# Patient Record
Sex: MALE | Race: BLACK OR AFRICAN AMERICAN | NOT HISPANIC OR LATINO | Employment: OTHER | URBAN - METROPOLITAN AREA
[De-identification: names, ages, dates, MRNs, and addresses within clinical notes are randomized per-mention and may not be internally consistent; named-entity substitution may affect disease eponyms.]

---

## 2017-04-17 PROBLEM — C90.01 MULTIPLE MYELOMA IN REMISSION: Status: ACTIVE | Noted: 2017-04-17

## 2017-07-06 ENCOUNTER — OFFICE VISIT (OUTPATIENT)
Dept: HEMATOLOGY/ONCOLOGY | Facility: CLINIC | Age: 67
End: 2017-07-06
Payer: MEDICARE

## 2017-07-06 ENCOUNTER — LAB VISIT (OUTPATIENT)
Dept: LAB | Facility: HOSPITAL | Age: 67
End: 2017-07-06
Attending: INTERNAL MEDICINE
Payer: MEDICARE

## 2017-07-06 VITALS
BODY MASS INDEX: 33.66 KG/M2 | TEMPERATURE: 99 F | DIASTOLIC BLOOD PRESSURE: 86 MMHG | HEIGHT: 73 IN | WEIGHT: 254 LBS | HEART RATE: 85 BPM | SYSTOLIC BLOOD PRESSURE: 152 MMHG

## 2017-07-06 DIAGNOSIS — R13.10 DYSPHAGIA, UNSPECIFIED TYPE: ICD-10-CM

## 2017-07-06 DIAGNOSIS — Z94.84 HISTORY OF PERIPHERAL STEM CELL TRANSPLANT: ICD-10-CM

## 2017-07-06 DIAGNOSIS — C90.01 MULTIPLE MYELOMA IN REMISSION: Primary | ICD-10-CM

## 2017-07-06 DIAGNOSIS — C90.01 MULTIPLE MYELOMA IN REMISSION: ICD-10-CM

## 2017-07-06 LAB
ALBUMIN SERPL BCP-MCNC: 3.5 G/DL
ALP SERPL-CCNC: 99 U/L
ALT SERPL W/O P-5'-P-CCNC: 30 U/L
ANION GAP SERPL CALC-SCNC: 9 MMOL/L
AST SERPL-CCNC: 21 U/L
BASOPHILS # BLD AUTO: 0.02 K/UL
BASOPHILS NFR BLD: 0.4 %
BILIRUB SERPL-MCNC: 0.3 MG/DL
BUN SERPL-MCNC: 12 MG/DL
CALCIUM SERPL-MCNC: 9 MG/DL
CHLORIDE SERPL-SCNC: 101 MMOL/L
CO2 SERPL-SCNC: 29 MMOL/L
CREAT SERPL-MCNC: 1.2 MG/DL
DIFFERENTIAL METHOD: ABNORMAL
EOSINOPHIL # BLD AUTO: 0.2 K/UL
EOSINOPHIL NFR BLD: 3.8 %
ERYTHROCYTE [DISTWIDTH] IN BLOOD BY AUTOMATED COUNT: 15.1 %
EST. GFR  (AFRICAN AMERICAN): >60 ML/MIN/1.73 M^2
EST. GFR  (NON AFRICAN AMERICAN): >60 ML/MIN/1.73 M^2
GLUCOSE SERPL-MCNC: 85 MG/DL
HCT VFR BLD AUTO: 37.5 %
HGB BLD-MCNC: 12.7 G/DL
LYMPHOCYTES # BLD AUTO: 2.4 K/UL
LYMPHOCYTES NFR BLD: 52.2 %
MCH RBC QN AUTO: 33.4 PG
MCHC RBC AUTO-ENTMCNC: 33.9 %
MCV RBC AUTO: 99 FL
MONOCYTES # BLD AUTO: 0.7 K/UL
MONOCYTES NFR BLD: 16.4 %
NEUTROPHILS # BLD AUTO: 1.2 K/UL
NEUTROPHILS NFR BLD: 27 %
PLATELET # BLD AUTO: 68 K/UL
PMV BLD AUTO: 10.9 FL
POTASSIUM SERPL-SCNC: 3.6 MMOL/L
PROT SERPL-MCNC: 7.4 G/DL
RBC # BLD AUTO: 3.8 M/UL
SODIUM SERPL-SCNC: 139 MMOL/L
WBC # BLD AUTO: 4.52 K/UL

## 2017-07-06 PROCEDURE — 1159F MED LIST DOCD IN RCRD: CPT | Mod: ,,, | Performed by: INTERNAL MEDICINE

## 2017-07-06 PROCEDURE — 84165 PROTEIN E-PHORESIS SERUM: CPT

## 2017-07-06 PROCEDURE — 84165 PROTEIN E-PHORESIS SERUM: CPT | Mod: 26,,, | Performed by: PATHOLOGY

## 2017-07-06 PROCEDURE — 1125F AMNT PAIN NOTED PAIN PRSNT: CPT | Mod: ,,, | Performed by: INTERNAL MEDICINE

## 2017-07-06 PROCEDURE — 85025 COMPLETE CBC W/AUTO DIFF WBC: CPT

## 2017-07-06 PROCEDURE — 99213 OFFICE O/P EST LOW 20 MIN: CPT | Mod: S$PBB,,, | Performed by: INTERNAL MEDICINE

## 2017-07-06 PROCEDURE — 99999 PR PBB SHADOW E&M-EST. PATIENT-LVL IV: CPT | Mod: PBBFAC,,, | Performed by: INTERNAL MEDICINE

## 2017-07-06 PROCEDURE — 80053 COMPREHEN METABOLIC PANEL: CPT

## 2017-07-06 PROCEDURE — 83520 IMMUNOASSAY QUANT NOS NONAB: CPT

## 2017-07-06 RX ORDER — BUDESONIDE AND FORMOTEROL FUMARATE DIHYDRATE 160; 4.5 UG/1; UG/1
2 AEROSOL RESPIRATORY (INHALATION) EVERY 12 HOURS
COMMUNITY

## 2017-07-06 NOTE — LETTER
July 7, 2017        Josse Araiza MD  [address]  [address]  [facility] [address]             [facility]  [address]  [facility] [address]  Phone: [phone]   Patient: Lane Nunez Jr.   MR Number: [id number]   YOB: 1950   Date of Visit: 7/6/2017       Dear Dr. Araiza:    Thank you for referring Lane Nunez to me for evaluation. Below are the relevant portions of my assessment and plan of care.        1. Multiple myeloma in remission    2. History of peripheral stem cell transplant    3. Dysphagia, unspecified type          Mr. 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 Pretransplant marrow again showed little disease as expected. His disease has never been evenly distributed in his marrow but only in discrete collections. PET-CT scan 10/9/14 to restage his disease showed no evidence of residual myeloma and he began on maintenance lenalidomide.  Repeat PET-CTs 8/28/15 and 6/2016 are still without evidence of new disease. His recent blood counts have been stable on lenalidomide followed by Dr. Araiza.  His platelet count is 68K with normal WBC 4.0 (ANC 1200) and hemoglobin 12.7  Hence, he seems to still be in CR pending SPEP and light chains.  Repeat PET-CT if these labs show signs of disease.     He has a known LVEF of 40% which is lower than normal. He has been evaluated in the cardioncology clinic and his medications modified. He has some edema with elevated BP today and he took PRN lasix this am.     His ongoing swallowing problems and possible aspiration. He has refused a G-tube on multiple occasions    He will continue monthly CBC while on lenalidomide with follow-up with Dr. Araiza.  Will call the patient tomorrow with his light chain results and plan for follow-up (if stable f/u in 6 months, if rising will obtain a PET scan)    Patient was seen with collaborating physician,   Luis Miguel Chan NP  Hematology/BMT      Distress Screening Results: Psychosocial Distress screening score of Distress Score: 0 noted and reviewed. No intervention indicated.     Attending: Patient seen and examined with NP and agree with assessment and plan above.  Light chain levels low with normal light chain ratio, excellent response.  He is doing well aside from chronic neurologic issues including swallowing issues and still needs thickener for drinks.  No other new issues.  RVBE      If you have questions, please do not hesitate to call me. I look forward to following Lane along with you.    Sincerely,      Luis Miguel Olmos MD           CC  Chuy Oconnell MD

## 2017-07-06 NOTE — PROGRESS NOTES
Subjective:       Patient ID: Lane Nunez Jr. is a 66 y.o. male.    Chief Complaint: Follow-up and Multiple Myeloma    HPI  KPS: 90%. Mr. 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  He denies any new neurologic issues and no new pain.  He has chronic back and neck pain, stable at 6/10 today. Neuropathy pain is stable. He continues to cough sometimes when swallowing food, no nausea or vomiting. He has previously refused a feeding tube. He is eating well with good appetite. Patient reports diarrhea and frequent stools several days a week. He reports he took lasix today because he feels swollen.       Myeloma History: The patient has been followed for many years with a long history of plasmacytomas. He was originally noted to have an isolated plasmacytoma of the thoracic spine in 2000. In August of 2010 he developed low back pain and was found to have diffuse marrow signal abnormalities with an L5 compression fracture on MRI. A previous metastatic bone survey in January 2010 did not show abnormalities. He has a bone marrow evaluation that did not show significant plasma cells. He underwent kyphoplasty at L5 and was followed. Another metastatic series in 9/2011 showed a lytic lesion in the left humerus, lucent changes in the calvarium and a questionable lytic lesion at the right inferiour pubic ramus. These were all stable from the examination of 8/2010. Another MRI scan 9/2011 again showed loss of normal marrow fatty signal and some degenerative changes but no other clear bony disease.     MRI done 8/2013 showed multiple lytic lesions throughout the spine, ribs and other bones with significant expansion of the C2 vertebral body with some soft tissue anterior and posterior with some cord effacement. Small nodes were appreciated in the left supraclavicular region, largest 2x1.8 cm.  Another MRI of the cervical/thoracic spine 9/2013 again showed multifocal osseous lesions throughout the cervical/thoracic spine with disc bulging at C3-4 andf C4-5. Metastatic series 9/2013 showed progressive lytic lesions of the cervical spine, new lytic lesions of the right humerus and bilateral ribs and lucent lesions of the scapulae bilaterally. Stable lucensies of the left humerus, right inferior pubic ramus and calvarium were seen again.     Free kappa light chain 9/3/13 elevated at 2577 with lambda of 11.83 with ratio of 217. IgG, A and M were in the normal range at 1136, 157 and 60 respectively. SPEP showed a normal pattern without M-spike. Bone marrow done 9/6/13 showed a 4% monoclonal plasma cell population in aspirate but no significant plasma cells on biopsy with normal cytogenetics. FISH for myeloma associated changes was negative. 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 He was to undergo PET scanning to follow his disease and begin therapy with Dr. Araiza. Unfortunately, he presented on 11/15 with cervical fracture associated with some spinal cord trauma and lower extremity weakness. He was admitted to [facility] and underwent cervical stabilization and continues in a cervical collar. He received cycles of bortezomib/dexamethasone prior to transplant.     PET-CT 4/2014 prior to transplant showed ongoing lytic lesions but no significant SUV except at the cervical regions which likely reflect remodeling/fracture this was the same 10/2014 (day 100). He has been on maintenance lenalidomide since that time. He is walking with a cane now and still exepriences some cervical pain when he moves his head in certain directions. He had not obtained local neurosurgical follow up and we will need to arrange this here. His swallowing is less of an issue and he uses thickener with liquids; still with choking about twice a  day. He continues to suffer with some neuropathy at his legs and feet as well as some at his upper upper extremities but this has improved over the past 3 months. Neuropathy pain still requires PRN narcotics.     His one year restaging marrow done 7/24/15 showed 40% cellularity with trilineage hematopoiesis and no morphologic evidence of plasma cells.  Cytogenetics could not be done as the metaphase cells did not grow. He had a restaging PET CT scan in 6/2016 which did not reveal any evidence of myeloma.    Review of Systems   Constitutional: Negative for activity change, appetite change, chills, diaphoresis, fatigue, fever and unexpected weight change.   HENT: Positive for trouble swallowing. Negative for congestion, dental problem, mouth sores, nosebleeds, postnasal drip, rhinorrhea, sinus pressure and sore throat.    Eyes: Negative for photophobia and visual disturbance.   Respiratory: Positive for cough. Negative for shortness of breath.         When eating   Cardiovascular: Positive for leg swelling. Negative for chest pain and palpitations.        +1-2 edema   Gastrointestinal: Negative for abdominal distention, abdominal pain, blood in stool, constipation, diarrhea, nausea and vomiting.   Genitourinary: Negative for dysuria, frequency, hematuria and urgency.   Musculoskeletal: Positive for back pain, neck pain and neck stiffness. Negative for arthralgias and myalgias.   Skin: Negative for pallor and rash.   Allergic/Immunologic: Negative for immunocompromised state.   Neurological: Positive for numbness. Negative for dizziness, syncope, weakness and headaches.   Hematological: Negative for adenopathy. Does not bruise/bleed easily.   Psychiatric/Behavioral: Negative for confusion. The patient is not nervous/anxious.        Objective:      Physical Exam   Constitutional: He is oriented to person, place, and time. He appears well-developed and well-nourished. No distress.   HENT:   Head: Normocephalic and  atraumatic.   Mouth/Throat: Oropharynx is clear and moist. No oropharyngeal exudate.   Eyes: Conjunctivae are normal. Pupils are equal, round, and reactive to light.   Neck: Neck supple.   Cardiovascular: Normal rate and regular rhythm.    No murmur heard.  Pulmonary/Chest: Effort normal and breath sounds normal. He has no rales.   Abdominal: Soft. Bowel sounds are normal. He exhibits no mass. There is no splenomegaly. There is no tenderness.   Musculoskeletal: Normal range of motion. He exhibits no edema.   No spinal tenderness  No rib tenderness  Not wearing cervical collar and with limited ROM   Lymphadenopathy:     He has no cervical adenopathy.     He has no axillary adenopathy.        Right: No inguinal adenopathy present.        Left: Supraclavicular adenopathy present. No inguinal adenopathy present.   Abnormal tissue at left supraclavicular region (?nodes vs scar)   Neurological: He is alert and oriented to person, place, and time.   Skin: Skin is warm and dry. No rash noted. He is not diaphoretic.   Psychiatric: He has a normal mood and affect. Thought content normal.       Assessment:       1. Multiple myeloma in remission    2. History of peripheral stem cell transplant    3. Dysphagia, unspecified type        Plan:       Mr. 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 Pretransplant marrow again showed little disease as expected. His disease has never been evenly distributed in his marrow but only in discrete collections. PET-CT scan 10/9/14 to restage his disease showed no evidence of residual myeloma and he began on maintenance lenalidomide.  Repeat PET-CTs 8/28/15 and 6/2016 are still without evidence of new disease. His recent blood counts have been stable on lenalidomide followed by Dr. Araiza.  His platelet count is 68K with normal WBC 4.0 (ANC 1200) and hemoglobin 12.7  Hence, he seems to still be in CR [facility]  and [facility].  Repeat PET-CT if these labs show signs of disease.     He has a known LVEF of 40% which is lower than normal. He has been evaluated in the cardioncology clinic and his medications modified. He has some edema with elevated BP today and he took PRN lasix this am.     His ongoing swallowing problems and possible aspiration. He has refused a G-tube on multiple occasions    He will continue monthly CBC while on lenalidomide with follow-up with Dr. Araiza.  Will call the patient tomorrow with his light chain results and plan for follow-up (if stable f/u in 6 months, if rising will obtain a PET scan)    Patient was seen with collaborating physician, Dr. Luis Miguel Chan NP  Hematology/BMT            Distress Screening Results: Psychosocial Distress screening score of Distress Score: 0 noted and reviewed. No intervention indicated.    Attending: Patient seen and examined with NP and agree with assessment and plan above.  Light chain levels low with normal light chain ratio, excellent response.  He is doing well aside from chronic neurologic issues including swallowing issues and still needs thickener for drinks.  No other new issues.  RVBE

## 2017-07-07 LAB
ALBUMIN SERPL ELPH-MCNC: 3.77 G/DL
ALPHA1 GLOB SERPL ELPH-MCNC: 0.42 G/DL
ALPHA2 GLOB SERPL ELPH-MCNC: 0.85 G/DL
B-GLOBULIN SERPL ELPH-MCNC: 1.04 G/DL
GAMMA GLOB SERPL ELPH-MCNC: 1.22 G/DL
KAPPA LC SER QL IA: 3.46 MG/DL
KAPPA LC/LAMBDA SER IA: 1.51
LAMBDA LC SER QL IA: 2.29 MG/DL
PATHOLOGIST INTERPRETATION SPE: NORMAL
PROT SERPL-MCNC: 7.3 G/DL

## 2017-08-23 PROBLEM — R93.1 LEFT VENTRICULAR EJECTION FRACTION LESS THAN 40%: Status: ACTIVE | Noted: 2017-08-23

## 2017-12-27 ENCOUNTER — TELEPHONE (OUTPATIENT)
Dept: HEMATOLOGY/ONCOLOGY | Facility: CLINIC | Age: 67
End: 2017-12-27

## 2017-12-27 NOTE — TELEPHONE ENCOUNTER
Daughter phoned today to make an appt for her father.  Patient followed with Dr. Olmos.  Had an auto transplant in 2014 for MM.  Daughter states they have been waiting for an appt to be called to them for 2 weeks.  Patient was told he will be seeing Dr. Idris Bernabe.  Appt made for the patient tomorrow at 9am.  Dr. Bernabe aware.  Will obtain labs after the physician appt.  Instructed daughter to check in on the 1st floor and then come up to the BMT clinic.    ALAN Escalera RN, BMTCN

## 2017-12-28 ENCOUNTER — HOSPITAL ENCOUNTER (INPATIENT)
Facility: HOSPITAL | Age: 67
LOS: 7 days | Discharge: HOME HEALTH | DRG: 841 | End: 2018-01-04
Attending: INTERNAL MEDICINE | Admitting: INTERNAL MEDICINE
Payer: MEDICARE

## 2017-12-28 ENCOUNTER — OFFICE VISIT (OUTPATIENT)
Dept: HEMATOLOGY/ONCOLOGY | Facility: CLINIC | Age: 67
DRG: 841 | End: 2017-12-28
Payer: MEDICARE

## 2017-12-28 VITALS
DIASTOLIC BLOOD PRESSURE: 68 MMHG | HEART RATE: 112 BPM | OXYGEN SATURATION: 98 % | SYSTOLIC BLOOD PRESSURE: 108 MMHG | RESPIRATION RATE: 18 BRPM | BODY MASS INDEX: 29.27 KG/M2 | WEIGHT: 220.88 LBS | TEMPERATURE: 99 F | HEIGHT: 73 IN

## 2017-12-28 DIAGNOSIS — C90.01 MULTIPLE MYELOMA IN REMISSION: ICD-10-CM

## 2017-12-28 DIAGNOSIS — M54.50 BILATERAL LOW BACK PAIN WITHOUT SCIATICA, UNSPECIFIED CHRONICITY: ICD-10-CM

## 2017-12-28 DIAGNOSIS — D47.9 LYMPHOPROLIFERATIVE DISORDER: Primary | ICD-10-CM

## 2017-12-28 DIAGNOSIS — R29.898 WEAKNESS OF BOTH LOWER EXTREMITIES: ICD-10-CM

## 2017-12-28 DIAGNOSIS — Z94.84 HISTORY OF PERIPHERAL STEM CELL TRANSPLANT: ICD-10-CM

## 2017-12-28 DIAGNOSIS — D63.8 ANEMIA OF CHRONIC DISEASE: ICD-10-CM

## 2017-12-28 DIAGNOSIS — R00.0 TACHYCARDIA: ICD-10-CM

## 2017-12-28 DIAGNOSIS — M62.838 MUSCLE SPASM: ICD-10-CM

## 2017-12-28 DIAGNOSIS — R53.81 DEBILITY: ICD-10-CM

## 2017-12-28 DIAGNOSIS — C85.90 T-CELL LYMPHOMA: ICD-10-CM

## 2017-12-28 DIAGNOSIS — D69.6 THROMBOCYTOPENIA: ICD-10-CM

## 2017-12-28 DIAGNOSIS — D48.5 NEOPLASM OF UNCERTAIN BEHAVIOR OF SKIN: ICD-10-CM

## 2017-12-28 PROBLEM — H40.9 GLAUCOMA: Status: ACTIVE | Noted: 2017-12-28

## 2017-12-28 PROBLEM — R13.10 DYSPHAGIA: Status: ACTIVE | Noted: 2017-12-28

## 2017-12-28 PROBLEM — G62.9 NEUROPATHY: Status: ACTIVE | Noted: 2017-12-28

## 2017-12-28 LAB — BONE MARROW WRIGHT STAIN COMMENT: NORMAL

## 2017-12-28 PROCEDURE — 88237 TISSUE CULTURE BONE MARROW: CPT

## 2017-12-28 PROCEDURE — 88364 INSITU HYBRIDIZATION (FISH): CPT | Mod: 26,,, | Performed by: PATHOLOGY

## 2017-12-28 PROCEDURE — 88365 INSITU HYBRIDIZATION (FISH): CPT | Mod: 26,,, | Performed by: PATHOLOGY

## 2017-12-28 PROCEDURE — 88305 TISSUE EXAM BY PATHOLOGIST: CPT | Mod: 26,,, | Performed by: PATHOLOGY

## 2017-12-28 PROCEDURE — 99215 OFFICE O/P EST HI 40 MIN: CPT | Mod: S$PBB,,, | Performed by: INTERNAL MEDICINE

## 2017-12-28 PROCEDURE — 88305 TISSUE EXAM BY PATHOLOGIST: CPT | Performed by: PATHOLOGY

## 2017-12-28 PROCEDURE — 88342 IMHCHEM/IMCYTCHM 1ST ANTB: CPT | Mod: 26,,, | Performed by: PATHOLOGY

## 2017-12-28 PROCEDURE — 85097 BONE MARROW INTERPRETATION: CPT | Mod: ,,, | Performed by: PATHOLOGY

## 2017-12-28 PROCEDURE — 38221 DX BONE MARROW BIOPSIES: CPT

## 2017-12-28 PROCEDURE — 63600175 PHARM REV CODE 636 W HCPCS: Performed by: INTERNAL MEDICINE

## 2017-12-28 PROCEDURE — 88341 IMHCHEM/IMCYTCHM EA ADD ANTB: CPT | Mod: 26,,, | Performed by: PATHOLOGY

## 2017-12-28 PROCEDURE — 88189 FLOWCYTOMETRY/READ 16 & >: CPT | Mod: ,,, | Performed by: PATHOLOGY

## 2017-12-28 PROCEDURE — 88313 SPECIAL STAINS GROUP 2: CPT

## 2017-12-28 PROCEDURE — 88341 IMHCHEM/IMCYTCHM EA ADD ANTB: CPT | Mod: 26,59,, | Performed by: PATHOLOGY

## 2017-12-28 PROCEDURE — 88185 FLOWCYTOMETRY/TC ADD-ON: CPT | Mod: 59 | Performed by: PATHOLOGY

## 2017-12-28 PROCEDURE — 88342 IMHCHEM/IMCYTCHM 1ST ANTB: CPT | Mod: 26,59,, | Performed by: PATHOLOGY

## 2017-12-28 PROCEDURE — G0364 BONE MARROW ASPIRATE &BIOPSY: HCPCS | Mod: S$PBB,LT,, | Performed by: NURSE PRACTITIONER

## 2017-12-28 PROCEDURE — 88264 CHROMOSOME ANALYSIS 20-25: CPT

## 2017-12-28 PROCEDURE — 88299 UNLISTED CYTOGENETIC STUDY: CPT

## 2017-12-28 PROCEDURE — 20600001 HC STEP DOWN PRIVATE ROOM

## 2017-12-28 PROCEDURE — 25000003 PHARM REV CODE 250: Performed by: NURSE PRACTITIONER

## 2017-12-28 PROCEDURE — 38221 DX BONE MARROW BIOPSIES: CPT | Mod: S$PBB,LT,, | Performed by: NURSE PRACTITIONER

## 2017-12-28 PROCEDURE — 88311 DECALCIFY TISSUE: CPT | Mod: 26,,, | Performed by: PATHOLOGY

## 2017-12-28 PROCEDURE — 99214 OFFICE O/P EST MOD 30 MIN: CPT | Mod: PBBFAC,25 | Performed by: INTERNAL MEDICINE

## 2017-12-28 PROCEDURE — 88184 FLOWCYTOMETRY/ TC 1 MARKER: CPT | Performed by: PATHOLOGY

## 2017-12-28 PROCEDURE — 85097 BONE MARROW INTERPRETATION: CPT | Performed by: PATHOLOGY

## 2017-12-28 PROCEDURE — 07DR3ZX EXTRACTION OF ILIAC BONE MARROW, PERCUTANEOUS APPROACH, DIAGNOSTIC: ICD-10-PCS | Performed by: INTERNAL MEDICINE

## 2017-12-28 PROCEDURE — 99999 PR PBB SHADOW E&M-EST. PATIENT-LVL IV: CPT | Mod: PBBFAC,,, | Performed by: INTERNAL MEDICINE

## 2017-12-28 PROCEDURE — 88313 SPECIAL STAINS GROUP 2: CPT | Mod: 26,,, | Performed by: PATHOLOGY

## 2017-12-28 PROCEDURE — 81342 TRG GENE REARRANGEMENT ANAL: CPT

## 2017-12-28 PROCEDURE — 99223 1ST HOSP IP/OBS HIGH 75: CPT | Mod: GC,,, | Performed by: INTERNAL MEDICINE

## 2017-12-28 RX ORDER — FUROSEMIDE 20 MG/1
20 TABLET ORAL DAILY
Status: DISCONTINUED | OUTPATIENT
Start: 2017-12-29 | End: 2018-01-04 | Stop reason: HOSPADM

## 2017-12-28 RX ORDER — MEMANTINE HYDROCHLORIDE 5 MG/1
5 TABLET ORAL 2 TIMES DAILY
Status: DISCONTINUED | OUTPATIENT
Start: 2017-12-28 | End: 2018-01-04 | Stop reason: HOSPADM

## 2017-12-28 RX ORDER — FINASTERIDE 5 MG/1
5 TABLET, FILM COATED ORAL
Status: DISCONTINUED | OUTPATIENT
Start: 2017-12-29 | End: 2018-01-04 | Stop reason: HOSPADM

## 2017-12-28 RX ORDER — SODIUM CHLORIDE 0.9 % (FLUSH) 0.9 %
5 SYRINGE (ML) INJECTION
Status: DISCONTINUED | OUTPATIENT
Start: 2017-12-28 | End: 2018-01-04 | Stop reason: HOSPADM

## 2017-12-28 RX ORDER — HYDROCODONE BITARTRATE AND ACETAMINOPHEN 7.5; 325 MG/1; MG/1
2 TABLET ORAL EVERY 6 HOURS PRN
Status: DISCONTINUED | OUTPATIENT
Start: 2017-12-28 | End: 2018-01-04 | Stop reason: HOSPADM

## 2017-12-28 RX ORDER — LISINOPRIL 10 MG/1
10 TABLET ORAL DAILY
Status: DISCONTINUED | OUTPATIENT
Start: 2017-12-29 | End: 2018-01-04 | Stop reason: HOSPADM

## 2017-12-28 RX ORDER — PREDNISONE 20 MG/1
100 TABLET ORAL DAILY
Status: DISCONTINUED | OUTPATIENT
Start: 2017-12-28 | End: 2018-01-02

## 2017-12-28 RX ORDER — FLUTICASONE FUROATE AND VILANTEROL 100; 25 UG/1; UG/1
1 POWDER RESPIRATORY (INHALATION) DAILY
Status: DISCONTINUED | OUTPATIENT
Start: 2017-12-29 | End: 2018-01-04 | Stop reason: HOSPADM

## 2017-12-28 RX ORDER — PREDNISONE 20 MG/1
100 TABLET ORAL DAILY
Status: DISCONTINUED | OUTPATIENT
Start: 2017-12-29 | End: 2017-12-28

## 2017-12-28 RX ORDER — CARVEDILOL 6.25 MG/1
6.25 TABLET ORAL 2 TIMES DAILY
Status: DISCONTINUED | OUTPATIENT
Start: 2017-12-28 | End: 2018-01-04 | Stop reason: HOSPADM

## 2017-12-28 RX ORDER — FAMOTIDINE 20 MG/1
20 TABLET, FILM COATED ORAL 2 TIMES DAILY
Status: DISCONTINUED | OUTPATIENT
Start: 2017-12-28 | End: 2018-01-04 | Stop reason: HOSPADM

## 2017-12-28 RX ORDER — LIDOCAINE HYDROCHLORIDE 20 MG/ML
10 INJECTION, SOLUTION INFILTRATION; PERINEURAL ONCE
Status: COMPLETED | OUTPATIENT
Start: 2017-12-28 | End: 2017-12-28

## 2017-12-28 RX ORDER — AMITRIPTYLINE HYDROCHLORIDE 50 MG/1
50 TABLET, FILM COATED ORAL NIGHTLY
Status: DISCONTINUED | OUTPATIENT
Start: 2017-12-28 | End: 2018-01-04 | Stop reason: HOSPADM

## 2017-12-28 RX ORDER — DIAZEPAM 5 MG/1
5 TABLET ORAL EVERY 6 HOURS PRN
Status: DISCONTINUED | OUTPATIENT
Start: 2017-12-28 | End: 2018-01-04 | Stop reason: HOSPADM

## 2017-12-28 RX ORDER — TRAVOPROST OPHTHALMIC SOLUTION 0.04 MG/ML
1 SOLUTION OPHTHALMIC NIGHTLY
Status: DISCONTINUED | OUTPATIENT
Start: 2017-12-28 | End: 2018-01-04 | Stop reason: HOSPADM

## 2017-12-28 RX ORDER — GALANTAMINE 4 MG/1
16 TABLET, FILM COATED ORAL 2 TIMES DAILY WITH MEALS
Status: DISCONTINUED | OUTPATIENT
Start: 2017-12-28 | End: 2018-01-04 | Stop reason: HOSPADM

## 2017-12-28 RX ORDER — GABAPENTIN 300 MG/1
300 CAPSULE ORAL 3 TIMES DAILY
Status: DISCONTINUED | OUTPATIENT
Start: 2017-12-28 | End: 2018-01-04 | Stop reason: HOSPADM

## 2017-12-28 RX ADMIN — GABAPENTIN 300 MG: 300 CAPSULE ORAL at 09:12

## 2017-12-28 RX ADMIN — PREDNISONE 100 MG: 20 TABLET ORAL at 09:12

## 2017-12-28 RX ADMIN — FAMOTIDINE 20 MG: 20 TABLET, FILM COATED ORAL at 09:12

## 2017-12-28 RX ADMIN — TRAVOPROST 1 DROP: 0.04 SOLUTION/ DROPS OPHTHALMIC at 09:12

## 2017-12-28 RX ADMIN — OLANZAPINE 7.5 MG: 5 TABLET, FILM COATED ORAL at 09:12

## 2017-12-28 RX ADMIN — CARVEDILOL 6.25 MG: 6.25 TABLET, FILM COATED ORAL at 09:12

## 2017-12-28 RX ADMIN — AMITRIPTYLINE HYDROCHLORIDE 50 MG: 50 TABLET, FILM COATED ORAL at 09:12

## 2017-12-28 RX ADMIN — MEMANTINE HYDROCHLORIDE 5 MG: 5 TABLET ORAL at 10:12

## 2017-12-28 NOTE — SUBJECTIVE & OBJECTIVE
Patient information was obtained from patient, spouse/SO and past medical records.     Oncology History: Per last clinic note by Dr. Patricia:  Myeloma History: The patient has been followed for many years with a long history of plasmacytomas. He was originally noted to have an isolated plasmacytoma of the thoracic spine in 2000. In August of 2010 he developed low back pain and was found to have diffuse marrow signal abnormalities with an L5 compression fracture on MRI. A previous metastatic bone survey in January 2010 did not show abnormalities. He has a bone marrow evaluation that did not show significant plasma cells. He underwent kyphoplasty at L5 and was followed. Another metastatic series in 9/2011 showed a lytic lesion in the left humerus, lucent changes in the calvarium and a questionable lytic lesion at the right inferiour pubic ramus. These were all stable from the examination of 8/2010. Another MRI scan 9/2011 again showed loss of normal marrow fatty signal and some degenerative changes but no other clear bony disease.      8/2013 MR showed multiple lytic lesions throughout the spine, ribs and other bones with significant expansion of the C2 vertebral body with some soft tissue anterior and posterior with some cord effacement. Small nodes were appreciated in the left supraclavicular region, largest 2x1.8 cm. Another MRI of the cervical/thoracic spine 9/2013 again showed multifocal osseous lesions throughout the cervical/thoracic spine with disc bulging at C3-4 andf C4-5. Metastatic series 9/2013 showed progressive lytic lesions of the cervical spine, new lytic lesions of the right humerus and bilateral ribs and lucent lesions of the scapulae bilaterally. Stable lucensies of the left humerus, right inferior pubic ramus and calvarium were seen again.      Free kappa light chain 9/3/13 elevated at 2577 with lambda of 11.83 with ratio of 217. IgG, A and M were in the normal range at 1136, 157 and 60  respectively. SPEP showed a normal pattern without M-spike. Bone marrow done 9/6/13 showed a 4% monoclonal plasma cell population in aspirate but no significant plasma cells on biopsy with normal cytogenetics. FISH for myeloma associated changes was negative. 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 He was to undergo PET scanning to follow his disease and begin therapy with Dr. Araiza. Unfortunately, he presented on 11/15 with cervical fracture associated with some spinal cord trauma and lower extremity weakness. He was admitted to [facility] and underwent cervical stabilization and continues in a cervical collar. He received cycles of bortezomib/dexamethasone prior to transplant.      04/14 PET/CT prior to transplant showed ongoing lytic lesions but no significant SUV except at the cervical regions which likely reflect remodeling/fracture this was the same 10/2014 (day 100). He has been on maintenance lenalidomide since that time. He is walking with a cane now and still exepriences some cervical pain when he moves his head in certain directions. He had not obtained local neurosurgical follow up and we will need to arrange this here. His swallowing is less of an issue and he uses thickener with liquids; still with choking about twice a day. He continues to suffer with some neuropathy at his legs and feet as well as some at his upper upper extremities but this has improved over the past 3 months. Neuropathy pain still requires PRN narcotics.   05/14 metastatic series: Marrow lucencies noted involving the cervical spine, lumbar spine, and pelvis, which may represent multiple myeloma deposits.        07/24/15 one year restaging marrow: 40% cellularity with trilineage hematopoiesis and no morphologic evidence of plasma cells.  Cytogenetics could not be done as the metaphase cells did not grow.   06/16 PET CT did not reveal any  evidence of myeloma.    Prescriptions Prior to Admission   Medication Sig Dispense Refill Last Dose    amitriptyline (ELAVIL) 50 MG tablet Take 50 mg by mouth every evening.   12/27/2017 at Unknown time    aspirin (ECOTRIN) 81 MG EC tablet Take 81 mg by mouth every morning.    12/28/2017 at Unknown time    carvedilol (COREG) 3.125 MG tablet Take 6.25 mg by mouth 2 (two) times daily.   12/28/2017 at Unknown time    cholecalciferol, vitamin D3, (VITAMIN D3) 2,000 unit Cap Take 1 capsule by mouth after lunch.    12/28/2017 at Unknown time    dexamethasone (DECADRON) 4 MG Tab Take 1 tablet (4 mg total) by mouth once a week. 30 tablet 6 Past Week at Unknown time    diazePAM (VALIUM) 5 MG tablet Take 1 tablet (5 mg total) by mouth every 6 (six) hours as needed for Anxiety. 60 tablet 0 Past Week at Unknown time    finasteride (PROSCAR) 5 mg tablet Take 5 mg by mouth after lunch.    12/28/2017 at Unknown time    furosemide (LASIX) 20 MG tablet Take 1 tablet (20 mg total) by mouth once daily. 30 tablet 3 Past Month at Unknown time    gabapentin (NEURONTIN) 300 MG capsule Take 300 mg by mouth 3 (three) times daily.   12/28/2017 at Unknown time    galantamine (RAZADYNE) 8 MG Tab Take 16 mg by mouth 2 (two) times daily with meals.    12/28/2017 at Unknown time    hydrocodone-acetaminophen 7.5-325mg (NORCO) 7.5-325 mg per tablet Take  2 tabs every 6 hours as needed for pain 60 tablet 0 12/28/2017 at Unknown time    memantine (NAMENDA) 5 MG Tab Take 5 mg by mouth 2 (two) times daily.   12/28/2017 at Unknown time    MULTIVIT &MINERALS/FERROUS FUM (MULTI VITAMIN ORAL) Take by mouth.   12/28/2017 at Unknown time    naproxen (EC NAPROSYN) 500 MG EC tablet   2 Past Week at Unknown time    olanzapine (ZYPREXA) 15 MG Tab Take 7.5 mg by mouth nightly.   12/27/2017 at Unknown time    ranitidine (ZANTAC) 150 MG capsule Take 150 mg by mouth 2 (two) times daily.   12/28/2017 at Unknown time    TRAVATAN Z 0.004 % Drop Place 1  drop into both eyes nightly.  4 12/27/2017 at Unknown time    venlafaxine 150 mg TR24 Take 150 mg by mouth once daily.   12/28/2017 at Unknown time    vitamin E 1000 UNIT capsule Take 1,000 Units by mouth after lunch.    12/28/2017 at Unknown time    budesonide-formoterol 160-4.5 mcg (SYMBICORT) 160-4.5 mcg/actuation HFAA Inhale 2 puffs into the lungs every 12 (twelve) hours. Controller   Taking    lisinopril (PRINIVIL,ZESTRIL) 5 MG tablet Take 10 mg by mouth once daily.    Taking    potassium chloride (KLOR-CON) 10 MEQ TbSR Take 1 tablet (10 mEq total) by mouth once daily. 30 tablet 3 Unknown at Unknown time    potassium chloride SA (K-DUR,KLOR-CON) 10 MEQ tablet Take 10 mEq by mouth once daily.  3 Unknown at Unknown time    REVLIMID 10 mg Cap Take 1 capsule (10mg) by mouth daily for 21 days, then rest for 7 days. 21 each 0 Not Taking       Keflex [cephalexin]     Past Medical History:   Diagnosis Date    Cancer     CHF (congestive heart failure)     Depression     GERD (gastroesophageal reflux disease)     High cholesterol     Hypertension     Left ventricular ejection fraction less than 40% 8/23/2017    Multiple myeloma     Renal disorder     Stroke     no sequelae     Past Surgical History:   Procedure Laterality Date    BACK SURGERY  1/2000    metal julieth at T-4    CHOLECYSTECTOMY      HEMORRHOID SURGERY      KYPHOSIS SURGERY  2010    PROSTATE SURGERY      SPINE SURGERY      TONSILLECTOMY       Family History     Problem Relation (Age of Onset)    Heart disease Mother    Hypertension Mother    Kidney disease Mother    Scoliosis Daughter    Stroke Father        Social History Main Topics    Smoking status: Former Smoker     Packs/day: 1.00     Years: 40.00     Quit date: 11/15/2008    Smokeless tobacco: Never Used    Alcohol use No      Comment: rare    Drug use: No    Sexual activity: Yes     Partners: Female       Review of Systems   Unable to perform ROS: Dementia   Information  given by spouse to best of her knowledge   Used chart review    Objective:     Vital Signs (Most Recent):  Temp: 99.2 °F (37.3 °C) (12/28/17 1538)  Pulse: (!) 123 (12/28/17 1538)  Resp: 18 (12/28/17 1538)  BP: 108/68 (12/28/17 1538)  SpO2: 98 % (12/28/17 1538) Vital Signs (24h Range):  Temp:  [99.2 °F (37.3 °C)-99.4 °F (37.4 °C)] 99.2 °F (37.3 °C)  Pulse:  [112-123] 123  Resp:  [18] 18  SpO2:  [98 %] 98 %  BP: (108-128)/(68-72) 108/68        There is no height or weight on file to calculate BMI.  There is no height or weight on file to calculate BSA.    ECOG SCORE         [unfilled]    Lines/Drains/Airways     Central Venous Catheter Line                 Port A Cath Single Lumen left subclavian -- days                Physical Exam   Constitutional: He is oriented to person, place, and time. He appears well-nourished. He appears lethargic. He is cooperative.   HENT:   Head: Normocephalic and atraumatic.   Eyes: Lids are normal.   Neck: Trachea normal and normal range of motion. Carotid bruit is not present.   Cardiovascular: Regular rhythm and normal heart sounds.  Tachycardia present.    -120s  No distress noted   Pulmonary/Chest: Effort normal and breath sounds normal.   Abdominal: Normal appearance and bowel sounds are normal.   Musculoskeletal: Normal range of motion.   BLE weakness, unable to ambulate without total assit   Lymphadenopathy:   None palpable    Neurological: He is oriented to person, place, and time. He appears lethargic. Abnormal gait: Unable to ambulate without total assist.   Skin: Skin is dry. He is not diaphoretic. No pallor.        Numerous vesicular type lesions, derm to perform skin bx. See graphical documentation for some areas where lesions were found. Derm following   Psychiatric: He has a normal mood and affect. His speech is normal and behavior is normal. Judgment and thought content normal. Delayed: slow to respond. Cognition and memory are impaired. Depressed: somewhat flat  affect.       Significant Labs:   Recent Lab Results       12/28/17  1531 12/28/17  1136      Immature Granulocytes  0.6(H)     Immature Grans (Abs)  0.03     Bone Marrow Griffith Stain Comment See Anatomic Pathology bone marrow report.      Albumin  2.4(L)     Alkaline Phosphatase  158(H)     ALT  16     Anion Gap  10     Aniso  Slight     AST  25     Baso #  0.01     Basophil%  0.2     Beta-2 Microglobulin  3.9(H)     Total Bilirubin  0.4  Comment:  For infants and newborns, interpretation of results should be based  on gestational age, weight and in agreement with clinical  observations.  Premature Infant recommended reference ranges:  Up to 24 hours.............<8.0 mg/dL  Up to 48 hours............<12.0 mg/dL  3-5 days..................<15.0 mg/dL  6-29 days.................<15.0 mg/dL       Body Site - Bone Marrow LPI      BUN, Bld  19     Calcium  9.3     Chloride  102     Clinical Diagnosis - Bone Marrow LYMPH      CO2  25     Creatinine  1.1     Differential Method  Automated     eGFR if African American  >60.0     eGFR if non   >60.0  Comment:  Calculation used to obtain the estimated glomerular filtration  rate (eGFR) is the CKD-EPI equation.        Eos #  0.0     Eosinophil%  0.0     Glucose  113(H)     Gran #  2.9     Gran%  54.8     Hematocrit  27.3(L)     Hemoglobin  8.8(L)     IgA  237  Comment:  IgA Cord Blood Reference Range: <5 mg/dL.     IgG - Serum  1227  Comment:  IgG Cord Blood Reference Range: 650-1600 mg/dL.     IgM  37  Comment:  IgM Cord Blood Reference Range: <25 mg/dL.(L)     LD  890  Comment:  Results are increased in hemolyzed samples.(H)     Lymph #  1.5     Lymph%  29.2     MCH  31.9(H)     MCHC  32.2     MCV  99(H)     Mono #  0.8     Mono%  15.2(H)     MPV  10.1     nRBC  0     Platelet Estimate  Decreased(A)     Platelets  29  Comment:  PLATELET COUNT critical result(s) called and verbal readback obtained   from LINDA JACOME RN, 12/28/2017 12:29  (LL)     Potassium   3.9     Total Protein  7.9     Protein, Serum  7.0     RBC  2.76(L)     RDW  16.2(H)     Reason for Referral lymphoma      Sodium  137     WBC  5.20        and All pertinent labs from the last 24 hours have been reviewed.    Diagnostic Results:  I have reviewed all pertinent imaging results/findings within the past 24 hours.   Skin bx 11/2017: CD30+ lymphoproliferative disorder (outside facility, unsure where, no records)  Skeletal survey ordered 12/28/17, to be performed tonight  Skin bx 12/28/17, performed and pending results  BM bx 12/28/17, completed and pending results

## 2017-12-28 NOTE — PROGRESS NOTES
12/28/17 1417   Vital Signs   Temp 99.3 °F (37.4 °C)   Pulse (!) 119   Resp 18   SpO2 98 %   O2 Device (Oxygen Therapy) room air   /72   MAP (mmHg) 94   Pt admitted to 1003.  BMT notified

## 2017-12-28 NOTE — ASSESSMENT & PLAN NOTE
Prior Skin biopsy with Cd30+ lymphoproliferative disorder in Nov 2018.  DDX still includes LYP Type C vs systemic or primary cutaneous Anaplastic Large Cell lymphoma. Patient currently undergoing restaging with bone marrow biopsy and imaging   -Skin biopsy today to aid in evaluation/restaging  -Procedure note below   -Keep areas covered for 24 hrs then may keep area  moist with Aquaphor and covered  -Heme-Onc and following, appreciate recs    Shave biopsy performed after verbal consent including risk of infection, scar, recurrence, need for additional treatment of site. Area prepped with alcohol, anesthetized with approximately 1.0 cc of 1% lidocaine with epinephrine. Lesional tissue shaved with razor blade. Hemostasis achieved with application of aluminum chloride. No complications. Dressing applied. Wound care explained.

## 2017-12-28 NOTE — NURSING
Bone marrow biopsy performed at the bedside.  Prior to procedure, a time out was called.  NP used sterile technique throughout.  4 tubes of aspirate, multiple slides, and 3 core samples were obtained and sent.  Patient tolerated procedure well.  Currently lying on his back, dressing is CDI.  Monitor

## 2017-12-28 NOTE — PROCEDURES
PROCEDURE NOTE:  Date of Procedure: 12/28/2017  Bone Marrow Biopsy and Aspiration  Indication: hx of multiple myeloma, s/p auto sct 3 years ago, concern for lymphoma (cutaneous?)  Consent: Informed consent was obtained from patient.  Timeout: Done and documented.  Position: Right lateral  Site: Left posterior illiac crest.  Prep: Betadine.  Needle used: 11 gauge Jamshidi needle.  Anesthetic: 1% lidocaine 5 cc used as 2% not available. Pt tolerated this well.  Biopsy: The biopsy needle was introduced into the marrow cavity and an aspirate was obtained without complications and sent for flow cytometry and cytogenetics. Three core biopsies (first two were small) obtained without difficulty and sent for routine histologic examination.  Complications: None.  Disposition: The patient was left in room with his RN, remains on inpatient service. Instructed to lay on back for 20 minutes and remove bandaid in 24 hours.  Blood loss: Minimal.     Ivonne Curtis DNP, NP  Hematology/Oncology

## 2017-12-28 NOTE — HOSPITAL COURSE
12/28/2017: Pt admitted from bmt clinic after seeing Dr. Idris Bernabe for expedited work up. Follows with Dr. Patricia in [place], [place]. Hx of multiple myeloma with auto SCT 3 years ago. Now with possible concerns for lymphoma (cutaneous?). Denies SOB, chest pain, swelling. With weight loss and fatigue, as well as fevers/chills. With numerous vesicular lesions on trunk, arms, head, above groin (started about 1.5 months ago). Previously had skin bx 11/2017. Reported to show CD30+ lymphoproliferative disorder. Will need bm bx, repeat skin bx, skeletal survey, and to start prednisone 1 mg/kg/day after biopsies are performed. States he has been off revlimid for 3-4 weeks due to thrombocytopenia.  12/29/2017: Skeletal survey without evidence of metastatic disease, skin bx and BM bx both pending. Started on steroids pred 100 mg/day. No issues overnight  1/2/18: NEON, VSS  1/3/18: NEON, VSS, awaiting bone marrow and skin biopsy results  01/04/2018: Still awaiting skin bx and BM bx results (flow negative), path lab states there is a delay due to staining instrumental issues. Awake and alert, but confused at times. Speech saw pt yesterday and recs are regular diet with nectar thick liquids with aspiration precautions. Will discharge with home health for PT/OT/Speech. Medications sent to preferred pharmacy. Pt to f/u with Dr. Idris Bernabe in BMT clinic who will adjust steroid taper. Currently down to prednisone 75 mg daily.

## 2017-12-28 NOTE — HPI
66 YO AAM with MM s/p PBSCT 2014 and longstanding hx of multifocal plasmocytoma on Lenalidomide with new skin lesions that have progressed over the last 2 months. Patient and wife states started on back as red smooth bumps which are asymptomatic. Denies any tenderness or itching. Was [facility] by outside dermatologist, [facility] Tinoco, [facility] 2017 with skin biopsy of medial suprascapular back. 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 Since last visit with dermatology wife states the lesions have increased in size and have spread to other regions to involve arms, chest, abdomen, and face.  Not using anything on lesions and denies that any are resolving but is in fact getting more    Patient also endorses weight loss of 20 pounds, subjective chills and fevers. Also with dysphagia secondary to cervical fracture chronic joint pain . Denies any changes in medications except that he has been of the lenalidomide for ~ 3 to 4 weeks for thrombocytopenia.

## 2017-12-28 NOTE — HPI
Pt admitted from bmt clinic after seeing Dr. Idris Bernabe for expedited work up. Follows with Dr. Patricia in [place], [place]. Hx of multiple myeloma with auto SCT 3 years ago. Now with possible concerns for lymphoma (cutaneous?). Denies SOB, chest pain, swelling. With weight loss and fatigue, as well as fevers/chills. With numerous vesicular lesions on trunk, arms, head, above groin (started about 1.5 months ago). Previously had skin bx 11/2017. Reported to show CD30+ lymphoproliferative disorder. Will need bm bx, repeat skin bx, skeletal survey, and to start prednisone 1 mg/kg/day after biopsies are performed. States he has been off revlimid for 3-4 weeks due to thrombocytopenia. See oncologic hx

## 2017-12-28 NOTE — SUBJECTIVE & OBJECTIVE
Past Medical History:   Diagnosis Date    Cancer     CHF (congestive heart failure)     Depression     GERD (gastroesophageal reflux disease)     High cholesterol     Hypertension     Left ventricular ejection fraction less than 40% 8/23/2017    Multiple myeloma     Renal disorder     Stroke     no sequelae       Past Surgical History:   Procedure Laterality Date    BACK SURGERY  1/2000    metal julieth at T-4    CHOLECYSTECTOMY      HEMORRHOID SURGERY      KYPHOSIS SURGERY  2010    PROSTATE SURGERY      SPINE SURGERY      TONSILLECTOMY       Family History     Problem Relation (Age of Onset)    Heart disease Mother    Hypertension Mother    Kidney disease Mother    Scoliosis Daughter    Stroke Father        Social History Main Topics    Smoking status: Former Smoker     Packs/day: 1.00     Years: 40.00     Quit date: 11/15/2008    Smokeless tobacco: Never Used    Alcohol use No      Comment: rare    Drug use: No    Sexual activity: Yes     Partners: Female       Review of patient's allergies indicates:   Allergen Reactions    Keflex [cephalexin] Rash     Medications:  Continuous Infusions:  Scheduled Meds:   amitriptyline  50 mg Oral QHS    carvedilol  6.25 mg Oral BID    famotidine  20 mg Oral BID    [START ON 12/29/2017] finasteride  5 mg Oral After lunch    [START ON 12/29/2017] fluticasone-vilanterol  1 puff Inhalation Daily    [START ON 12/29/2017] furosemide  20 mg Oral Daily    gabapentin  300 mg Oral TID    galantamine  16 mg Oral BID WM    lidocaine HCL 20 mg/ml (2%)  10 mL Other Once    [START ON 12/29/2017] lisinopril  10 mg Oral Daily    memantine  5 mg Oral BID    OLANZapine  7.5 mg Oral Nightly    travoprost  1 drop Both Eyes Nightly     PRN Meds:diazePAM, sodium chloride 0.9%    Review of Systems   Constitutional: Positive for fever, chills and weight loss.   Gastrointestinal: Positive for nausea.   Musculoskeletal: Positive for arthralgias.   Skin: Positive for rash.  Negative for itching.   Hematologic/Lymphatic: Negative for adenopathy.     Objective:     Vital Signs (Most Recent):  Temp: 99.2 °F (37.3 °C) (12/28/17 1538)  Pulse: (!) 123 (12/28/17 1538)  Resp: 18 (12/28/17 1538)  BP: 108/68 (12/28/17 1538)  SpO2: 98 % (12/28/17 1538) Vital Signs (24h Range):  Temp:  [99.2 °F (37.3 °C)-99.4 °F (37.4 °C)] 99.2 °F (37.3 °C)  Pulse:  [112-123] 123  Resp:  [18] 18  SpO2:  [98 %] 98 %  BP: (108-128)/(68-72) 108/68        There is no height or weight on file to calculate BMI.    Physical Exam   Constitutional: He appears well-developed.   Neurological: He is alert.   Psychiatric: He has a normal mood and affect.   Skin:   Areas Examined (abnormalities noted in diagram):   Scalp / Hair Palpated and Inspected  Head / Face Inspection Performed  Neck Inspection Performed  Chest / Axilla Inspection Performed  Abdomen Inspection Performed  Genitals / Buttocks / Groin Inspection Performed  Back Inspection Performed  RUE Inspected  LUE Inspection Performed  RLE Inspected  LLE Inspection Performed                                      Significant Labs: All pertinent labs within the past 24 hours have been reviewed.

## 2017-12-29 LAB
ABO + RH BLD: NORMAL
ALBUMIN SERPL BCP-MCNC: 2.4 G/DL
ALP SERPL-CCNC: 148 U/L
ALT SERPL W/O P-5'-P-CCNC: 14 U/L
ANION GAP SERPL CALC-SCNC: 11 MMOL/L
ANISOCYTOSIS BLD QL SMEAR: SLIGHT
AST SERPL-CCNC: 19 U/L
BASOPHILS # BLD AUTO: 0 K/UL
BASOPHILS NFR BLD: 0 %
BILIRUB SERPL-MCNC: 0.4 MG/DL
BLD GP AB SCN CELLS X3 SERPL QL: NORMAL
BONE MARROW IRON STAIN COMMENT: NORMAL
BUN SERPL-MCNC: 18 MG/DL
CALCIUM SERPL-MCNC: 9.5 MG/DL
CHLORIDE SERPL-SCNC: 101 MMOL/L
CO2 SERPL-SCNC: 24 MMOL/L
CREAT SERPL-MCNC: 1 MG/DL
DIFFERENTIAL METHOD: ABNORMAL
DNA/RNA EXTRACT AND HOLD RESULT: NORMAL
DNA/RNA EXTRACTION: NORMAL
EOSINOPHIL # BLD AUTO: 0 K/UL
EOSINOPHIL NFR BLD: 0 %
ERYTHROCYTE [DISTWIDTH] IN BLOOD BY AUTOMATED COUNT: 16 %
EST. GFR  (AFRICAN AMERICAN): >60 ML/MIN/1.73 M^2
EST. GFR  (NON AFRICAN AMERICAN): >60 ML/MIN/1.73 M^2
EXHR SPECIMEN TYPE: NORMAL
GLUCOSE SERPL-MCNC: 157 MG/DL
HCT VFR BLD AUTO: 25.3 %
HGB BLD-MCNC: 8.2 G/DL
IMM GRANULOCYTES # BLD AUTO: 0.01 K/UL
IMM GRANULOCYTES NFR BLD AUTO: 0.2 %
LYMPHOCYTES # BLD AUTO: 0.7 K/UL
LYMPHOCYTES NFR BLD: 17.4 %
MAGNESIUM SERPL-MCNC: 1.8 MG/DL
MCH RBC QN AUTO: 31.4 PG
MCHC RBC AUTO-ENTMCNC: 32.4 G/DL
MCV RBC AUTO: 97 FL
MONOCYTES # BLD AUTO: 0.1 K/UL
MONOCYTES NFR BLD: 3.3 %
NEUTROPHILS # BLD AUTO: 3.3 K/UL
NEUTROPHILS NFR BLD: 79.1 %
NRBC BLD-RTO: 0 /100 WBC
OVALOCYTES BLD QL SMEAR: ABNORMAL
PHOSPHATE SERPL-MCNC: 2.7 MG/DL
PLATELET # BLD AUTO: 29 K/UL
PLATELET BLD QL SMEAR: ABNORMAL
PMV BLD AUTO: ABNORMAL FL
POIKILOCYTOSIS BLD QL SMEAR: SLIGHT
POTASSIUM SERPL-SCNC: 4.4 MMOL/L
PROT SERPL-MCNC: 7.7 G/DL
RBC # BLD AUTO: 2.61 M/UL
SCHISTOCYTES BLD QL SMEAR: ABNORMAL
SCHISTOCYTES BLD QL SMEAR: PRESENT
SODIUM SERPL-SCNC: 136 MMOL/L
WBC # BLD AUTO: 4.19 K/UL

## 2017-12-29 PROCEDURE — 0HB6XZX EXCISION OF BACK SKIN, EXTERNAL APPROACH, DIAGNOSTIC: ICD-10-PCS | Performed by: DERMATOLOGY

## 2017-12-29 PROCEDURE — G8996 SWALLOW CURRENT STATUS: HCPCS | Mod: CJ

## 2017-12-29 PROCEDURE — 99233 SBSQ HOSP IP/OBS HIGH 50: CPT | Mod: ,,, | Performed by: INTERNAL MEDICINE

## 2017-12-29 PROCEDURE — 25500020 PHARM REV CODE 255: Performed by: STUDENT IN AN ORGANIZED HEALTH CARE EDUCATION/TRAINING PROGRAM

## 2017-12-29 PROCEDURE — 20600001 HC STEP DOWN PRIVATE ROOM

## 2017-12-29 PROCEDURE — G8997 SWALLOW GOAL STATUS: HCPCS | Mod: CJ

## 2017-12-29 PROCEDURE — 85025 COMPLETE CBC W/AUTO DIFF WBC: CPT

## 2017-12-29 PROCEDURE — 92610 EVALUATE SWALLOWING FUNCTION: CPT

## 2017-12-29 PROCEDURE — 63600175 PHARM REV CODE 636 W HCPCS: Performed by: INTERNAL MEDICINE

## 2017-12-29 PROCEDURE — 84100 ASSAY OF PHOSPHORUS: CPT

## 2017-12-29 PROCEDURE — 25500020 PHARM REV CODE 255: Performed by: INTERNAL MEDICINE

## 2017-12-29 PROCEDURE — 25000003 PHARM REV CODE 250: Performed by: NURSE PRACTITIONER

## 2017-12-29 PROCEDURE — 25000242 PHARM REV CODE 250 ALT 637 W/ HCPCS: Performed by: NURSE PRACTITIONER

## 2017-12-29 PROCEDURE — 86901 BLOOD TYPING SEROLOGIC RH(D): CPT

## 2017-12-29 PROCEDURE — 83735 ASSAY OF MAGNESIUM: CPT

## 2017-12-29 PROCEDURE — 97535 SELF CARE MNGMENT TRAINING: CPT

## 2017-12-29 PROCEDURE — 80053 COMPREHEN METABOLIC PANEL: CPT

## 2017-12-29 PROCEDURE — 36415 COLL VENOUS BLD VENIPUNCTURE: CPT

## 2017-12-29 RX ADMIN — FINASTERIDE 5 MG: 5 TABLET, FILM COATED ORAL at 12:12

## 2017-12-29 RX ADMIN — CARVEDILOL 6.25 MG: 6.25 TABLET, FILM COATED ORAL at 08:12

## 2017-12-29 RX ADMIN — GABAPENTIN 300 MG: 300 CAPSULE ORAL at 09:12

## 2017-12-29 RX ADMIN — TRAVOPROST 1 DROP: 0.04 SOLUTION/ DROPS OPHTHALMIC at 09:12

## 2017-12-29 RX ADMIN — CARVEDILOL 6.25 MG: 6.25 TABLET, FILM COATED ORAL at 09:12

## 2017-12-29 RX ADMIN — PREDNISONE 100 MG: 20 TABLET ORAL at 08:12

## 2017-12-29 RX ADMIN — AMITRIPTYLINE HYDROCHLORIDE 50 MG: 50 TABLET, FILM COATED ORAL at 09:12

## 2017-12-29 RX ADMIN — MEMANTINE HYDROCHLORIDE 5 MG: 5 TABLET ORAL at 09:12

## 2017-12-29 RX ADMIN — GALANTAMINE HYDROBROMIDE 16 MG: 4 TABLET, FILM COATED ORAL at 12:12

## 2017-12-29 RX ADMIN — IOHEXOL 15 ML: 350 INJECTION, SOLUTION INTRAVENOUS at 03:12

## 2017-12-29 RX ADMIN — IOHEXOL 100 ML: 350 INJECTION, SOLUTION INTRAVENOUS at 08:12

## 2017-12-29 RX ADMIN — GALANTAMINE HYDROBROMIDE 16 MG: 4 TABLET, FILM COATED ORAL at 09:12

## 2017-12-29 RX ADMIN — FLUTICASONE FUROATE AND VILANTEROL TRIFENATATE 1 PUFF: 100; 25 POWDER RESPIRATORY (INHALATION) at 10:12

## 2017-12-29 RX ADMIN — OLANZAPINE 7.5 MG: 5 TABLET, FILM COATED ORAL at 09:12

## 2017-12-29 RX ADMIN — FUROSEMIDE 20 MG: 20 TABLET ORAL at 08:12

## 2017-12-29 RX ADMIN — GABAPENTIN 300 MG: 300 CAPSULE ORAL at 06:12

## 2017-12-29 RX ADMIN — GALANTAMINE HYDROBROMIDE 16 MG: 4 TABLET, FILM COATED ORAL at 07:12

## 2017-12-29 RX ADMIN — GABAPENTIN 300 MG: 300 CAPSULE ORAL at 03:12

## 2017-12-29 RX ADMIN — IOHEXOL 15 ML: 350 INJECTION, SOLUTION INTRAVENOUS at 04:12

## 2017-12-29 RX ADMIN — FAMOTIDINE 20 MG: 20 TABLET, FILM COATED ORAL at 09:12

## 2017-12-29 RX ADMIN — HYDROCODONE BITARTRATE AND ACETAMINOPHEN 2 TABLET: 7.5; 325 TABLET ORAL at 09:12

## 2017-12-29 RX ADMIN — MEMANTINE HYDROCHLORIDE 5 MG: 5 TABLET ORAL at 08:12

## 2017-12-29 RX ADMIN — LISINOPRIL 10 MG: 10 TABLET ORAL at 08:12

## 2017-12-29 RX ADMIN — FAMOTIDINE 20 MG: 20 TABLET, FILM COATED ORAL at 08:12

## 2017-12-29 NOTE — ASSESSMENT & PLAN NOTE
Chronic  - He has ongoing swallowing problems  - He has refused a G-tube on multiple occasions in the past per chart  - SLP to evaluate and treat

## 2017-12-29 NOTE — ASSESSMENT & PLAN NOTE
See oncologic hx on H&P for more information  - had silva auto 6/25/14   - was in CR at 1 year  - hx of plasmacytomas prior to  - maintenance Revlimid on hold for 3-4 weeks due to thrombocytopenia. Will continue to hold at this time  - now with BLE weakness, inability to walk--PT/OT ordered  - BM bx performed 12/28/17, pending results

## 2017-12-29 NOTE — ASSESSMENT & PLAN NOTE
- possible dx due to previous skin bx from 11/2017 reported to Dr. Patricia as CD30+ lymphoproliferative disorder  - repeat skin bx done by derm 12/28/17, pending results  - BM bx performed 12/28/17 with flow, cyto, dna/rna hold, and t cell gene rearrangement, pending results  - skeletal survey ordered 12/28/17, pending exam  - to start on prednisone 1 mg/kg/day 12/29/17 (100 mg daily)

## 2017-12-29 NOTE — ASSESSMENT & PLAN NOTE
- had silva auto SCT 6/25/14 for myeloma  - 1 year restaging marrow on 7/24/15 showed 40% cellularity with trilineage hematopoiesis and no morphologic evidence of plasma cells.  Cytogenetics could not be done as the metaphase cells did not grow.   - PET CT did not reveal any evidence of myeloma at 1 year

## 2017-12-29 NOTE — ASSESSMENT & PLAN NOTE
Anemia and thrombocytopenia possibly 2/2 disease, relapse, or revlimid  - Revlimid has been on hold for 3-4 weeks due to thrombocytopenia  - Transfuse for hgb <7, plt <10K   - with schistocytes on cbc, will ask for slide so staff can evaluate

## 2017-12-29 NOTE — PLAN OF CARE
Problem: Patient Care Overview  Goal: Plan of Care Review  Outcome: Ongoing (interventions implemented as appropriate)  Patient is AAOx4, AFVSS. Patient has quarter sized lesions/pustules scattered over torso and arms. Patient is able to reposition self with some assistance, feels weak in the knees when standing. Wife is at bedside at all times, bed alarm set and pt instructed to call for assistance. Pt has good appetite and UOP. No complaints of pain. Safety precautions in place. No acute events during shift. Will continue to monitor.

## 2017-12-29 NOTE — PT/OT/SLP EVAL
Speech Language Pathology Evaluation  Bedside Swallow    Patient Name:  Lane Nunez Jr.   MRN:  [id number]  Admitting Diagnosis: Neoplasm of uncertain behavior of skin    Recommendations:                 General Recommendations:  Dysphagia therapy; possible MBSS early next week  Diet recommendations:  Regular, Nectar Thick   Aspiration Precautions: 1 bite/sip at a time, Alternating bites/sips, Assistance with thickening liquids, Avoid talking while eating, Double swallow with each bite/sip, Eliminate distractions, Feed only when awake/alert, HOB to 90 degrees, Meds whole buried in puree, Monitor for s/s of aspiration, No straws, Remain upright 30 minutes post meal, Small bites/sips and Strict aspiration precautions   General Precautions: Standard, aspiration, nectar thick  Communication strategies:  none and provide increased time to answer    History:     Past Medical History:   Diagnosis Date    Cancer     CHF (congestive heart failure)     Depression     GERD (gastroesophageal reflux disease)     High cholesterol     Hypertension     Left ventricular ejection fraction less than 40% 8/23/2017    Multiple myeloma     Renal disorder     Stroke     no sequelae       Past Surgical History:   Procedure Laterality Date    BACK SURGERY  1/2000    metal julieth at T-4    CHOLECYSTECTOMY      HEMORRHOID SURGERY      KYPHOSIS SURGERY  2010    PROSTATE SURGERY      SPINE SURGERY      TONSILLECTOMY         Social History: Patient lives with wife.    Prior Intubation HX:  None during this hospitalization    Modified Barium Swallow: 11/29/13 with the following recommendations:   Recommendations:  Pt is high risk for aspiration and possible reduction in po intake 2/2 severe vallecula stasis requiring 7-8 swallows to minimally clear the stasis. May consider short term alternative means of nutrition with pleasure feeds of full liquid thickend to nectar thick consistency. If alternative means are not an option, safest  "po diet is full liquid with all liquids thickened to nectar thick consistency. Strict aspiration precautions. PO meds crushed in puree and presented 1/4 tsp at a time. Ensure pt swallows 7-8 times after each presentation. Consider dietitician consult.     Chest X-Rays: 11/28/17: Impression    Minimal patchy infiltrate or edema or discoid atelectasis at the right base     12/5/17:Impression   No active disease.       Prior diet: regular/nectar thick liquids. Per pt and wife, pt has been mostly compliant with recommendations for nectar thick liquids since 2013.  At times, pt will not add thickener to coffee or soft drinks. Pt's wife states pt tolerated soft drinks better without the thickener.      Subjective     "I used to feel it get stuck on the left side."     Objective:     Oral Musculature Evaluation  · Oral Musculature: WFL  · Dentition: present and adequate  · Mucosal Quality: good  · Mandibular Strength and Mobility: impaired (mildly decreased ROM)  · Oral Labial Strength and Mobility: WFL  · Lingual Strength and Mobility: WFL  · Buccal Strength and Mobility: WFL  · Volitional Cough: decreased force  · Volitional Swallow: elicited   · Voice Prior to PO Intake: dry, muffled quality    Bedside Swallow Eval:   Consistencies Assessed:  · Thin liquids tsp x 1, cup sips x 2  · Nectar thick liquids multiple cup sips (approx 3oz)  · Puree multiple 1/2 and full tsp boluses  · Mixed consistencies 3-4 pills buried whole in tsp applesauce x 3  · Solids 1/4 mireya cracker     Oral Phase:   · Prolonged mastication  · Slow oral transit time    Pharyngeal Phase:   · double swallows per bolus for every consistency  · delayed swallow initation    Compensatory Strategies  · Multiple swallows    Treatment: Education was provided to pt and wife regarding swallowing assessment, results and recommendations from previous MBSS (11/29/2013), diet recommendations, safe swallowing strategies, recommendations to possible repeat MBSS next " week to further assess pt's current swallowing function, and SLP POC.  Pt and wife expressed good understanding and were in agreement with plan. White board updated.    Assessment:     Lane Nunez Jr. is a 67 y.o. male with an SLP diagnosis of Dysphagia (chronic).    Goals:    SLP Goals        Problem: SLP Goal    Goal Priority Disciplines Outcome   SLP Goal     SLP    Description:  Speech Language Pathology Goals  Goals expected to be met by 1/5:  1. Pt will tolerate regular consistency diet and nectar thick liquids without s/s of aspiration.   2. Pt will participate in ongoing swallowing assessment to determine if Modified Barium Swallow Study is warranted to further assess pt's current swallowing function.                        Plan:     · Patient to be seen:  5 x/week   · Plan of Care expires:  01/27/18  · Plan of Care reviewed with:  patient, spouse   · SLP Follow-Up:  Yes       Discharge recommendations:   (tbd )       Time Tracking:     SLP Treatment Date:   12/29/17  Speech Start Time:  0853  Speech Stop Time:  0919     Speech Total Time (min):  26 min    Billable Minutes: Eval Swallow and Oral Function 11 and Seld Care/Home Management Training 15    HARIS Thorne, CCC-SLP  12/29/2017     HARIS Thorne, CCC-SLP  Speech Language Pathologist  [phone]  12/29/2017

## 2017-12-29 NOTE — ASSESSMENT & PLAN NOTE
Anemia and thrombocytopenia possibly 2/2 disease, relapse, or revlimid  - Revlimid has been on hold for 3-4 weeks due to thrombocytopenia  - Transfuse for hgb <7, plt <10K

## 2017-12-29 NOTE — PROGRESS NOTES
[facility]  Hematology  Bone Marrow Transplant  Progress Note    Patient Name: Lane Nunez Jr.  Admission Date: 12/28/2017  Hospital Length of Stay: 1 days  Code Status: Full Code    Subjective:     Interval History:   -Skeletal survey without evidence of metastatic disease  -Skin bx and BM bx both pending.   -Started on steroids pred 100 mg/day. No issues overnight    Objective:     Vital Signs (Most Recent):  Temp: 98.1 °F (36.7 °C) (12/29/17 0754)  Pulse: 102 (12/29/17 0754)  Resp: 18 (12/29/17 0754)  BP: 130/70 (12/29/17 0754)  SpO2: 97 % (12/29/17 0754) Vital Signs (24h Range):  Temp:  [98.1 °F (36.7 °C)-99.3 °F (37.4 °C)] 98.1 °F (36.7 °C)  Pulse:  [100-123] 102  Resp:  [16-18] 18  SpO2:  [96 %-98 %] 97 %  BP: (108-162)/(58-72) 130/70     Weight: 98 kg (216 lb 0.8 oz)  Body mass index is 28.5 kg/m².  Body surface area is 2.25 meters squared.    ECOG SCORE         [unfilled]    Intake/Output - Last 3 Shifts       12/27 0700 - 12/28 0659 12/28 0700 - 12/29 0659 12/29 0700 - 12/30 0659    Urine (mL/kg/hr)  400     Total Output   400      Net   -400             Urine Occurrence  2 x           Physical Exam   Constitutional: He is oriented to person, place, and time. He appears well-nourished. He appears lethargic. He is cooperative.   HENT:   Head: Normocephalic and atraumatic.   Eyes: Lids are normal.   Neck: Trachea normal and normal range of motion. Carotid bruit is not present.   Cardiovascular: Regular rhythm and normal heart sounds.  Tachycardia present.    HR 120s (now improved to 100s)  No distress noted   Pulmonary/Chest: Effort normal and breath sounds normal.   Abdominal: Normal appearance and bowel sounds are normal.   Musculoskeletal: Normal range of motion.   BLE weakness, unable to ambulate without total assit   Lymphadenopathy:   None palpable    Neurological: He is oriented to person, place, and time. He appears lethargic. Abnormal gait: Unable to ambulate without total assist.    Skin: Skin is dry. He is not diaphoretic. No pallor.        Numerous vesicular type lesions, derm to perform skin bx. See graphical documentation for some areas where lesions were found. Derm following   Psychiatric: He has a normal mood and affect. His speech is normal and behavior is normal. Judgment and thought content normal. Delayed: slow to respond. Cognition and memory are impaired. Depressed: somewhat flat affect.       Significant Labs:   Recent Lab Results       12/29/17  0344 12/28/17  1531 12/28/17  1136      Immature Granulocytes 0.2  0.6(H)     Immature Grans (Abs) 0.01  0.03     Bone Marrow Iron Stain Comment  See Anatomic Pathology bone marrow report.      Bone Marrow Griffith Stain Comment  See Anatomic Pathology bone marrow report.      Albumin 2.4(L)  2.4(L)     Alkaline Phosphatase 148(H)  158(H)     ALT 14  16     Anion Gap 11  10     Aniso Slight  Slight     AST 19  25     Baso # 0.00  0.01     Basophil% 0.0  0.2     Beta-2 Microglobulin   3.9(H)     Total Bilirubin 0.4  Comment:  For infants and newborns, interpretation of results should be based  on gestational age, weight and in agreement with clinical  observations.  Premature Infant recommended reference ranges:  Up to 24 hours.............<8.0 mg/dL  Up to 48 hours............<12.0 mg/dL  3-5 days..................<15.0 mg/dL  6-29 days.................<15.0 mg/dL    0.4  Comment:  For infants and newborns, interpretation of results should be based  on gestational age, weight and in agreement with clinical  observations.  Premature Infant recommended reference ranges:  Up to 24 hours.............<8.0 mg/dL  Up to 48 hours............<12.0 mg/dL  3-5 days..................<15.0 mg/dL  6-29 days.................<15.0 mg/dL       Body Site - Bone Marrow  LPI      BUN, Bld 18  19     Calcium 9.5  9.3     Chloride 101  102     Clinical Diagnosis - Bone Marrow  LYMPH      CO2 24  25     Creatinine 1.0  1.1     Differential Method Automated   Automated     eGFR if  >60.0  >60.0     eGFR if non  >60.0  Comment:  Calculation used to obtain the estimated glomerular filtration  rate (eGFR) is the CKD-EPI equation.     >60.0  Comment:  Calculation used to obtain the estimated glomerular filtration  rate (eGFR) is the CKD-EPI equation.        Eos # 0.0  0.0     Eosinophil% 0.0  0.0     Fragmented Cells Occasional       Glucose 157(H)  113(H)     Gran # 3.3  2.9     Gran% 79.1(H)  54.8     Group & Rh O NEG       Hematocrit 25.3(L)  27.3(L)     Hemoglobin 8.2(L)  8.8(L)     IgA   237  Comment:  IgA Cord Blood Reference Range: <5 mg/dL.     IgG - Serum   1227  Comment:  IgG Cord Blood Reference Range: 650-1600 mg/dL.     IgM   37  Comment:  IgM Cord Blood Reference Range: <25 mg/dL.(L)     INDIRECT CHRIS NEG       LD   890  Comment:  Results are increased in hemolyzed samples.(H)     Lymph # 0.7(L)  1.5     Lymph% 17.4(L)  29.2     Magnesium 1.8       MCH 31.4(H)  31.9(H)     MCHC 32.4  32.2     MCV 97  99(H)     Mono # 0.1(L)  0.8     Mono% 3.3(L)  15.2(H)     MPV SEE COMMENT  Comment:  Result not available.  10.1     nRBC 0  0     Ovalocytes Occasional       Phosphorus 2.7       Platelet Estimate Decreased(A)  Decreased(A)     Platelets 29  Comment:  PLT   critical result(s) called and verbal readback obtained from   Beto Kulkarni RN, 12/29/2017 06:28  (LL)  29  Comment:  PLATELET COUNT critical result(s) called and verbal readback obtained   from LINDA JACOME RN, 12/28/2017 12:29  (LL)     Poik Slight       Potassium 4.4  3.9     Total Protein 7.7  7.9     Protein, Serum   7.0     RBC 2.61(L)  2.76(L)     RDW 16.0(H)  16.2(H)     Reason for Referral  lymphoma      Schistocytes Present       Sodium 136  137     WBC 4.19  5.20           Diagnostic Results:  I have reviewed all pertinent imaging results/findings within the past 24 hours.     Skeletal survey 12/28/18  No metastatic disease    Skin bx 12/28/17  Pending    BM Bx  12/28/17 with dna/rna hold plus t cell gene rearrangement  Pending    Assessment/Plan:     * Neoplasm of uncertain behavior of skin    - with multiple lesions as demonstrated in graphical documentation on physical exam, first seen about 1.5 months ago  - previously bx'd at outside hospital 11/2017, unsure which facility per family, no records. Was reported by Dr. Patricia to be CD30+ lymphoproliferative disorder   - derm consulted for additional skin bx, appreciate bx and recs  - pending results from skin bx on 12/28/17         T-cell lymphoma    - possible dx due to previous skin bx from 11/2017 reported to Dr. Patricia as CD30+ lymphoproliferative disorder  - repeat skin bx done by derm 12/28/17, pending results  - BM bx performed 12/28/17 with flow, cyto, dna/rna hold, and t cell gene rearrangement, pending results  - skeletal survey ordered 12/28/17, did not show metastatic disease  - started on prednisone 1 mg/kg/day 12/29/17 (100 mg daily)         History of peripheral stem cell transplant    - had silva auto SCT 6/25/14 for myeloma  - 1 year restaging marrow on 7/24/15 showed 40% cellularity with trilineage hematopoiesis and no morphologic evidence of plasma cells.  Cytogenetics could not be done as the metaphase cells did not grow.   - PET CT did not reveal any evidence of myeloma at 1 year         Multiple myeloma in remission    See oncologic hx on H&P for more information  - had silva auto 6/25/14   - was in CR at 1 year  - hx of plasmacytomas prior to  - maintenance Revlimid on hold for 3-4 weeks due to thrombocytopenia. Will continue to hold at this time  - now with BLE weakness, inability to walk--PT/OT ordered  - BM bx performed 12/28/17, pending results         Cytopenia    Anemia and thrombocytopenia possibly 2/2 disease, relapse, or revlimid  - Revlimid has been on hold for 3-4 weeks due to thrombocytopenia  - Transfuse for hgb <7, plt <10K   - with schistocytes on cbc, will ask for slide so staff can  evaluate        Glaucoma    Per spouse  - continue home eye drops          Neuropathy    - continue home gabapentin and norco  - unable to walk without total assist  - PT/OT          Dysphagia    Chronic  - He has ongoing swallowing problems  - He has refused a G-tube on multiple occasions in the past per chart  - SLP to evaluate and treat         Cardiomyopathy    - daily weights  - lasix 20 mg daily, will replace K if needed on a daily basis  - hx of decreased EF 30-40%         Hypertension    - continue home coreg and lisinopril          BPH (benign prostatic hyperplasia)    - continue home finasteride          Memory impairment    Per spouse, pt with demenia  - continue home memantine and galantamine         Depression    - continue home amitriptyline, olanzapine            VTE Risk Mitigation         Ordered     High Risk of VTE  Once      12/28/17 1534     Place sequential compression device  Until discontinued      12/28/17 1534     Place DARIUS hose  Until discontinued      12/28/17 1534          Disposition: pending results of skin and BM bx    Ivonne Curtis NP  Bone Marrow Transplant  [facility]

## 2017-12-29 NOTE — PROGRESS NOTES
HEMATOLOGIC MALIGNANCIES PROGRESS NOTE    IDENTIFYING STATEMENT   Lane Nunez Jr. (Lane) is a 67 y.o. male with a  of 1950 from [place] with the diagnosis of multiple myeloma.      ONCOLOGY HISTORY:    1. Malone light chain multiple myeloma s/p autologous stem cell transplant   A.  - Isolated plasmacytoma of the T-spine   B. 2010: Developed low back pain. MRI showed L5 compression fracture and marrow signal. BMBx at that time showed no significant plasma cell population.   C. 2011: Metastatic skeletal survey showed a lytic lesion in the L humerus, lucent changes in the calvarium, and possible lytic lesion at the R inferior pubic ramus.   D. 2013: MRI showed multiple lytic lesions throughout the spine, ribs, bones, with significant expansion of th C2 vertebral body wtih some soft tissue anterior and posterior with some cord effacement. Small nodes were seen in the L supraclavicular region, largest 2 x 1.8 cm.    E. 2013: MRI C,T-spine showed multifocal osseous lesions throughout with dsic bulging at C3-4 and C4-5.    F. 9/3/2013: Kappa light chains 2577 (units?), lambda 11.83, ratio 217. SPEP shows no M-protein.   G. 2013: BMBx - 4% monoclonal plasma cell population. FISH negative.    H. 2013: Initial consult with Dr. Nickolas PARKER.   10/24/2013: Biopsy of lytic bone lesion (spine) - sheets of plasma cells consistent with plasma cell neoplasm.    J. 11/15/2013: Presented to [facility] with cervical compression fracture. Cervical fusion surgery performed.    K. 13: Began bortezomib + dexamethasone therapy.    L. 2014: Restaging at completion of therapy (pre-transplant); BMBx - 50-60% cellular marrow with no evidence of plasma cell dyscrasia. SPEP and ELENO normal. Kappa light chains 1.39 mg/dl, lambda 0.79, ratio 1.76.    M. 2014: autologous stem cell transplant with melphalan conditioning. Subsequently started on maintenance lenalidomide.   N. 2015: 1-year restaging; BMBx -  5-6% plasma cells; SPEP measures no M-protein. ELENO shows faint irregularity involving IgG lambda (not kappa), kappa light chains 2.37 mg/dl, lambda 1.07, ratio 2.21. Findings are consistent with very good partial response by IMWG criteria.    O. 6/27/2016: Kappa light chains 3.7 mg/dl, lambda 1.51, ratio 2.45. PET/CT shows multiple lytic bone lesions involving axial and appendicular skeleton with no corresponding FDG hypometabolism, compatible with treated multiple myeloma.    P. 12/27/2016: Kappa light chains 5.1 mg/dl, lambda 1.73, ratio 2.95   Q. 7/6/2017: Kappa 3.46 mg/dl, lambda 2.29, ratio 1.51   R. 11/21/2017: Kappa 44.87, lambda 29.36, ratio 1.53     2. T-cell lymphoproliferative disorder   A. 10/2017: Began feeling weak. Noticed developing skin lesions   B. 11/8/2017: Skin biopsy - CD30+ lymphoproliferative disorder. 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 History of cervical spine fracture and fusion surgery related to multiple myeloma above    INTERVAL HISTORY:      Mr. Nunez returns to clinic for follow-up after developing new symptoms of fatigue, weakness, and skin nodules. Approximately two months ago, he began feeling weak and worse overall. He has lost his appetite and has lost over 20 pounds during this time. As his weakness has been developing, he developed the appearance of new skin nodules on his upper body. He eventually sought attention for these and had a biopsy performed on 11/8. He continued to decline, and he can no longer stand up on his own. He is in a wheelchair at home now, whereas he was walking normally several months ago. He states he feels awful. No lymphadenopathy.    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 He followed up with his local oncologist, Dr. Patricia, who recommended he come to [facility] for further evaluation. He is a  former patient of Dr. Olmos, and due to his leaving [facility] very recently, he is now meeting me to establish care.     Mr. Nunez is accompanied by his daughter and wife, who state that they have had to provide all care for him. They have a hard time managing to do this because he is significantly larger than they are, and his weakness is so profound that he can barely participate in his own care. They are very worried about his health.     Past Medical History, Past Social History and Past Family History have been reviewed and are unchanged except as noted in the interval history.    MEDICATIONS:     No current facility-administered medications on file prior to visit.      Current Outpatient Prescriptions on File Prior to Visit   Medication Sig Dispense Refill    amitriptyline (ELAVIL) 50 MG tablet Take 50 mg by mouth every evening.      aspirin (ECOTRIN) 81 MG EC tablet Take 81 mg by mouth every morning.       budesonide-formoterol 160-4.5 mcg (SYMBICORT) 160-4.5 mcg/actuation HFAA Inhale 2 puffs into the lungs every 12 (twelve) hours. Controller      carvedilol (COREG) 3.125 MG tablet Take 6.25 mg by mouth 2 (two) times daily.      cholecalciferol, vitamin D3, (VITAMIN D3) 2,000 unit Cap Take 1 capsule by mouth after lunch.       dexamethasone (DECADRON) 4 MG Tab Take 1 tablet (4 mg total) by mouth once a week. 30 tablet 6    diazePAM (VALIUM) 5 MG tablet Take 1 tablet (5 mg total) by mouth every 6 (six) hours as needed for Anxiety. 60 tablet 0    finasteride (PROSCAR) 5 mg tablet Take 5 mg by mouth after lunch.       furosemide (LASIX) 20 MG tablet Take 1 tablet (20 mg total) by mouth once daily. 30 tablet 3    gabapentin (NEURONTIN) 300 MG capsule Take 300 mg by mouth 3 (three) times daily.      galantamine (RAZADYNE) 8 MG Tab Take 16 mg by mouth 2 (two) times daily with meals.       hydrocodone-acetaminophen 7.5-325mg (NORCO) 7.5-325 mg per tablet Take  2 tabs every 6 hours as needed for pain 60  tablet 0    lisinopril (PRINIVIL,ZESTRIL) 5 MG tablet Take 10 mg by mouth once daily.       memantine (NAMENDA) 5 MG Tab Take 5 mg by mouth 2 (two) times daily.      MULTIVIT &MINERALS/FERROUS FUM (MULTI VITAMIN ORAL) Take by mouth.      olanzapine (ZYPREXA) 15 MG Tab Take 7.5 mg by mouth nightly.      potassium chloride SA (K-DUR,KLOR-CON) 10 MEQ tablet Take 10 mEq by mouth once daily.  3    ranitidine (ZANTAC) 150 MG capsule Take 150 mg by mouth 2 (two) times daily.      TRAVATAN Z 0.004 % Drop Place 1 drop into both eyes nightly.  4    venlafaxine 150 mg TR24 Take 150 mg by mouth once daily.      vitamin E 1000 UNIT capsule Take 1,000 Units by mouth after lunch.       naproxen (EC NAPROSYN) 500 MG EC tablet   2    potassium chloride (KLOR-CON) 10 MEQ TbSR Take 1 tablet (10 mEq total) by mouth once daily. 30 tablet 3    REVLIMID 10 mg Cap Take 1 capsule (10mg) by mouth daily for 21 days, then rest for 7 days. 21 each 0       ALLERGIES:   Review of patient's allergies indicates:   Allergen Reactions    Keflex [cephalexin] Rash        ROS:       Review of Systems   Constitutional: Positive for fatigue and unexpected weight change. Negative for diaphoresis and fever.   HENT:   Negative for lump/mass and sore throat.    Eyes: Negative for icterus.   Respiratory: Negative for cough and shortness of breath.    Cardiovascular: Negative for chest pain and palpitations.   Gastrointestinal: Positive for abdominal distention. Negative for constipation, diarrhea, nausea and vomiting.        Loss of appetite   Genitourinary: Negative for dysuria and frequency.    Musculoskeletal: Positive for gait problem. Negative for arthralgias and myalgias.   Skin: Negative for rash.        Nodules have formed throughout his upper body   Neurological: Positive for extremity weakness and gait problem. Negative for dizziness and headaches.   Hematological: Negative for adenopathy. Does not bruise/bleed easily.  "  Psychiatric/Behavioral: The patient is not nervous/anxious.        PHYSICAL EXAM:  Vitals:    12/28/17 0921   BP: 108/68   Pulse: (!) 112   Resp: 18   Temp: 99.4 °F (37.4 °C)   TempSrc: Oral   SpO2: 98%   Weight: 100.2 kg (220 lb 14.4 oz)   Height: 6' 1" (1.854 m)   PainSc:   7   PainLoc: Back       Physical Exam   Constitutional: He is oriented to person, place, and time.   Appears fatigued.    HENT:   Head: Normocephalic and atraumatic.   Mouth/Throat: Oropharynx is clear and moist and mucous membranes are normal. No oral lesions.   Temporal wasting is present   Eyes: Conjunctivae are normal.   Neck: No thyromegaly present.   Cardiovascular: Regular rhythm and normal heart sounds.    No murmur heard.  Tachycardic   Pulmonary/Chest: Breath sounds normal. He has no wheezes. He has no rales.   Abdominal: Soft. He exhibits no distension and no mass. There is no splenomegaly or hepatomegaly. There is no tenderness.   Lymphadenopathy:     He has no cervical adenopathy.        Right cervical: No deep cervical adenopathy present.       Left cervical: No deep cervical adenopathy present.     He has no axillary adenopathy.        Right: No inguinal adenopathy present.        Left: No inguinal adenopathy present.   Neurological: He is alert and oriented to person, place, and time. He has normal reflexes. No cranial nerve deficit. Coordination normal.   Markedly weak in bilateral lower extremities. Can barely lift legs against gravity. On standing, he nearly falls over.    Skin: No rash noted.   There are nodules across the skin of the upper body. These are present on the bilateral upper extremities, on the head, and on the chest, abdomen, and back. None are present on the legs.          LAB:   Results for orders placed or performed in visit on 12/12/17   CBC auto differential   Result Value Ref Range    WBC 4.50 3.90 - 12.70 K/uL    RBC 3.09 (L) 4.60 - 6.20 M/uL    Hemoglobin 10.2 (L) 14.0 - 18.0 g/dL    Hematocrit 31.1 " (L) 40.0 - 54.0 %     (H) 82 - 98 fL    MCH 32.8 (H) 27.0 - 31.0 pg    MCHC 32.7 32.0 - 36.0 g/dL    RDW 16.4 (H) 11.5 - 14.5 %    Platelets 43 (LL) 150 - 350 K/uL    MPV 8.5 (L) 9.2 - 12.9 fL    Gran # 2.1 1.8 - 7.7 K/uL    Lymph # 1.8 1.0 - 4.8 K/uL    Mono # 0.6 0.3 - 1.0 K/uL    Eos # 0.0 0.0 - 0.5 K/uL    Baso # 0.00 0.00 - 0.20 K/uL    nRBC 1 (A) 0 /100 WBC    Gran% 47.1 38.0 - 73.0 %    Lymph% 39.0 18.0 - 48.0 %    Mono% 13.5 4.0 - 15.0 %    Eosinophil% 0.0 0.0 - 8.0 %    Basophil% 0.4 0.0 - 1.9 %    Differential Method Automated        PROBLEMS ASSESSED THIS VISIT:    1. Lymphoproliferative disorder    2. Multiple myeloma in remission    3. History of peripheral stem cell transplant    4. Tachycardia    5. Weakness of both lower extremities        PLAN:       1. Lymphoproliferative disorder - Mr. Nunez has a T-cell lymphoproliferative disorder as diagnosed on recent skin biopsy. Unfortunately, we were not able to learn more information from the pathology. He has constitutional symptoms and declining performance status, so lymphomatoid papulosis seems unlikely as this has a benign course. We need a definitive diagnosis, and he will need staging with cross-sectional imaging, rebiopsy of the skin, and possibly other tissue biopsy. He should also have a bone marrow biopsy as well.    Given his profound weakness and inability to care for himself, he is unsafe to return home. I also fear that an outpatient workup may delay needed therapy to help him improve. For this reason, we will admit him for:  - CT scan of chest, abdomen, pelvis  - Skin biopsy by dermatology  - Bone marrow biopsy  - Other biopsies as deemed appropriate  - PT/OT/ST eval  - Initiate steroids (prednisone 1 mg/kg/day) after all biopsies obtained    2. Kappa light chains multiple myeloma, s/p autologous stem cell transplant - He appears to have maintained his response to transplant and had no evidence of progression at last exam. His  weakness could signify relapse, so we are repeating serologic staging of multiple myeloma today. Cross-sectional imaging will serve to help identify any potential new plasmacytomas or lytic lesions that may be impacting him.    3. Anemia  4. Thrombocytopenia    Secondary to his chronic disease process.    Follow-up to be determined upon hospital discharge.     Idris Bernabe MD  Hematology and Stem Cell Transplant

## 2017-12-29 NOTE — SUBJECTIVE & OBJECTIVE
Subjective:     Interval History:   -Skeletal survey without evidence of metastatic disease  -Skin bx and BM bx both pending.   -Started on steroids pred 100 mg/day. No issues overnight    Objective:     Vital Signs (Most Recent):  Temp: 98.1 °F (36.7 °C) (12/29/17 0754)  Pulse: 102 (12/29/17 0754)  Resp: 18 (12/29/17 0754)  BP: 130/70 (12/29/17 0754)  SpO2: 97 % (12/29/17 0754) Vital Signs (24h Range):  Temp:  [98.1 °F (36.7 °C)-99.3 °F (37.4 °C)] 98.1 °F (36.7 °C)  Pulse:  [100-123] 102  Resp:  [16-18] 18  SpO2:  [96 %-98 %] 97 %  BP: (108-162)/(58-72) 130/70     Weight: 98 kg (216 lb 0.8 oz)  Body mass index is 28.5 kg/m².  Body surface area is 2.25 meters squared.    ECOG SCORE         [unfilled]    Intake/Output - Last 3 Shifts       12/27 0700 - 12/28 0659 12/28 0700 - 12/29 0659 12/29 0700 - 12/30 0659    Urine (mL/kg/hr)  400     Total Output   400      Net   -400             Urine Occurrence  2 x           Physical Exam   Constitutional: He is oriented to person, place, and time. He appears well-nourished. He appears lethargic. He is cooperative.   HENT:   Head: Normocephalic and atraumatic.   Eyes: Lids are normal.   Neck: Trachea normal and normal range of motion. Carotid bruit is not present.   Cardiovascular: Regular rhythm and normal heart sounds.  Tachycardia present.    HR 120s (now improved to 100s)  No distress noted   Pulmonary/Chest: Effort normal and breath sounds normal.   Abdominal: Normal appearance and bowel sounds are normal.   Musculoskeletal: Normal range of motion.   BLE weakness, unable to ambulate without total assit   Lymphadenopathy:   None palpable    Neurological: He is oriented to person, place, and time. He appears lethargic. Abnormal gait: Unable to ambulate without total assist.   Skin: Skin is dry. He is not diaphoretic. No pallor.        Numerous vesicular type lesions, derm to perform skin bx. See graphical documentation for some areas where lesions were found. Derm  following   Psychiatric: He has a normal mood and affect. His speech is normal and behavior is normal. Judgment and thought content normal. Delayed: slow to respond. Cognition and memory are impaired. Depressed: somewhat flat affect.       Significant Labs:   Recent Lab Results       12/29/17  0344 12/28/17  1531 12/28/17  1136      Immature Granulocytes 0.2  0.6(H)     Immature Grans (Abs) 0.01  0.03     Bone Marrow Iron Stain Comment  See Anatomic Pathology bone marrow report.      Bone Marrow Griffith Stain Comment  See Anatomic Pathology bone marrow report.      Albumin 2.4(L)  2.4(L)     Alkaline Phosphatase 148(H)  158(H)     ALT 14  16     Anion Gap 11  10     Aniso Slight  Slight     AST 19  25     Baso # 0.00  0.01     Basophil% 0.0  0.2     Beta-2 Microglobulin   3.9(H)     Total Bilirubin 0.4  Comment:  For infants and newborns, interpretation of results should be based  on gestational age, weight and in agreement with clinical  observations.  Premature Infant recommended reference ranges:  Up to 24 hours.............<8.0 mg/dL  Up to 48 hours............<12.0 mg/dL  3-5 days..................<15.0 mg/dL  6-29 days.................<15.0 mg/dL    0.4  Comment:  For infants and newborns, interpretation of results should be based  on gestational age, weight and in agreement with clinical  observations.  Premature Infant recommended reference ranges:  Up to 24 hours.............<8.0 mg/dL  Up to 48 hours............<12.0 mg/dL  3-5 days..................<15.0 mg/dL  6-29 days.................<15.0 mg/dL       Body Site - Bone Marrow  LPI      BUN, Bld 18  19     Calcium 9.5  9.3     Chloride 101  102     Clinical Diagnosis - Bone Marrow  LYMPH      CO2 24  25     Creatinine 1.0  1.1     Differential Method Automated  Automated     eGFR if  >60.0  >60.0     eGFR if non  >60.0  Comment:  Calculation used to obtain the estimated glomerular filtration  rate (eGFR) is the CKD-EPI  equation.     >60.0  Comment:  Calculation used to obtain the estimated glomerular filtration  rate (eGFR) is the CKD-EPI equation.        Eos # 0.0  0.0     Eosinophil% 0.0  0.0     Fragmented Cells Occasional       Glucose 157(H)  113(H)     Gran # 3.3  2.9     Gran% 79.1(H)  54.8     Group & Rh O NEG       Hematocrit 25.3(L)  27.3(L)     Hemoglobin 8.2(L)  8.8(L)     IgA   237  Comment:  IgA Cord Blood Reference Range: <5 mg/dL.     IgG - Serum   1227  Comment:  IgG Cord Blood Reference Range: 650-1600 mg/dL.     IgM   37  Comment:  IgM Cord Blood Reference Range: <25 mg/dL.(L)     INDIRECT CHRIS NEG       LD   890  Comment:  Results are increased in hemolyzed samples.(H)     Lymph # 0.7(L)  1.5     Lymph% 17.4(L)  29.2     Magnesium 1.8       MCH 31.4(H)  31.9(H)     MCHC 32.4  32.2     MCV 97  99(H)     Mono # 0.1(L)  0.8     Mono% 3.3(L)  15.2(H)     MPV SEE COMMENT  Comment:  Result not available.  10.1     nRBC 0  0     Ovalocytes Occasional       Phosphorus 2.7       Platelet Estimate Decreased(A)  Decreased(A)     Platelets 29  Comment:  PLT   critical result(s) called and verbal readback obtained from   Beto Kulkarni RN, 12/29/2017 06:28  (LL)  29  Comment:  PLATELET COUNT critical result(s) called and verbal readback obtained   from LINDA JACOME RN, 12/28/2017 12:29  (LL)     Poik Slight       Potassium 4.4  3.9     Total Protein 7.7  7.9     Protein, Serum   7.0     RBC 2.61(L)  2.76(L)     RDW 16.0(H)  16.2(H)     Reason for Referral  lymphoma      Schistocytes Present       Sodium 136  137     WBC 4.19  5.20           Diagnostic Results:  I have reviewed all pertinent imaging results/findings within the past 24 hours.     Skeletal survey 12/28/18  No metastatic disease    Skin bx 12/28/17  Pending    BM Bx 12/28/17 with dna/rna hold plus t cell gene rearrangement  Pending

## 2017-12-29 NOTE — ASSESSMENT & PLAN NOTE
- with multiple lesions as demonstrated in graphical documentation on physical exam, first seen about 1.5 months ago  - previously bx'd at outside hospital 11/2017, unsure which facility per family, no records. Was reported by Dr. 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

## 2017-12-29 NOTE — ASSESSMENT & PLAN NOTE
- daily weights  - lasix 20 mg daily, will replace K if needed on a daily basis  - hx of decreased EF 30-40%

## 2017-12-29 NOTE — ASSESSMENT & PLAN NOTE
- possible dx due to previous skin bx from 11/2017 reported to Dr. 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

## 2017-12-29 NOTE — PROGRESS NOTES
Admit Assessment    Patient Identification  Lane Nunez Jr.   :  1950  Admit Date:  2017  Attending Provider:  Yulissa Pisano MD              Referral:   Pt was admitted to  with a diagnosis of Neoplasm of uncertain behavior of skin, and was admitted this hospital stay due to T-cell lymphoma [C85.90].   is involved was referred to the Social Work Department via (Referal).  Patient presents as a 67 y.o. year old  male.    Persons interviewed: pt and spouse Maryjane    Living Situation:  Pt resides with Maryjane. They have 3 steps to enter the home. At baseline pt needs standby assist from the bed to the bathroom. Maryjane stated that it has been increasingly difficult for her to provide enough support. maryjane also stated that pt was unable to walk at all for a few days prior to admission.      Resides at [address] [address] [place] [address], phone: [phone] (home).      Functional Status Prior  Ambulation: 2-->assistive person  Transferrin-->assistive person  Toiletin-->assistive person  Bathin-->assistive person  Dressin-->assistive person  Eatin-->assistive person  Communication: 2-->difficulty speaking (not related to language barrier)  Swallowin-->difficulty swallowing liquids/foods    Current or Past Agencies and Description of Services/Supplies    DME  Equipment Currently Used at Home: cane, quad, walker, rolling    Home Health  Agency Name: [facility]  Agency Phone Number: [phone]  Services: nursing, PT and speech therapy    Pt has also been placed at [facility] in the past    IV Infusion  N/A    Nutrition: oral    Outpatient Pharmacy:     [id number] - [place] - [address] AT [facility]  [address]  [place] [address]  Phone: [phone] Fax: [phone]    [facility] - [place], [place] - [address]  [address]  [facility]  [address] [place] [address]  Phone: [phone]  Fax: [phone]      Patient Preference of agencies include none    Patient/Caregiver informed of right to choose providers or agencies.  Patient provides permission to release any necessary information to [facility] and to Non-[facility] agencies as needed to facilitate patient care, treatment planning, and patient discharge planning.  Written and verbal resources provided.      Coping  Pt was eating his lunch during assessment, spouse answered most of the questions, pt stated he was thankful for SW visit.    Adjustment to Diagnosis and Treatment  appropriate    Emotional/Behavioral/Cognitive Issues  None noted    History/Current Symptoms of Anxiety/Depression: Yes, anxiety depression  History/Current Substance Use:   Social History     Social History Main Topics    Smoking status: Former Smoker     Packs/day: 1.00     Years: 40.00     Quit date: 11/15/2008    Smokeless tobacco: Never Used    Alcohol use No      Comment: rare    Drug use: No    Sexual activity: Yes     Partners: Female       Indications of Abuse/Neglect: No  Abuse Screen  Do You Feel Unsafe at Home, Work or School?: no  Has Anyone Ever Threatened to Hurt You, Your Children or Your Pets?: no  Does Anyone Try to Keep You From Having Contact With Others or Doing Things Outside Your Home?: no    Financial:  Payor/Plan Subscr  Sex Relation Sub. Ins. ID Effective Group Num   1. [place] - [place]* HUYNHNAT JR. 1950 Male  [id number]                                    [address]   2. formerly [facility]* NAT HUYNH JR. 1950 Male  [id number]                                    [address]        Other identified concerns/needs: adequate caregiver assist at home, likely will need increased resources at home or possible rehab placement    Plan: TBD    Interventions/Referrals: HHC vs rehab vs SNF    Patient/caregiver engaged in treatment planning process.     providing psychosocial and supportive counseling, resources,  education, assistance and discharge planning as appropriate.  Patient/caregiver state understanding of  available resources,  following, remains available.

## 2017-12-29 NOTE — CONSULTS
[facility]  Dermatology  Consult Note    Patient Name: Lane Nunez Jr.  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 Patient and wife states started on back as red smooth bumps which are asymptomatic. Denies any tenderness or itching. Was seen by outside dermatologist, Dr. Tinoco, in [facility] Nov 8, 2017 with skin biopsy of medial suprascapular back. 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 Since last visit with dermatology wife states the lesions have increased in size and have spread to other regions to involve arms, chest, abdomen, and face.  Not using anything on lesions and denies that any are resolving but is in fact getting more    Patient also endorses weight loss of 20 pounds, subjective chills and fevers. Also with dysphagia secondary to cervical fracture chronic joint pain . Denies any changes in medications except that he has been of the lenalidomide for ~ 3 to 4 weeks for thrombocytopenia.     Past Medical History:   Diagnosis Date    Cancer     CHF (congestive heart failure)     Depression     GERD (gastroesophageal reflux disease)     High cholesterol     Hypertension     Left ventricular ejection fraction less than 40% 8/23/2017    Multiple myeloma     Renal disorder     Stroke     no sequelae       Past Surgical History:   Procedure Laterality Date    BACK SURGERY  1/2000    metal julieth at  T-4    CHOLECYSTECTOMY      HEMORRHOID SURGERY      KYPHOSIS SURGERY  2010    PROSTATE SURGERY      SPINE SURGERY      TONSILLECTOMY       Family History     Problem Relation (Age of Onset)    Heart disease Mother    Hypertension Mother    Kidney disease Mother    Scoliosis Daughter    Stroke Father        Social History Main Topics    Smoking status: Former Smoker     Packs/day: 1.00     Years: 40.00     Quit date: 11/15/2008    Smokeless tobacco: Never Used    Alcohol use No      Comment: rare    Drug use: No    Sexual activity: Yes     Partners: Female       Review of patient's allergies indicates:   Allergen Reactions    Keflex [cephalexin] Rash     Medications:  Continuous Infusions:  Scheduled Meds:   amitriptyline  50 mg Oral QHS    carvedilol  6.25 mg Oral BID    famotidine  20 mg Oral BID    [START ON 12/29/2017] finasteride  5 mg Oral After lunch    [START ON 12/29/2017] fluticasone-vilanterol  1 puff Inhalation Daily    [START ON 12/29/2017] furosemide  20 mg Oral Daily    gabapentin  300 mg Oral TID    galantamine  16 mg Oral BID WM    lidocaine HCL 20 mg/ml (2%)  10 mL Other Once    [START ON 12/29/2017] lisinopril  10 mg Oral Daily    memantine  5 mg Oral BID    OLANZapine  7.5 mg Oral Nightly    travoprost  1 drop Both Eyes Nightly     PRN Meds:diazePAM, sodium chloride 0.9%    Review of Systems   Constitutional: Positive for fever, chills and weight loss.   Gastrointestinal: Positive for nausea.   Musculoskeletal: Positive for arthralgias.   Skin: Positive for rash. Negative for itching.   Hematologic/Lymphatic: Negative for adenopathy.     Objective:     Vital Signs (Most Recent):  Temp: 99.2 °F (37.3 °C) (12/28/17 1538)  Pulse: (!) 123 (12/28/17 1538)  Resp: 18 (12/28/17 1538)  BP: 108/68 (12/28/17 1538)  SpO2: 98 % (12/28/17 1538) Vital Signs (24h Range):  Temp:  [99.2 °F (37.3 °C)-99.4 °F (37.4 °C)] 99.2 °F (37.3 °C)  Pulse:  [112-123] 123  Resp:  [18] 18  SpO2:  [98 %]  98 %  BP: (108-128)/(68-72) 108/68        There is no height or weight on file to calculate BMI.    Physical Exam   Constitutional: He appears well-developed.   Neurological: He is alert.   Psychiatric: He has a normal mood and affect.   Skin:   Areas Examined (abnormalities noted in diagram):   Scalp / Hair Palpated and Inspected  Head / Face Inspection Performed  Neck Inspection Performed  Chest / Axilla Inspection Performed  Abdomen Inspection Performed  Genitals / Buttocks / Groin Inspection Performed  Back Inspection Performed  RUE Inspected  LUE Inspection Performed  RLE Inspected  LLE Inspection Performed                                      Significant Labs: All pertinent labs within the past 24 hours have been reviewed.       Assessment/Plan:     Neoplasm of uncertain behavior of skin    Prior Skin biopsy with Cd30+ lymphoproliferative disorder in Nov 2017.  DDX still includes LYP Type C vs systemic or primary cutaneous Anaplastic Large Cell lymphoma. Patient currently undergoing restaging with bone marrow biopsy and imaging   -Skin biopsy today to aid in evaluation/restaging  -Procedure note below   -Keep areas covered for 24 hrs then may keep area  moist with Aquaphor and covered  -Heme-Onc and following, appreciate recs    Shave biopsy performed after verbal consent including risk of infection, scar, recurrence, need for additional treatment of site. Area prepped with alcohol, anesthetized with approximately 1.0 cc of 1% lidocaine with epinephrine. Lesional tissue shaved with razor blade. Hemostasis achieved with application of aluminum chloride. No complications. Dressing applied. Wound care explained.            Thank you for your consult. I will follow-up with patient. Please contact us if you have any additional questions.    Grecia Morrow MD  Dermatology  [facility]

## 2017-12-29 NOTE — H&P
[facility]  Hematology  Bone Marrow Transplant  H&P    Subjective:     Principal Problem: Neoplasm of uncertain behavior of skin    HPI: Pt admitted from bmt clinic after seeing Dr. Idris Bernabe for expedited work up. Follows with Dr. Patricia in [place], [place]. Hx of multiple myeloma with auto SCT 3 years ago. Now with possible concerns for lymphoma (cutaneous?). Denies SOB, chest pain, swelling. With weight loss and fatigue, as well as fevers/chills. With numerous vesicular lesions on trunk, arms, head, above groin (started about 1.5 months ago). Previously had skin bx 11/2017. Reported to show CD30+ lymphoproliferative disorder. Will need bm bx, repeat skin bx, skeletal survey, and to start prednisone 1 mg/kg/day after biopsies are performed. States he has been off revlimid for 3-4 weeks due to thrombocytopenia. See oncologic hx    Patient information was obtained from patient, spouse/SO and past medical records.     Oncology History: Per last clinic note by Dr. Patricia:  Myeloma History: The patient has been followed for many years with a long history of plasmacytomas. He was originally noted to have an isolated plasmacytoma of the thoracic spine in 2000. In August of 2010 he developed low back pain and was found to have diffuse marrow signal abnormalities with an L5 compression fracture on MRI. A previous metastatic bone survey in January 2010 did not show abnormalities. He has a bone marrow evaluation that did not show significant plasma cells. He underwent kyphoplasty at L5 and was followed. Another metastatic series in 9/2011 showed a lytic lesion in the left humerus, lucent changes in the calvarium and a questionable lytic lesion at the right inferiour pubic ramus. These were all stable from the examination of 8/2010. Another MRI scan 9/2011 again showed loss of normal marrow fatty signal and some degenerative changes but no other clear bony disease.      8/2013 MR showed multiple lytic lesions  throughout the spine, ribs and other bones with significant expansion of the C2 vertebral body with some soft tissue anterior and posterior with some cord effacement. Small nodes were appreciated in the left supraclavicular region, largest 2x1.8 cm. Another MRI of the cervical/thoracic spine 9/2013 again showed multifocal osseous lesions throughout the cervical/thoracic spine with disc bulging at C3-4 andf C4-5. Metastatic series 9/2013 showed progressive lytic lesions of the cervical spine, new lytic lesions of the right humerus and bilateral ribs and lucent lesions of the scapulae bilaterally. Stable lucensies of the left humerus, right inferior pubic ramus and calvarium were seen again.      Free kappa light chain 9/3/13 elevated at 2577 with lambda of 11.83 with ratio of 217. IgG, A and M were in the normal range at 1136, 157 and 60 respectively. SPEP showed a normal pattern without M-spike. Bone marrow done 9/6/13 showed a 4% monoclonal plasma cell population in aspirate but no significant plasma cells on biopsy with normal cytogenetics. FISH for myeloma associated changes was negative. 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 He was to undergo PET scanning to follow his disease and begin therapy with Dr. Araiza. Unfortunately, he presented on 11/15 with cervical fracture associated with some spinal cord trauma and lower extremity weakness. He was admitted to [facility] and underwent cervical stabilization and continues in a cervical collar. He received cycles of bortezomib/dexamethasone prior to transplant.      04/14 PET/CT prior to transplant showed ongoing lytic lesions but no significant SUV except at the cervical regions which likely reflect remodeling/fracture this was the same 10/2014 (day 100). He has been on maintenance lenalidomide since that time. He is walking with a cane now and still exepriences some  cervical pain when he moves his head in certain directions. He had not obtained local neurosurgical follow up and we will need to arrange this here. His swallowing is less of an issue and he uses thickener with liquids; still with choking about twice a day. He continues to suffer with some neuropathy at his legs and feet as well as some at his upper upper extremities but this has improved over the past 3 months. Neuropathy pain still requires PRN narcotics.   05/14 metastatic series: Marrow lucencies noted involving the cervical spine, lumbar spine, and pelvis, which may represent multiple myeloma deposits.        07/24/15 one year restaging marrow: 40% cellularity with trilineage hematopoiesis and no morphologic evidence of plasma cells.  Cytogenetics could not be done as the metaphase cells did not grow.   06/16 PET CT did not reveal any evidence of myeloma.    Prescriptions Prior to Admission   Medication Sig Dispense Refill Last Dose    amitriptyline (ELAVIL) 50 MG tablet Take 50 mg by mouth every evening.   12/27/2017 at Unknown time    aspirin (ECOTRIN) 81 MG EC tablet Take 81 mg by mouth every morning.    12/28/2017 at Unknown time    carvedilol (COREG) 3.125 MG tablet Take 6.25 mg by mouth 2 (two) times daily.   12/28/2017 at Unknown time    cholecalciferol, vitamin D3, (VITAMIN D3) 2,000 unit Cap Take 1 capsule by mouth after lunch.    12/28/2017 at Unknown time    dexamethasone (DECADRON) 4 MG Tab Take 1 tablet (4 mg total) by mouth once a week. 30 tablet 6 Past Week at Unknown time    diazePAM (VALIUM) 5 MG tablet Take 1 tablet (5 mg total) by mouth every 6 (six) hours as needed for Anxiety. 60 tablet 0 Past Week at Unknown time    finasteride (PROSCAR) 5 mg tablet Take 5 mg by mouth after lunch.    12/28/2017 at Unknown time    furosemide (LASIX) 20 MG tablet Take 1 tablet (20 mg total) by mouth once daily. 30 tablet 3 Past Month at Unknown time    gabapentin (NEURONTIN) 300 MG capsule Take 300  mg by mouth 3 (three) times daily.   12/28/2017 at Unknown time    galantamine (RAZADYNE) 8 MG Tab Take 16 mg by mouth 2 (two) times daily with meals.    12/28/2017 at Unknown time    hydrocodone-acetaminophen 7.5-325mg (NORCO) 7.5-325 mg per tablet Take  2 tabs every 6 hours as needed for pain 60 tablet 0 12/28/2017 at Unknown time    memantine (NAMENDA) 5 MG Tab Take 5 mg by mouth 2 (two) times daily.   12/28/2017 at Unknown time    MULTIVIT &MINERALS/FERROUS FUM (MULTI VITAMIN ORAL) Take by mouth.   12/28/2017 at Unknown time    naproxen (EC NAPROSYN) 500 MG EC tablet   2 Past Week at Unknown time    olanzapine (ZYPREXA) 15 MG Tab Take 7.5 mg by mouth nightly.   12/27/2017 at Unknown time    ranitidine (ZANTAC) 150 MG capsule Take 150 mg by mouth 2 (two) times daily.   12/28/2017 at Unknown time    TRAVATAN Z 0.004 % Drop Place 1 drop into both eyes nightly.  4 12/27/2017 at Unknown time    venlafaxine 150 mg TR24 Take 150 mg by mouth once daily.   12/28/2017 at Unknown time    vitamin E 1000 UNIT capsule Take 1,000 Units by mouth after lunch.    12/28/2017 at Unknown time    budesonide-formoterol 160-4.5 mcg (SYMBICORT) 160-4.5 mcg/actuation HFAA Inhale 2 puffs into the lungs every 12 (twelve) hours. Controller   Taking    lisinopril (PRINIVIL,ZESTRIL) 5 MG tablet Take 10 mg by mouth once daily.    Taking    potassium chloride (KLOR-CON) 10 MEQ TbSR Take 1 tablet (10 mEq total) by mouth once daily. 30 tablet 3 Unknown at Unknown time    potassium chloride SA (K-DUR,KLOR-CON) 10 MEQ tablet Take 10 mEq by mouth once daily.  3 Unknown at Unknown time    REVLIMID 10 mg Cap Take 1 capsule (10mg) by mouth daily for 21 days, then rest for 7 days. 21 each 0 Not Taking       Keflex [cephalexin]     Past Medical History:   Diagnosis Date    Cancer     CHF (congestive heart failure)     Depression     GERD (gastroesophageal reflux disease)     High cholesterol     Hypertension     Left ventricular  ejection fraction less than 40% 8/23/2017    Multiple myeloma     Renal disorder     Stroke     no sequelae     Past Surgical History:   Procedure Laterality Date    BACK SURGERY  1/2000    metal julieth at T-4    CHOLECYSTECTOMY      HEMORRHOID SURGERY      KYPHOSIS SURGERY  2010    PROSTATE SURGERY      SPINE SURGERY      TONSILLECTOMY       Family History     Problem Relation (Age of Onset)    Heart disease Mother    Hypertension Mother    Kidney disease Mother    Scoliosis Daughter    Stroke Father        Social History Main Topics    Smoking status: Former Smoker     Packs/day: 1.00     Years: 40.00     Quit date: 11/15/2008    Smokeless tobacco: Never Used    Alcohol use No      Comment: rare    Drug use: No    Sexual activity: Yes     Partners: Female       Review of Systems   Unable to perform ROS: Dementia   Information given by spouse to best of her knowledge   Used chart review    Objective:     Vital Signs (Most Recent):  Temp: 99.2 °F (37.3 °C) (12/28/17 1538)  Pulse: (!) 123 (12/28/17 1538)  Resp: 18 (12/28/17 1538)  BP: 108/68 (12/28/17 1538)  SpO2: 98 % (12/28/17 1538) Vital Signs (24h Range):  Temp:  [99.2 °F (37.3 °C)-99.4 °F (37.4 °C)] 99.2 °F (37.3 °C)  Pulse:  [112-123] 123  Resp:  [18] 18  SpO2:  [98 %] 98 %  BP: (108-128)/(68-72) 108/68        There is no height or weight on file to calculate BMI.  There is no height or weight on file to calculate BSA.    ECOG SCORE         [unfilled]    Lines/Drains/Airways     Central Venous Catheter Line                 Port A Cath Single Lumen left subclavian -- days                Physical Exam   Constitutional: He is oriented to person, place, and time. He appears well-nourished. He appears lethargic. He is cooperative.   HENT:   Head: Normocephalic and atraumatic.   Eyes: Lids are normal.   Neck: Trachea normal and normal range of motion. Carotid bruit is not present.   Cardiovascular: Regular rhythm and normal heart sounds.  Tachycardia  present.    -120s  No distress noted   Pulmonary/Chest: Effort normal and breath sounds normal.   Abdominal: Normal appearance and bowel sounds are normal.   Musculoskeletal: Normal range of motion.   BLE weakness, unable to ambulate without total assit   Lymphadenopathy:   None palpable    Neurological: He is oriented to person, place, and time. He appears lethargic. Abnormal gait: Unable to ambulate without total assist.   Skin: Skin is dry. He is not diaphoretic. No pallor.        Numerous vesicular type lesions, derm to perform skin bx. See graphical documentation for some areas where lesions were found. Derm following   Psychiatric: He has a normal mood and affect. His speech is normal and behavior is normal. Judgment and thought content normal. Delayed: slow to respond. Cognition and memory are impaired. Depressed: somewhat flat affect.       Significant Labs:   Recent Lab Results       12/28/17  1531 12/28/17  1136      Immature Granulocytes  0.6(H)     Immature Grans (Abs)  0.03     Bone Marrow Griffith Stain Comment See Anatomic Pathology bone marrow report.      Albumin  2.4(L)     Alkaline Phosphatase  158(H)     ALT  16     Anion Gap  10     Aniso  Slight     AST  25     Baso #  0.01     Basophil%  0.2     Beta-2 Microglobulin  3.9(H)     Total Bilirubin  0.4  Comment:  For infants and newborns, interpretation of results should be based  on gestational age, weight and in agreement with clinical  observations.  Premature Infant recommended reference ranges:  Up to 24 hours.............<8.0 mg/dL  Up to 48 hours............<12.0 mg/dL  3-5 days..................<15.0 mg/dL  6-29 days.................<15.0 mg/dL       Body Site - Bone Marrow LPI      BUN, Bld  19     Calcium  9.3     Chloride  102     Clinical Diagnosis - Bone Marrow LYMPH      CO2  25     Creatinine  1.1     Differential Method  Automated     eGFR if African American  >60.0     eGFR if non   >60.0  Comment:  Calculation  used to obtain the estimated glomerular filtration  rate (eGFR) is the CKD-EPI equation.        Eos #  0.0     Eosinophil%  0.0     Glucose  113(H)     Gran #  2.9     Gran%  54.8     Hematocrit  27.3(L)     Hemoglobin  8.8(L)     IgA  237  Comment:  IgA Cord Blood Reference Range: <5 mg/dL.     IgG - Serum  1227  Comment:  IgG Cord Blood Reference Range: 650-1600 mg/dL.     IgM  37  Comment:  IgM Cord Blood Reference Range: <25 mg/dL.(L)     LD  890  Comment:  Results are increased in hemolyzed samples.(H)     Lymph #  1.5     Lymph%  29.2     MCH  31.9(H)     MCHC  32.2     MCV  99(H)     Mono #  0.8     Mono%  15.2(H)     MPV  10.1     nRBC  0     Platelet Estimate  Decreased(A)     Platelets  29  Comment:  PLATELET COUNT critical result(s) called and verbal readback obtained   from LINDA JACOME RN, 12/28/2017 12:29  (LL)     Potassium  3.9     Total Protein  7.9     Protein, Serum  7.0     RBC  2.76(L)     RDW  16.2(H)     Reason for Referral lymphoma      Sodium  137     WBC  5.20        and All pertinent labs from the last 24 hours have been reviewed.    Diagnostic Results:  I have reviewed all pertinent imaging results/findings within the past 24 hours.   Skin bx 11/2017: CD30+ lymphoproliferative disorder (outside facility, unsure where, no records)  Skeletal survey ordered 12/28/17, to be performed tonight  Skin bx 12/28/17, performed and pending results  BM bx 12/28/17, completed and pending results    Assessment/Plan:     * Neoplasm of uncertain behavior of skin    - with multiple lesions as demonstrated in graphical documentation on physical exam, first seen about 1.5 months ago  - previously bx'd at outside hospital 11/2017, unsure which facility per family, no records. Was reported by Dr. Patricia to be CD30+ lymphoproliferative disorder   - derm consulted for additional skin bx, appreciate bx and recs  - pending results from skin bx on 12/28/17        T-cell lymphoma    - possible dx due to previous  skin bx from 11/2017 reported to Dr. Patricia as CD30+ lymphoproliferative disorder  - repeat skin bx done by derm 12/28/17, pending results  - BM bx performed 12/28/17 with flow, cyto, dna/rna hold, and t cell gene rearrangement, pending results  - skeletal survey ordered 12/28/17, pending exam  - to start on prednisone 1 mg/kg/day 12/29/17 (100 mg daily)        History of peripheral stem cell transplant    - had silva auto SCT 6/25/14 for myeloma  - 1 year restaging marrow on 7/24/15 showed 40% cellularity with trilineage hematopoiesis and no morphologic evidence of plasma cells.  Cytogenetics could not be done as the metaphase cells did not grow.   - PET CT did not reveal any evidence of myeloma at 1 year        Multiple myeloma in remission    See oncologic hx on H&P for more information  - had silva auto 6/25/14   - was in CR at 1 year  - hx of plasmacytomas prior to  - maintenance Revlimid on hold for 3-4 weeks due to thrombocytopenia. Will continue to hold at this time  - now with BLE weakness, inability to walk--PT/OT ordered  - BM bx performed 12/28/17, pending results        Cytopenia    Anemia and thrombocytopenia possibly 2/2 disease, relapse, or revlimid  - Revlimid has been on hold for 3-4 weeks due to thrombocytopenia  - Transfuse for hgb <7, plt <10K        Glaucoma    Per spouse  - continue home eye drops        Neuropathy    - continue home gabapentin and norco  - unable to walk without total assist  - PT/OT         Dysphagia    Chronic  - He has ongoing swallowing problems  - He has refused a G-tube on multiple occasions in the past per chart  - SLP to evaluate and treat        Cardiomyopathy    - daily weights  - lasix 20 mg daily, will replace K if needed on a daily basis  - hx of decreased EF 30-40%        Hypertension    - continue home coreg and lisinopril         BPH (benign prostatic hyperplasia)    - continue home finasteride         Memory impairment    Per spouse, pt with demenia  - continue  home memantine and galantamine        Depression    - continue home amitriptyline, olanzapine            VTE Risk Mitigation         Ordered     High Risk of VTE  Once      12/28/17 1534     Place sequential compression device  Until discontinued      12/28/17 1534     Place DARIUS hose  Until discontinued      12/28/17 1534          Disposition: Admitted to inpatient BMT service. Pending bx results and confirmation of dx    Ivonne Curtis NP  Bone Marrow Transplant  Hematology  [facility]

## 2017-12-30 LAB
ALBUMIN SERPL BCP-MCNC: 2.3 G/DL
ALP SERPL-CCNC: 128 U/L
ALT SERPL W/O P-5'-P-CCNC: 12 U/L
ANION GAP SERPL CALC-SCNC: 10 MMOL/L
AST SERPL-CCNC: 15 U/L
BASOPHILS # BLD AUTO: 0 K/UL
BASOPHILS NFR BLD: 0 %
BILIRUB SERPL-MCNC: 0.3 MG/DL
BUN SERPL-MCNC: 20 MG/DL
CALCIUM SERPL-MCNC: 9.5 MG/DL
CHLORIDE SERPL-SCNC: 102 MMOL/L
CO2 SERPL-SCNC: 27 MMOL/L
CREAT SERPL-MCNC: 1 MG/DL
DIFFERENTIAL METHOD: ABNORMAL
EOSINOPHIL # BLD AUTO: 0 K/UL
EOSINOPHIL NFR BLD: 0 %
ERYTHROCYTE [DISTWIDTH] IN BLOOD BY AUTOMATED COUNT: 15.9 %
EST. GFR  (AFRICAN AMERICAN): >60 ML/MIN/1.73 M^2
EST. GFR  (NON AFRICAN AMERICAN): >60 ML/MIN/1.73 M^2
GLUCOSE SERPL-MCNC: 119 MG/DL
HCT VFR BLD AUTO: 24.2 %
HGB BLD-MCNC: 8 G/DL
IMM GRANULOCYTES # BLD AUTO: 0.02 K/UL
IMM GRANULOCYTES NFR BLD AUTO: 0.5 %
LYMPHOCYTES # BLD AUTO: 1 K/UL
LYMPHOCYTES NFR BLD: 22.8 %
MAGNESIUM SERPL-MCNC: 2 MG/DL
MCH RBC QN AUTO: 31.7 PG
MCHC RBC AUTO-ENTMCNC: 33.1 G/DL
MCV RBC AUTO: 96 FL
MONOCYTES # BLD AUTO: 0.7 K/UL
MONOCYTES NFR BLD: 15.3 %
NEUTROPHILS # BLD AUTO: 2.7 K/UL
NEUTROPHILS NFR BLD: 61.4 %
NRBC BLD-RTO: 1 /100 WBC
PHOSPHATE SERPL-MCNC: 2.8 MG/DL
PLATELET # BLD AUTO: 28 K/UL
PLATELET BLD QL SMEAR: ABNORMAL
PMV BLD AUTO: 12.2 FL
POTASSIUM SERPL-SCNC: 4.3 MMOL/L
PROT SERPL-MCNC: 7.3 G/DL
RBC # BLD AUTO: 2.52 M/UL
SODIUM SERPL-SCNC: 139 MMOL/L
URATE SERPL-MCNC: 5.4 MG/DL
WBC # BLD AUTO: 4.43 K/UL

## 2017-12-30 PROCEDURE — 97162 PT EVAL MOD COMPLEX 30 MIN: CPT

## 2017-12-30 PROCEDURE — 84100 ASSAY OF PHOSPHORUS: CPT

## 2017-12-30 PROCEDURE — 84550 ASSAY OF BLOOD/URIC ACID: CPT

## 2017-12-30 PROCEDURE — 99233 SBSQ HOSP IP/OBS HIGH 50: CPT | Mod: ,,, | Performed by: INTERNAL MEDICINE

## 2017-12-30 PROCEDURE — 97530 THERAPEUTIC ACTIVITIES: CPT

## 2017-12-30 PROCEDURE — 97166 OT EVAL MOD COMPLEX 45 MIN: CPT

## 2017-12-30 PROCEDURE — 80053 COMPREHEN METABOLIC PANEL: CPT

## 2017-12-30 PROCEDURE — 36415 COLL VENOUS BLD VENIPUNCTURE: CPT

## 2017-12-30 PROCEDURE — 63600175 PHARM REV CODE 636 W HCPCS: Performed by: INTERNAL MEDICINE

## 2017-12-30 PROCEDURE — 85025 COMPLETE CBC W/AUTO DIFF WBC: CPT

## 2017-12-30 PROCEDURE — 25000003 PHARM REV CODE 250: Performed by: NURSE PRACTITIONER

## 2017-12-30 PROCEDURE — 83735 ASSAY OF MAGNESIUM: CPT

## 2017-12-30 PROCEDURE — 20600001 HC STEP DOWN PRIVATE ROOM

## 2017-12-30 RX ADMIN — FUROSEMIDE 20 MG: 20 TABLET ORAL at 09:12

## 2017-12-30 RX ADMIN — CARVEDILOL 6.25 MG: 6.25 TABLET, FILM COATED ORAL at 09:12

## 2017-12-30 RX ADMIN — GABAPENTIN 300 MG: 300 CAPSULE ORAL at 08:12

## 2017-12-30 RX ADMIN — TRAVOPROST 1 DROP: 0.04 SOLUTION/ DROPS OPHTHALMIC at 08:12

## 2017-12-30 RX ADMIN — HYDROCODONE BITARTRATE AND ACETAMINOPHEN 2 TABLET: 7.5; 325 TABLET ORAL at 08:12

## 2017-12-30 RX ADMIN — OLANZAPINE 7.5 MG: 5 TABLET, FILM COATED ORAL at 08:12

## 2017-12-30 RX ADMIN — FLUTICASONE FUROATE AND VILANTEROL TRIFENATATE 1 PUFF: 100; 25 POWDER RESPIRATORY (INHALATION) at 09:12

## 2017-12-30 RX ADMIN — GALANTAMINE HYDROBROMIDE 16 MG: 4 TABLET, FILM COATED ORAL at 05:12

## 2017-12-30 RX ADMIN — LISINOPRIL 10 MG: 10 TABLET ORAL at 09:12

## 2017-12-30 RX ADMIN — MEMANTINE HYDROCHLORIDE 5 MG: 5 TABLET ORAL at 09:12

## 2017-12-30 RX ADMIN — GABAPENTIN 300 MG: 300 CAPSULE ORAL at 01:12

## 2017-12-30 RX ADMIN — FAMOTIDINE 20 MG: 20 TABLET, FILM COATED ORAL at 09:12

## 2017-12-30 RX ADMIN — CARVEDILOL 6.25 MG: 6.25 TABLET, FILM COATED ORAL at 08:12

## 2017-12-30 RX ADMIN — FINASTERIDE 5 MG: 5 TABLET, FILM COATED ORAL at 01:12

## 2017-12-30 RX ADMIN — GABAPENTIN 300 MG: 300 CAPSULE ORAL at 06:12

## 2017-12-30 RX ADMIN — GALANTAMINE HYDROBROMIDE 16 MG: 4 TABLET, FILM COATED ORAL at 06:12

## 2017-12-30 RX ADMIN — MEMANTINE HYDROCHLORIDE 5 MG: 5 TABLET ORAL at 08:12

## 2017-12-30 RX ADMIN — PREDNISONE 100 MG: 20 TABLET ORAL at 09:12

## 2017-12-30 RX ADMIN — FAMOTIDINE 20 MG: 20 TABLET, FILM COATED ORAL at 08:12

## 2017-12-30 RX ADMIN — AMITRIPTYLINE HYDROCHLORIDE 50 MG: 50 TABLET, FILM COATED ORAL at 08:12

## 2017-12-30 NOTE — SUBJECTIVE & OBJECTIVE
Subjective:     Interval History:   - no acute issues overnight. Awaiting results of skin lesion biopsy and bone marrow biopsy.  - he complains of mild cough. Otherwise, he denies shortness of breath, chest pain, nausea, vomiting, diarrhea, constipation.    Objective:     Vital Signs (Most Recent):  Temp: 98.1 °F (36.7 °C) (12/30/17 1555)  Pulse: 108 (12/30/17 1555)  Resp: 18 (12/30/17 1555)  BP: 122/73 (12/30/17 1555)  SpO2: 98 % (12/30/17 1555) Vital Signs (24h Range):  Temp:  [97.6 °F (36.4 °C)-98.1 °F (36.7 °C)] 98.1 °F (36.7 °C)  Pulse:  [] 108  Resp:  [16-18] 18  SpO2:  [96 %-100 %] 98 %  BP: (109-128)/(64-80) 122/73     Weight: 98 kg (216 lb 0.8 oz)  Body mass index is 28.5 kg/m².  Body surface area is 2.25 meters squared.    Intake/Output - Last 3 Shifts       12/28 0700 - 12/29 0659 12/29 0700 - 12/30 0659 12/30 0700 - 12/31 0659    P.O.  1540 1200    Total Intake(mL/kg)  1540 (15.7) 1200 (12.2)    Urine (mL/kg/hr) 400 1250 (0.5)     Total Output 400 1250      Net -400 +290 +1200           Urine Occurrence 2 x 3 x 4 x    Stool Occurrence  0 x 1 x    Emesis Occurrence  0 x 0 x          Physical Exam   Constitutional: He is oriented to person, place, and time. He appears well-nourished. He appears lethargic. He is cooperative.   HENT:   Head: Normocephalic and atraumatic.   Eyes: Lids are normal.   Neck: Trachea normal and normal range of motion. Carotid bruit is not present.   Cardiovascular: Regular rhythm and normal heart sounds.  Tachycardia present.    Pulmonary/Chest: Effort normal and breath sounds normal.   Abdominal: Normal appearance and bowel sounds are normal.   Musculoskeletal: Normal range of motion.   BLE weakness, unable to ambulate without total assit   Lymphadenopathy:   None palpable    Neurological: He is oriented to person, place, and time. He appears lethargic. Abnormal gait: Unable to ambulate without total assist.   Skin: Skin is dry. He is not diaphoretic. No pallor.         Numerous vesicular type lesions, derm to perform skin bx. See graphical documentation for some areas where lesions were found. Derm following   Psychiatric: He has a normal mood and affect. His speech is normal and behavior is normal. Judgment and thought content normal. Delayed: slow to respond. Cognition and memory are impaired. Depressed: somewhat flat affect.     Significant Labs:   Labs have been reviewed.

## 2017-12-30 NOTE — PLAN OF CARE
Problem: Physical Therapy Goal  Goal: Physical Therapy Goal  Goals to be met by: 18    Patient will increase functional independence with mobility by performin. Supine to sit with Stand-by Assistance  2. Sit to supine with Stand-by Assistance  3. Sit to stand transfer with Supervision  4. Bed to chair transfer with Supervision using Rolling Walker  5. Gait  x 150 feet with Supervision using Rolling Walker.   6. Ascend/descend 5 stair with left Handrails Contact Guard Assistance using Rolling Walker.     Outcome: Ongoing (interventions implemented as appropriate)  Evaluation complete.  Goals established to improve functional mobility.    DC rec's for PT    Phillip Reynoso III, ROSALBAT, PT  2017

## 2017-12-30 NOTE — PT/OT/SLP EVAL
Occupational Therapy   Evaluation    Name: Lane Nunez Jr.  MRN: [id number]  Admitting Diagnosis:  Neoplasm of uncertain behavior of skin      Recommendations:     Discharge Recommendations:    Discharge Equipment Recommendations:     Barriers to discharge:       History:     Occupational Profile:  Living Environment: Pt lives with wife in one story house with 5 steps to enter with L deidre  Previous level of function: Pt with decreasing tolerance of self care performance in recent month due to illness per wife  Equipment Owned:    shower chair, walker, quad cane  Assistance upon Discharge: Pt to continue with assist from spouse for safe completion of self care    Past Medical History:   Diagnosis Date    Cancer     CHF (congestive heart failure)     Depression     GERD (gastroesophageal reflux disease)     High cholesterol     Hypertension     Left ventricular ejection fraction less than 40% 8/23/2017    Multiple myeloma     Renal disorder     Stroke     no sequelae       Past Surgical History:   Procedure Laterality Date    BACK SURGERY  1/2000    metal julieth at T-4    CHOLECYSTECTOMY      HEMORRHOID SURGERY      KYPHOSIS SURGERY  2010    PROSTATE SURGERY      SPINE SURGERY      TONSILLECTOMY         Subjective     Chief Complaint: weakness  Patient/Family stated goals:Return to home  Communicated with: RN prior to session.  Pain/Comfort:  ·      Objective:     Patient found with:      General Precautions: Standard,     Orthopedic Precautions:    Braces:       Occupational Performance:    Bed Mobility:    · Patient completed Rolling/Turning to Right with minimum assistance  · Patient completed Scooting/Bridging with minimum assistance  · Patient completed Supine to Sit with moderate assistance    Functional Mobility/Transfers:  · Patient completed Sit <> Stand Transfer with minimum assistance  with  4 wheeled walker   · Patient completed Bed <> Chair Transfer using Stand Pivot technique with contact  "guard assistance with 4 wheeled walker    Activities of Daily Living:  · Feeding:  contact guard assistance    · Grooming: minimum assistance    · UB Dressing: minimum assistance    · LB Dressing: moderate assistance      Cognitive/Visual Perceptual:  Cognitive/Psychosocial Skills:     -       Oriented to: Person, Place, Time and Situation   -       Follows Commands/attention:Follows two-step commands  -       Communication: clear/fluent  -       Memory: No Deficits noted  -       Safety awareness/insight to disability: intact   -       Mood/Affect/Coping skills/emotional control: Appropriate to situation    Physical Exam:  Postural examination/scapula alignment:    -       Rounded shoulders  -       Forward head  Skin integrity: Visible skin intact  Edema:  Moderate B feet  Sensation:    -       Intact  Upper Extremity Range of Motion:     -       Right Upper Extremity: decreased ROM in B shoulder flexion  -       Left Upper Extremity: see above  Upper Extremity Strength:    -       Right Upper Extremity: WFL  -       Left Upper Extremity: WFL    Patient left up in chair with all lines intact and call button in reach    [facility] 6 Click:  [facility] Total Score: 13    Treatment & Education:  Pt and spouse educated on safety, importance of daily participation in self care for gains, caregiver strain.  Bed mobility and lower extremity dressing   Education:    Assessment:     Lane Nunez JrArthur is a 67 y.o. male with a medical diagnosis of Neoplasm of uncertain behavior of skin.  He presents with poor tolerance for activity and decreased endurance. .  Performance deficits affecting function are weakness, decreased slef care, decreased endurance  .      Rehab Prognosis:  good; patient would benefit from acute skilled OT services to address these deficits and reach maximum level of function.         Clinical Decision Makin.  OT Mod:  "Pt evaluation falls under moderate complexity for evaluation coding due to " "identification of 3-5 performance deficits noted as stated above. Eval required Min/Mod assistance to complete on this date and detailed assessment(s) were utilized. Moreover, an expanded review of history and occupational profile obtained with additional review of cognitive, physical and psychosocial hx."     Plan:     Patient to be seen   to address the above listed problems via self-care/home management, therapeutic activities, therapeutic exercises  · Plan of Care Expires:    · Plan of Care Reviewed with:      This Plan of care has been discussed with the patient who was involved in its development and understands and is in agreement with the identified goals and treatment plan    GOALS:    Occupational Therapy Goals        Problem: Occupational Therapy Goal    Goal Priority Disciplines Outcome Interventions   Occupational Therapy Goal     OT, PT/OT Ongoing (interventions implemented as appropriate)    Description:  Goals to be met by: 1/17/18     Patient will increase functional independence with ADLs by performing:    UE Dressing with Stand-by Assistance.  LE Dressing with Contact Guard Assistance.  Grooming while seated with Stand-by Assistance.  Toileting from toilet with Contact Guard Assistance for hygiene and clothing management.                       Time Tracking:     OT Date of Treatment: 12/30/17  OT Start Time: 0855  OT Stop Time: 0915  OT Total Time (min): 20 min    Billable Minutes:Evaluation 20    Tigist Newby, OT  12/30/2017    "

## 2017-12-30 NOTE — PLAN OF CARE
Problem: Patient Care Overview  Goal: Plan of Care Review  Patient is AAOx4. Bed low, locked, call bell within reach, non skid socks on. Patient educated to use call bell for assistance, verbalized understanding. Patient ambulates with standby assistance, wife at bedside attentive to patient's needs. Patient remains free from falls or injury so far this shift. Patient working with PT/OT.   Afebrile, WBC 4.43.   Continuing regular diet with nectar thick liquids, patient tolerating well - speech following.   No complaints of pain or nausea.   Bone marrow and skin biopsy results pending.   Patient able to reposition self in bed independently, no skin breakdown noted.   See flow sheet for complete assessment details.

## 2017-12-30 NOTE — ASSESSMENT & PLAN NOTE
- Chronic, secondary to dementia  - He has ongoing swallowing problems  - He has refused a G-tube on multiple occasions in the past per chart  - SLP to evaluate and treat

## 2017-12-30 NOTE — PT/OT/SLP EVAL
Physical Therapy Evaluation    Patient Name:  Lane Nunez Jr.   MRN:  [id number]    Recommendations:     Discharge Recommendations:  home with home health   Discharge Equipment Recommendations: none   Barriers to discharge: Decreased caregiver support     Assessment:     Lane Nunez Jr. is a 67 y.o. male admitted with a medical diagnosis of Neoplasm of uncertain behavior of skin.  He presents with the following impairments/functional limitations:  weakness, impaired endurance, impaired functional mobilty, impaired balance, gait instability. Good tolerance to PT session. Improved gait and stair mechanics with education provided. Needs reinforcement. Significantly limited by fatigue; however, patient able to complete functional tasks/assessment with encouragement. To benefit from continued skilled intervention to address deficits prior to transtion home with HHPT services.    Rehab Prognosis:  Good; patient would benefit from acute skilled PT services to address these deficits and reach maximum level of function.      Recent Surgery: * No surgery found *      Plan:     During this hospitalization, patient to be seen 3 x/week to address the above listed problems via gait training, therapeutic activities, therapeutic exercises, neuromuscular re-education  · Plan of Care Expires:  01/30/18   Plan of Care Reviewed with: patient, spouse    Subjective     Communicated with RN prior to session.  Patient found seated EOB with wife present upon PT entry to room, agreeable to evaluation.      Chief Complaint: fatigue  Patient comments/goals: none stated  Pain/Comfort:  · Pain Rating 1: 0/10  · Pain Rating Post-Intervention 2: 0/10    Patients cultural, spiritual, Confucianism conflicts given the current situation: none stated    Living Environment:  Wife provided history. Patient and wife live in double-wide trailer with 5 TOMMY. First three steps have L handrail. No HR on last two steps to enter home. Regular tub/shower with  seat. Bedside commode placed near bed.  Prior to admission, patients level of function was needing varying levels of assist with ADLs and mobility.  Patient has the following equipment: walker, rolling, rollator, wheelchair, bedside commode, shower chair.  DME owned (not currently used): none.  Upon discharge, patient will have assistance from wife; however, unclear how much physical assist wife will be able to provide upon discharge.    Objective:     Patient found with:  (no lines; wife present)     General Precautions: Standard, aspiration, nectar thick, fall   Orthopedic Precautions:N/A   Braces: N/A     Exams:  · Cognitive Exam:  Patient is oriented to Person, Place, Time and Situation and follows 100% of multi-step commands   · Gross Motor Coordination:  WFL  · Sensation: -       Intact  · Skin Integrity/Edema:  -       Skin integrity: Visible skin intact  · RLE ROM: WFL  · RLE Strength: WFL  · LLE ROM: WFL  · LLE Strength: WFL    Functional Mobility:  · Bed Mobility:     · Transfers:  Sit to Stand:  contact guard assistance with rolling walker  · Gait: 60ftx2 SBA using rolling walker. Improved gait mechanics with second ambulation attempt with education on sequencing and HSTO progression  · Balance: no LOB with ambulation.  · Stairs:  Pt ascended/descended 5 stair(s) with No Assistive Device with left handrail with Minimal Assistance.     AM-PAC 6 CLICK MOBILITY  Total Score:18       Therapeutic Activities and Exercises:  Co-evaluation with OT.     Patient educated on:   - role of PT/POC    - safety with all functional mobility   - transfer training   - gait training with rolling walker   - stair gait training with L handrail and therapist assist   - importance of participation with therapy services   - safest to ambulate with rolling walker and 1 person assist at this time.    Verbalized understanding of all education provided.    Significant fatigue noted post session.  With education patient able to  improve mobility and safety      Patient left up in chair with all lines intact, call button in reach, RN notified and OT present.    GOALS:    Physical Therapy Goals        Problem: Physical Therapy Goal    Goal Priority Disciplines Outcome Goal Variances Interventions   Physical Therapy Goal     PT/OT, PT Ongoing (interventions implemented as appropriate)     Description:  Goals to be met by: 18    Patient will increase functional independence with mobility by performin. Supine to sit with Stand-by Assistance  2. Sit to supine with Stand-by Assistance  3. Sit to stand transfer with Supervision  4. Bed to chair transfer with Supervision using Rolling Walker  5. Gait  x 150 feet with Supervision using Rolling Walker.   6. Ascend/descend 5 stair with left Handrails Contact Guard Assistance using Rolling Walker.                       History:     Past Medical History:   Diagnosis Date    Cancer     CHF (congestive heart failure)     Depression     GERD (gastroesophageal reflux disease)     High cholesterol     Hypertension     Left ventricular ejection fraction less than 40% 2017    Multiple myeloma     Renal disorder     Stroke     no sequelae       Past Surgical History:   Procedure Laterality Date    BACK SURGERY  2000    metal julieth at T-4    CHOLECYSTECTOMY      HEMORRHOID SURGERY      KYPHOSIS SURGERY      PROSTATE SURGERY      SPINE SURGERY      TONSILLECTOMY         Clinical Decision Making:     History  Co-morbidities and personal factors that may impact the plan of care Examination  Body Structures and Functions, activity limitations and participation restrictions that may impact the plan of care Clinical Presentation   Decision Making/ Complexity Score   Co-morbidities:   [] Time since onset of injury / illness / exacerbation  [] Status of current condition  []Patient's cognitive status and safety concerns    [x] Multiple Medical Problems (see med hx)  Personal Factors:    [] Patient's age  [x] Prior Level of function   [] Patient's home situation (environment and family support)  [] Patient's level of motivation  [] Expected progression of patient      HISTORY:(criteria)    [] 28779 - no personal factors/history    [x] 68460 - has 1-2 personal factor/comorbidity     [] 20938 - has >3 personal factor/comorbidity     Body Regions:  [] Objective examination findings  [] Head     []  Neck  [] Trunk   [] Upper Extremity  [] Lower Extremity    Body Systems:  [x] For communication ability, affect, cognition, language, and learning style: the assessment of the ability to make needs known, consciousness, orientation (person, place, and time), expected emotional /behavioral responses, and learning preferences (eg, learning barriers, education  needs)  [x] For the neuromuscular system: a general assessment of gross coordinated movement (eg, balance, gait, locomotion, transfers, and transitions) and motor function  (motor control and motor learning)  [x] For the musculoskeletal system: the assessment of gross symmetry, gross range of motion, gross strength, height, and weight  [] For the integumentary system: the assessment of pliability(texture), presence of scar formation, skin color, and skin integrity  [] For cardiovascular/pulmonary system: the assessment of heart rate, respiratory rate, blood pressure, and edema     Activity limitations:    [] Patient's cognitive status and saf ety concerns          [] Status of current condition      [] Weight bearing restriction  [] Cardiopulmunary Restriction    Participation Restrictions:   [] Goals and goal agreement with the patient     [] Rehab potential (prognosis) and probable outcome      Examination of Body System: (criteria)    [] 76509 - addressing 1-2 elements    [x] 24118 - addressing a total of 3 or more elements     [] 41332 -  Addressing a total of 4 or more elements         Clinical Presentation: (criteria)  Evolving - 46181     On  examination of body system using standardized tests and measures patient presents with 3 or more elements from any of the following: body structures and functions, activity limitations, and/or participation restrictions.  Leading to a clinical presentation that is considered evolving with changing characteristics                              Clinical Decision Making  (Eval Complexity):  Moderate - 45050     Time Tracking:     PT Received On: 12/30/17  PT Start Time: 0855     PT Stop Time: 0915  PT Total Time (min): 20 min     Billable Minutes: Evaluation 12 and Therapeutic Activity 8      Phillip Reynoso III, PT  12/30/2017

## 2017-12-30 NOTE — ASSESSMENT & PLAN NOTE
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 Continue to monitor.

## 2017-12-30 NOTE — NURSING TRANSFER
Nursing Transfer Note      12/30/2017     Transfer From: CT    Transfer via stretcher    Transfer with cardiac monitoring    Transported by transport    Medicines sent: n/a     Chart send with patient: Yes    Notified: spouse    Patient reassessed at: 2130, 12/29/17     Upon arrival to floor: cardiac monitor applied, patient oriented to room, call bell in reach and bed in lowest position

## 2017-12-30 NOTE — PLAN OF CARE
Problem: Occupational Therapy Goal  Goal: Occupational Therapy Goal  Goals to be met by: 1/17/18     Patient will increase functional independence with ADLs by performing:    UE Dressing with Stand-by Assistance.  LE Dressing with Contact Guard Assistance.  Grooming while seated with Stand-by Assistance.  Toileting from toilet with Contact Guard Assistance for hygiene and clothing management.     Outcome: Ongoing (interventions implemented as appropriate)  Evaluation complete and goals set.  Cont with POC  /Tigist Newby OT  12/30/2017

## 2017-12-30 NOTE — PLAN OF CARE
Problem: Patient Care Overview  Goal: Plan of Care Review  Outcome: Ongoing (interventions implemented as appropriate)  Pt. Transferred from CT at 2130 last night. Pt. Rested throughout shift. Assisted to bsc X3; pt passes gas but no BM noted. Pt. Assisted back to bed. Bed alarm in place. NADN, VSS. Safety maintained. Call light within reach. Will continue to monitor.

## 2017-12-30 NOTE — PROGRESS NOTES
[facility]  Hematology  Bone Marrow Transplant  Progress Note    Patient Name: Lane Nunez Jr.  Admission Date: 12/28/2017  Hospital Length of Stay: 2 days  Code Status: Full Code    Subjective:     Interval History:   - no acute issues overnight. Awaiting results of skin lesion biopsy and bone marrow biopsy.  - he complains of mild cough. Otherwise, he denies shortness of breath, chest pain, nausea, vomiting, diarrhea, constipation.    Objective:     Vital Signs (Most Recent):  Temp: 98.1 °F (36.7 °C) (12/30/17 1555)  Pulse: 108 (12/30/17 1555)  Resp: 18 (12/30/17 1555)  BP: 122/73 (12/30/17 1555)  SpO2: 98 % (12/30/17 1555) Vital Signs (24h Range):  Temp:  [97.6 °F (36.4 °C)-98.1 °F (36.7 °C)] 98.1 °F (36.7 °C)  Pulse:  [] 108  Resp:  [16-18] 18  SpO2:  [96 %-100 %] 98 %  BP: (109-128)/(64-80) 122/73     Weight: 98 kg (216 lb 0.8 oz)  Body mass index is 28.5 kg/m².  Body surface area is 2.25 meters squared.    Intake/Output - Last 3 Shifts       12/28 0700 - 12/29 0659 12/29 0700 - 12/30 0659 12/30 0700 - 12/31 0659    P.O.  1540 1200    Total Intake(mL/kg)  1540 (15.7) 1200 (12.2)    Urine (mL/kg/hr) 400 1250 (0.5)     Total Output 400 1250      Net -400 +290 +1200           Urine Occurrence 2 x 3 x 4 x    Stool Occurrence  0 x 1 x    Emesis Occurrence  0 x 0 x          Physical Exam   Constitutional: He is oriented to person, place, and time. He appears well-nourished. He appears lethargic. He is cooperative.   HENT:   Head: Normocephalic and atraumatic.   Eyes: Lids are normal.   Neck: Trachea normal and normal range of motion. Carotid bruit is not present.   Cardiovascular: Regular rhythm and normal heart sounds.  Tachycardia present.    Pulmonary/Chest: Effort normal and breath sounds normal.   Abdominal: Normal appearance and bowel sounds are normal.   Musculoskeletal: Normal range of motion.   BLE weakness, unable to ambulate without total assit   Lymphadenopathy:   None palpable     Neurological: He is oriented to person, place, and time. He appears lethargic. Abnormal gait: Unable to ambulate without total assist.   Skin: Skin is dry. He is not diaphoretic. No pallor.        Numerous vesicular type lesions, derm to perform skin bx. See graphical documentation for some areas where lesions were found. Derm following   Psychiatric: He has a normal mood and affect. His speech is normal and behavior is normal. Judgment and thought content normal. Delayed: slow to respond. Cognition and memory are impaired. Depressed: somewhat flat affect.     Significant Labs:   Labs have been reviewed.    Assessment/Plan:     * Neoplasm of uncertain behavior of skin    - with multiple lesions as demonstrated in graphical documentation on physical exam, first seen about 1.5 months ago  - previously bx'd at outside hospital 11/2017, unsure which facility per family, no records. Was reported by Dr. Patricia to be CD30+ lymphoproliferative disorder   - derm consulted for additional skin bx, appreciate bx and recs  - pending results from skin bx on 12/28/17         T-cell lymphoma    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 Continue to monitor.        History of peripheral stem cell transplant    - had silva auto SCT 6/25/14 for myeloma  - 1 year restaging marrow on 7/24/15 showed 40% cellularity with trilineage hematopoiesis and no morphologic evidence of plasma cells.  Cytogenetics could not be done as the metaphase cells did not grow.   - PET CT did not reveal any evidence of myeloma at 1 year   - skeletal survey  was negative        Multiple myeloma in remission    See oncologic hx on H&P for more information  - had silva auto 6/25/14   - was in CR at 1 year  - hx of plasmacytomas prior to  - maintenance Revlimid on hold for 3-4 weeks due to thrombocytopenia. Will continue to hold at this time  - now with BLE weakness, inability to walk--PT/OT ordered  - BM bx performed 12/28/17, pending results         Cytopenia    Anemia and thrombocytopenia possibly 2/2 disease, relapse, or revlimid  - Revlimid has been on hold for 3-4 weeks due to thrombocytopenia  - hemoglovin is 8 g/dL. Platelet count is 28 k/uL  - Transfuse for hgb <7, plt <10K         Dysphagia    - Chronic, secondary to dementia  - He has ongoing swallowing problems  - He has refused a G-tube on multiple occasions in the past per chart  - SLP to evaluate and treat         Cardiomyopathy    - daily weights  - lasix 20 mg daily, will replace K if needed on a daily basis  - hx of decreased EF 30-40%         Memory impairment    - Per spouse, pt with demenia  - continue home memantine and galantamine         Depression    - no acute issus. Continue home amitriptyline, olanzapine        Hypertension    - blood pressure is within acceptable limits  - continue home coreg and lisinopril          BPH (benign prostatic hyperplasia)    - continue home finasteride          Neuropathy    - continue home gabapentin and norco  - unable to walk without total assist  - PT/OT          Glaucoma    Per spouse  - continue home eye drops              VTE Risk Mitigation         Ordered     High Risk of VTE  Once      12/28/17 1534     Place sequential compression device  Until discontinued      12/28/17 1534     Place DARIUS hose  Until discontinued      12/28/17 1534          Disposition: await results of skin biopsy and bone marrow biopsy. Continue supportive care.    Costa Ram MD  Bone Marrow Transplant  [facility]

## 2017-12-30 NOTE — ASSESSMENT & PLAN NOTE
- had silva auto SCT 6/25/14 for myeloma  - 1 year restaging marrow on 7/24/15 showed 40% cellularity with trilineage hematopoiesis and no morphologic evidence of plasma cells.  Cytogenetics could not be done as the metaphase cells did not grow.   - PET CT did not reveal any evidence of myeloma at 1 year   - skeletal survey was negative

## 2017-12-30 NOTE — ASSESSMENT & PLAN NOTE
Anemia and thrombocytopenia possibly 2/2 disease, relapse, or revlimid  - Revlimid has been on hold for 3-4 weeks due to thrombocytopenia  - hemoglovin is 8 g/dL. Platelet count is 28 k/uL  - Transfuse for hgb <7, plt <10K

## 2017-12-31 LAB
ALBUMIN SERPL BCP-MCNC: 2.3 G/DL
ALP SERPL-CCNC: 124 U/L
ALT SERPL W/O P-5'-P-CCNC: 12 U/L
ANION GAP SERPL CALC-SCNC: 10 MMOL/L
ANISOCYTOSIS BLD QL SMEAR: SLIGHT
AST SERPL-CCNC: 15 U/L
BASOPHILS # BLD AUTO: 0 K/UL
BASOPHILS NFR BLD: 0 %
BILIRUB SERPL-MCNC: 0.2 MG/DL
BODY SITE - BONE MARROW: NORMAL
BUN SERPL-MCNC: 26 MG/DL
CALCIUM SERPL-MCNC: 9 MG/DL
CHLORIDE SERPL-SCNC: 105 MMOL/L
CLINICAL DIAGNOSIS - BONE MARROW: NORMAL
CO2 SERPL-SCNC: 24 MMOL/L
CREAT SERPL-MCNC: 0.9 MG/DL
DIFFERENTIAL METHOD: ABNORMAL
EOSINOPHIL # BLD AUTO: 0 K/UL
EOSINOPHIL NFR BLD: 0 %
ERYTHROCYTE [DISTWIDTH] IN BLOOD BY AUTOMATED COUNT: 15.9 %
EST. GFR  (AFRICAN AMERICAN): >60 ML/MIN/1.73 M^2
EST. GFR  (NON AFRICAN AMERICAN): >60 ML/MIN/1.73 M^2
FLOW CYTOMETRY ANTIBODIES ANALYZED - BONE MARROW: NORMAL
FLOW CYTOMETRY COMMENT - BONE MARROW: NORMAL
FLOW CYTOMETRY INTERPRETATION - BONE MARROW: NORMAL
GLUCOSE SERPL-MCNC: 109 MG/DL
HCT VFR BLD AUTO: 23.5 %
HGB BLD-MCNC: 7.9 G/DL
IMM GRANULOCYTES # BLD AUTO: 0.03 K/UL
IMM GRANULOCYTES NFR BLD AUTO: 0.5 %
LYMPHOCYTES # BLD AUTO: 1 K/UL
LYMPHOCYTES NFR BLD: 17.9 %
MAGNESIUM SERPL-MCNC: 1.9 MG/DL
MCH RBC QN AUTO: 31.9 PG
MCHC RBC AUTO-ENTMCNC: 33.6 G/DL
MCV RBC AUTO: 95 FL
MONOCYTES # BLD AUTO: 0.6 K/UL
MONOCYTES NFR BLD: 11.2 %
NEUTROPHILS # BLD AUTO: 3.8 K/UL
NEUTROPHILS NFR BLD: 70.4 %
NRBC BLD-RTO: 1 /100 WBC
PHOSPHATE SERPL-MCNC: 2.9 MG/DL
PLATELET # BLD AUTO: 28 K/UL
PLATELET BLD QL SMEAR: ABNORMAL
PMV BLD AUTO: 10.8 FL
POIKILOCYTOSIS BLD QL SMEAR: SLIGHT
POTASSIUM SERPL-SCNC: 4 MMOL/L
PROT SERPL-MCNC: 6.9 G/DL
RBC # BLD AUTO: 2.48 M/UL
SODIUM SERPL-SCNC: 139 MMOL/L
WBC # BLD AUTO: 5.46 K/UL

## 2017-12-31 PROCEDURE — 25000003 PHARM REV CODE 250

## 2017-12-31 PROCEDURE — 84100 ASSAY OF PHOSPHORUS: CPT

## 2017-12-31 PROCEDURE — 83735 ASSAY OF MAGNESIUM: CPT

## 2017-12-31 PROCEDURE — 63600175 PHARM REV CODE 636 W HCPCS: Performed by: INTERNAL MEDICINE

## 2017-12-31 PROCEDURE — 36415 COLL VENOUS BLD VENIPUNCTURE: CPT

## 2017-12-31 PROCEDURE — 80053 COMPREHEN METABOLIC PANEL: CPT

## 2017-12-31 PROCEDURE — 25000003 PHARM REV CODE 250: Performed by: INTERNAL MEDICINE

## 2017-12-31 PROCEDURE — 20600001 HC STEP DOWN PRIVATE ROOM

## 2017-12-31 PROCEDURE — 85025 COMPLETE CBC W/AUTO DIFF WBC: CPT

## 2017-12-31 PROCEDURE — 93010 ELECTROCARDIOGRAM REPORT: CPT | Mod: ,,, | Performed by: INTERNAL MEDICINE

## 2017-12-31 PROCEDURE — 93005 ELECTROCARDIOGRAM TRACING: CPT

## 2017-12-31 PROCEDURE — 25000003 PHARM REV CODE 250: Performed by: NURSE PRACTITIONER

## 2017-12-31 PROCEDURE — 99233 SBSQ HOSP IP/OBS HIGH 50: CPT | Mod: ,,, | Performed by: INTERNAL MEDICINE

## 2017-12-31 RX ORDER — OXYCODONE AND ACETAMINOPHEN 10; 325 MG/1; MG/1
TABLET ORAL
Status: COMPLETED
Start: 2017-12-31 | End: 2017-12-31

## 2017-12-31 RX ORDER — CYCLOBENZAPRINE HCL 5 MG
5 TABLET ORAL 3 TIMES DAILY PRN
Status: DISCONTINUED | OUTPATIENT
Start: 2017-12-31 | End: 2018-01-04 | Stop reason: HOSPADM

## 2017-12-31 RX ADMIN — MEMANTINE HYDROCHLORIDE 5 MG: 5 TABLET ORAL at 10:12

## 2017-12-31 RX ADMIN — CARVEDILOL 6.25 MG: 6.25 TABLET, FILM COATED ORAL at 10:12

## 2017-12-31 RX ADMIN — HYDROCODONE BITARTRATE AND ACETAMINOPHEN 2 TABLET: 7.5; 325 TABLET ORAL at 09:12

## 2017-12-31 RX ADMIN — GABAPENTIN 300 MG: 300 CAPSULE ORAL at 01:12

## 2017-12-31 RX ADMIN — LISINOPRIL 10 MG: 10 TABLET ORAL at 09:12

## 2017-12-31 RX ADMIN — PREDNISONE 100 MG: 20 TABLET ORAL at 10:12

## 2017-12-31 RX ADMIN — CARVEDILOL 6.25 MG: 6.25 TABLET, FILM COATED ORAL at 09:12

## 2017-12-31 RX ADMIN — OLANZAPINE 7.5 MG: 5 TABLET, FILM COATED ORAL at 09:12

## 2017-12-31 RX ADMIN — GABAPENTIN 300 MG: 300 CAPSULE ORAL at 09:12

## 2017-12-31 RX ADMIN — GABAPENTIN 300 MG: 300 CAPSULE ORAL at 06:12

## 2017-12-31 RX ADMIN — FUROSEMIDE 20 MG: 20 TABLET ORAL at 10:12

## 2017-12-31 RX ADMIN — HYDROCODONE BITARTRATE AND ACETAMINOPHEN 2 TABLET: 7.5; 325 TABLET ORAL at 10:12

## 2017-12-31 RX ADMIN — FINASTERIDE 5 MG: 5 TABLET, FILM COATED ORAL at 01:12

## 2017-12-31 RX ADMIN — FAMOTIDINE 20 MG: 20 TABLET, FILM COATED ORAL at 10:12

## 2017-12-31 RX ADMIN — OXYCODONE HYDROCHLORIDE AND ACETAMINOPHEN 1 TABLET: 10; 325 TABLET ORAL at 04:12

## 2017-12-31 RX ADMIN — TRAVOPROST 1 DROP: 0.04 SOLUTION/ DROPS OPHTHALMIC at 09:12

## 2017-12-31 RX ADMIN — FAMOTIDINE 20 MG: 20 TABLET, FILM COATED ORAL at 09:12

## 2017-12-31 RX ADMIN — CYCLOBENZAPRINE HYDROCHLORIDE 5 MG: 5 TABLET, FILM COATED ORAL at 04:12

## 2017-12-31 RX ADMIN — MEMANTINE HYDROCHLORIDE 5 MG: 5 TABLET ORAL at 09:12

## 2017-12-31 RX ADMIN — AMITRIPTYLINE HYDROCHLORIDE 50 MG: 50 TABLET, FILM COATED ORAL at 09:12

## 2017-12-31 RX ADMIN — GALANTAMINE HYDROBROMIDE 16 MG: 4 TABLET, FILM COATED ORAL at 04:12

## 2017-12-31 RX ADMIN — GALANTAMINE HYDROBROMIDE 16 MG: 4 TABLET, FILM COATED ORAL at 01:12

## 2017-12-31 NOTE — ASSESSMENT & PLAN NOTE
- possible dx due to previous skin bx from 11/2017 reported to Dr. Patricia as CD30+ lymphoproliferative disorder  - repeat skin bx done by derm 12/28/17, pending results  - BM bx performed 12/28/17 with flow, cyto, dna/rna hold, and t cell gene rearrangement, pending results  - skeletal survey ordered 12/28/17, did not show metastatic disease  - started on prednisone 1 mg/kg/day on 12/29/17 (100 mg daily). Continue for now.   - patient may have a biphenotypic process. Continue to monitor.

## 2017-12-31 NOTE — ASSESSMENT & PLAN NOTE
See oncologic hx on H&P for more information  - had silva auto 6/25/14   - was in CR at 1 year  - hx of plasmacytomas prior to  - maintenance Revlimid on hold for 3-4 weeks due to thrombocytopenia. Will continue to hold at this time  - now with BLE weakness, inability to walk--PT/OT ordered  - BM bx performed 12/28/17, results are pending

## 2017-12-31 NOTE — PROGRESS NOTES
[facility]  Hematology  Bone Marrow Transplant  Progress Note    Patient Name: Lane Nunez Jr.  Admission Date: 12/28/2017  Hospital Length of Stay: 3 days  Code Status: Full Code    Subjective:     Interval History:   - no acute issues overnight. Awaiting results of skin lesion biopsy and bone marrow biopsy.  - his cough has improved slightly. He denies shortness of breath, chest pain, nausea, vomiting, diarrhea, constipation.    Objective:     Vital Signs (Most Recent):  Temp: 98.3 °F (36.8 °C) (12/31/17 1200)  Pulse: 104 (12/31/17 1200)  Resp: 18 (12/31/17 1200)  BP: (!) 140/80 (12/31/17 1200)  SpO2: 98 % (12/31/17 1200) Vital Signs (24h Range):  Temp:  [98 °F (36.7 °C)-98.3 °F (36.8 °C)] 98.3 °F (36.8 °C)  Pulse:  [] 104  Resp:  [16-18] 18  SpO2:  [98 %-100 %] 98 %  BP: (110-157)/(70-80) 140/80     Weight: 100.7 kg (222 lb 0.1 oz)  Body mass index is 29.29 kg/m².  Body surface area is 2.28 meters squared.    Intake/Output - Last 3 Shifts       12/29 0700 - 12/30 0659 12/30 0700 - 12/31 0659 12/31 0700 - 01/01 0659    P.O. 1540 1200     Total Intake(mL/kg) 1540 (15.7) 1200 (11.9)     Urine (mL/kg/hr) 1250 (0.5)      Total Output 1250        Net +290 +1200             Urine Occurrence 3 x 4 x     Stool Occurrence 0 x 1 x     Emesis Occurrence 0 x 0 x           Physical Exam   Constitutional: He is oriented to person, place, and time. He appears well-nourished. He is cooperative.   HENT:   Head: Normocephalic and atraumatic.   Eyes: Lids are normal.   Neck: Trachea normal and normal range of motion. Carotid bruit is not present.   Cardiovascular: Regular rhythm and normal heart sounds.  Tachycardia present.    Pulmonary/Chest: Effort normal and breath sounds normal.   Abdominal: Normal appearance and bowel sounds are normal.   Musculoskeletal: Normal range of motion.   BLE weakness, unable to ambulate without total assit   Lymphadenopathy:   None palpable    Neurological: He is oriented  to person, place, and time. Abnormal gait: Unable to ambulate without total assist.   Skin: Skin is dry. He is not diaphoretic. No pallor.        Numerous vesicular type lesions, derm to perform skin bx. See graphical documentation for some areas where lesions were found. Derm following   Psychiatric: He has a normal mood and affect. His speech is normal and behavior is normal. Judgment and thought content normal. Delayed: slow to respond. Cognition and memory are impaired. Depressed: somewhat flat affect.     Significant Labs:   Labs have been reviewed.    Assessment/Plan:     * Neoplasm of uncertain behavior of skin    - with multiple lesions as demonstrated in graphical documentation on physical exam, first seen about 1.5 months ago  - previously bx'd at outside hospital 11/2017, unsure which facility per family, no records. Was reported by Dr. Patricia to be CD30+ lymphoproliferative disorder   - derm consulted for additional skin bx, appreciate bx and recs  - pending results from skin bx on 12/28/17         T-cell lymphoma    - possible dx due to previous skin bx from 11/2017 reported to Dr. Patricia as CD30+ lymphoproliferative disorder  - repeat skin bx done by derm 12/28/17, pending results  - BM bx performed 12/28/17 with flow, cyto, dna/rna hold, and t cell gene rearrangement, pending results  - skeletal survey ordered 12/28/17, did not show metastatic disease  - started on prednisone 1 mg/kg/day on 12/29/17 (100 mg daily). Continue for now.   - patient may have a biphenotypic process. Continue to monitor.        History of peripheral stem cell transplant    - had silva auto SCT 6/25/14 for myeloma  - 1 year restaging marrow on 7/24/15 showed 40% cellularity with trilineage hematopoiesis and no morphologic evidence of plasma cells.  Cytogenetics could not be done as the metaphase cells did not grow.   - PET CT did not reveal any evidence of myeloma at 1 year   - skeletal survey was negative        Multiple  myeloma in remission    See oncologic hx on H&P for more information  - had silva auto 6/25/14   - was in CR at 1 year  - hx of plasmacytomas prior to  - maintenance Revlimid on hold for 3-4 weeks due to thrombocytopenia. Will continue to hold at this time  - now with BLE weakness, inability to walk--PT/OT ordered  - BM bx performed 12/28/17, results are pending         Cytopenia    - Revlimid has been on hold for 3-4 weeks due to thrombocytopenia  - hemoglovin is 7.9 g/dL. Platelet count is 28 k/uL  - Transfuse for hgb <7 g/dL, plt <10 k/uL         Dysphagia    - Chronic, secondary to dementia  - He has ongoing swallowing problems  - He has refused a G-tube on multiple occasions in the past per chart  - continue to monitor.        Cardiomyopathy    - daily weights  - lasix 20 mg daily, will replace K if needed on a daily basis  - hx of decreased EF 30-40%         Memory impairment    - Per spouse, pt with demenia  - continue home memantine and galantamine         Depression    - no acute issus. Continue home amitriptyline, olanzapine        Hypertension    - blood pressure is within acceptable limits  - continue home coreg and lisinopril          BPH (benign prostatic hyperplasia)    - continue home finasteride          Neuropathy    - continue home gabapentin and norco  - unable to walk without total assist  - PT/OT          Glaucoma    - Per spouse  - continue home eye drops              VTE Risk Mitigation         Ordered     High Risk of VTE  Once      12/28/17 1534     Place sequential compression device  Until discontinued      12/28/17 1534     Place DARIUS hose  Until discontinued      12/28/17 1534        Disposition: await results of skin biopsy and bone marrow biopsy. Continue supportive care.    Costa aRm MD  Bone Marrow Transplant  [facility]

## 2017-12-31 NOTE — ASSESSMENT & PLAN NOTE
- Revlimid has been on hold for 3-4 weeks due to thrombocytopenia  - hemoglovin is 7.9 g/dL. Platelet count is 28 k/uL  - Transfuse for hgb <7 g/dL, plt <10 k/uL

## 2017-12-31 NOTE — ASSESSMENT & PLAN NOTE
- Chronic, secondary to dementia  - He has ongoing swallowing problems  - He has refused a G-tube on multiple occasions in the past per chart  - continue to monitor.

## 2017-12-31 NOTE — SUBJECTIVE & OBJECTIVE
Subjective:     Interval History:   - no acute issues overnight. Awaiting results of skin lesion biopsy and bone marrow biopsy.  - his cough has improved slightly. He denies shortness of breath, chest pain, nausea, vomiting, diarrhea, constipation.    Objective:     Vital Signs (Most Recent):  Temp: 98.3 °F (36.8 °C) (12/31/17 1200)  Pulse: 104 (12/31/17 1200)  Resp: 18 (12/31/17 1200)  BP: (!) 140/80 (12/31/17 1200)  SpO2: 98 % (12/31/17 1200) Vital Signs (24h Range):  Temp:  [98 °F (36.7 °C)-98.3 °F (36.8 °C)] 98.3 °F (36.8 °C)  Pulse:  [] 104  Resp:  [16-18] 18  SpO2:  [98 %-100 %] 98 %  BP: (110-157)/(70-80) 140/80     Weight: 100.7 kg (222 lb 0.1 oz)  Body mass index is 29.29 kg/m².  Body surface area is 2.28 meters squared.    Intake/Output - Last 3 Shifts       12/29 0700 - 12/30 0659 12/30 0700 - 12/31 0659 12/31 0700 - 01/01 0659    P.O. 1540 1200     Total Intake(mL/kg) 1540 (15.7) 1200 (11.9)     Urine (mL/kg/hr) 1250 (0.5)      Total Output 1250        Net +290 +1200             Urine Occurrence 3 x 4 x     Stool Occurrence 0 x 1 x     Emesis Occurrence 0 x 0 x           Physical Exam   Constitutional: He is oriented to person, place, and time. He appears well-nourished. He is cooperative.   HENT:   Head: Normocephalic and atraumatic.   Eyes: Lids are normal.   Neck: Trachea normal and normal range of motion. Carotid bruit is not present.   Cardiovascular: Regular rhythm and normal heart sounds.  Tachycardia present.    Pulmonary/Chest: Effort normal and breath sounds normal.   Abdominal: Normal appearance and bowel sounds are normal.   Musculoskeletal: Normal range of motion.   BLE weakness, unable to ambulate without total assit   Lymphadenopathy:   None palpable    Neurological: He is oriented to person, place, and time. Abnormal gait: Unable to ambulate without total assist.   Skin: Skin is dry. He is not diaphoretic. No pallor.        Numerous vesicular type lesions, derm to perform skin bx.  See graphical documentation for some areas where lesions were found. Derm following   Psychiatric: He has a normal mood and affect. His speech is normal and behavior is normal. Judgment and thought content normal. Delayed: slow to respond. Cognition and memory are impaired. Depressed: somewhat flat affect.     Significant Labs:   Labs have been reviewed.

## 2017-12-31 NOTE — NURSING
BMT notified of apical pulse 125. Pt c/o of pain to right leg but is other wise without symptom. Awaiting orders. Will continue to monitor.

## 2017-12-31 NOTE — PLAN OF CARE
Problem: Patient Care Overview  Goal: Plan of Care Review  Outcome: Ongoing (interventions implemented as appropriate)  Pt and spouse both acknowledged understanding of POC. Pt remains free from falls/injuries, VSS. Pain managed with with PRN norco. Pt tolerating regular diet with nectar thick liquids. Wife @ bedside assisting with care. Up to bedside commode with 1 assist. Independently repositioning himself in bed. Bed in lowest/ locked position. Call light in reach. Non skid socks on when ambulating. WCTM.

## 2018-01-01 LAB
ABO + RH BLD: NORMAL
ALBUMIN SERPL BCP-MCNC: 2.4 G/DL
ALP SERPL-CCNC: 149 U/L
ALT SERPL W/O P-5'-P-CCNC: 16 U/L
ANION GAP SERPL CALC-SCNC: 9 MMOL/L
ANISOCYTOSIS BLD QL SMEAR: SLIGHT
AST SERPL-CCNC: 28 U/L
BASOPHILS # BLD AUTO: 0 K/UL
BASOPHILS NFR BLD: 0 %
BILIRUB SERPL-MCNC: 0.4 MG/DL
BLD GP AB SCN CELLS X3 SERPL QL: NORMAL
BUN SERPL-MCNC: 25 MG/DL
CALCIUM SERPL-MCNC: 9.4 MG/DL
CHLORIDE SERPL-SCNC: 104 MMOL/L
CO2 SERPL-SCNC: 28 MMOL/L
CREAT SERPL-MCNC: 1 MG/DL
DIFFERENTIAL METHOD: ABNORMAL
EOSINOPHIL # BLD AUTO: 0 K/UL
EOSINOPHIL NFR BLD: 0 %
ERYTHROCYTE [DISTWIDTH] IN BLOOD BY AUTOMATED COUNT: 15.9 %
EST. GFR  (AFRICAN AMERICAN): >60 ML/MIN/1.73 M^2
EST. GFR  (NON AFRICAN AMERICAN): >60 ML/MIN/1.73 M^2
GLUCOSE SERPL-MCNC: 103 MG/DL
HCT VFR BLD AUTO: 26.8 %
HGB BLD-MCNC: 8.8 G/DL
IMM GRANULOCYTES # BLD AUTO: 0.03 K/UL
IMM GRANULOCYTES NFR BLD AUTO: 0.5 %
LYMPHOCYTES # BLD AUTO: 1.1 K/UL
LYMPHOCYTES NFR BLD: 16.8 %
MAGNESIUM SERPL-MCNC: 1.9 MG/DL
MCH RBC QN AUTO: 32.4 PG
MCHC RBC AUTO-ENTMCNC: 32.8 G/DL
MCV RBC AUTO: 99 FL
MONOCYTES # BLD AUTO: 0.8 K/UL
MONOCYTES NFR BLD: 12.4 %
NEUTROPHILS # BLD AUTO: 4.6 K/UL
NEUTROPHILS NFR BLD: 70.3 %
NRBC BLD-RTO: 1 /100 WBC
PHOSPHATE SERPL-MCNC: 3.1 MG/DL
PLATELET # BLD AUTO: 30 K/UL
PLATELET BLD QL SMEAR: ABNORMAL
PMV BLD AUTO: 13.7 FL
POTASSIUM SERPL-SCNC: 4.5 MMOL/L
PROT SERPL-MCNC: 7.3 G/DL
RBC # BLD AUTO: 2.72 M/UL
SODIUM SERPL-SCNC: 141 MMOL/L
WBC # BLD AUTO: 6.54 K/UL

## 2018-01-01 PROCEDURE — 83735 ASSAY OF MAGNESIUM: CPT

## 2018-01-01 PROCEDURE — 20600001 HC STEP DOWN PRIVATE ROOM

## 2018-01-01 PROCEDURE — 84100 ASSAY OF PHOSPHORUS: CPT

## 2018-01-01 PROCEDURE — 86850 RBC ANTIBODY SCREEN: CPT

## 2018-01-01 PROCEDURE — 99233 SBSQ HOSP IP/OBS HIGH 50: CPT | Mod: ,,, | Performed by: INTERNAL MEDICINE

## 2018-01-01 PROCEDURE — 85025 COMPLETE CBC W/AUTO DIFF WBC: CPT

## 2018-01-01 PROCEDURE — 63600175 PHARM REV CODE 636 W HCPCS: Performed by: INTERNAL MEDICINE

## 2018-01-01 PROCEDURE — 25000003 PHARM REV CODE 250: Performed by: NURSE PRACTITIONER

## 2018-01-01 PROCEDURE — 80053 COMPREHEN METABOLIC PANEL: CPT

## 2018-01-01 PROCEDURE — 25000003 PHARM REV CODE 250: Performed by: INTERNAL MEDICINE

## 2018-01-01 RX ORDER — HYDROMORPHONE HYDROCHLORIDE 2 MG/ML
1 INJECTION, SOLUTION INTRAMUSCULAR; INTRAVENOUS; SUBCUTANEOUS
Status: DISCONTINUED | OUTPATIENT
Start: 2018-01-01 | End: 2018-01-04 | Stop reason: HOSPADM

## 2018-01-01 RX ORDER — HYDROMORPHONE HYDROCHLORIDE 2 MG/ML
1 INJECTION, SOLUTION INTRAMUSCULAR; INTRAVENOUS; SUBCUTANEOUS EVERY 6 HOURS PRN
Status: DISCONTINUED | OUTPATIENT
Start: 2018-01-01 | End: 2018-01-01

## 2018-01-01 RX ORDER — HYDROMORPHONE HYDROCHLORIDE 1 MG/ML
1 INJECTION, SOLUTION INTRAMUSCULAR; INTRAVENOUS; SUBCUTANEOUS EVERY 6 HOURS PRN
Status: DISCONTINUED | OUTPATIENT
Start: 2018-01-01 | End: 2018-01-01

## 2018-01-01 RX ADMIN — CYCLOBENZAPRINE HYDROCHLORIDE 5 MG: 5 TABLET, FILM COATED ORAL at 09:01

## 2018-01-01 RX ADMIN — TRAVOPROST 1 DROP: 0.04 SOLUTION/ DROPS OPHTHALMIC at 09:01

## 2018-01-01 RX ADMIN — MEMANTINE HYDROCHLORIDE 5 MG: 5 TABLET ORAL at 09:01

## 2018-01-01 RX ADMIN — AMITRIPTYLINE HYDROCHLORIDE 50 MG: 50 TABLET, FILM COATED ORAL at 08:01

## 2018-01-01 RX ADMIN — CARVEDILOL 6.25 MG: 6.25 TABLET, FILM COATED ORAL at 09:01

## 2018-01-01 RX ADMIN — LISINOPRIL 10 MG: 10 TABLET ORAL at 09:01

## 2018-01-01 RX ADMIN — FAMOTIDINE 20 MG: 20 TABLET, FILM COATED ORAL at 08:01

## 2018-01-01 RX ADMIN — FLUTICASONE FUROATE AND VILANTEROL TRIFENATATE 1 PUFF: 100; 25 POWDER RESPIRATORY (INHALATION) at 09:01

## 2018-01-01 RX ADMIN — GABAPENTIN 300 MG: 300 CAPSULE ORAL at 08:01

## 2018-01-01 RX ADMIN — HYDROCODONE BITARTRATE AND ACETAMINOPHEN 2 TABLET: 7.5; 325 TABLET ORAL at 09:01

## 2018-01-01 RX ADMIN — GABAPENTIN 300 MG: 300 CAPSULE ORAL at 09:01

## 2018-01-01 RX ADMIN — GALANTAMINE HYDROBROMIDE 16 MG: 4 TABLET, FILM COATED ORAL at 06:01

## 2018-01-01 RX ADMIN — FAMOTIDINE 20 MG: 20 TABLET, FILM COATED ORAL at 09:01

## 2018-01-01 RX ADMIN — FINASTERIDE 5 MG: 5 TABLET, FILM COATED ORAL at 01:01

## 2018-01-01 RX ADMIN — GABAPENTIN 300 MG: 300 CAPSULE ORAL at 01:01

## 2018-01-01 RX ADMIN — MEMANTINE HYDROCHLORIDE 5 MG: 5 TABLET ORAL at 08:01

## 2018-01-01 RX ADMIN — CARVEDILOL 6.25 MG: 6.25 TABLET, FILM COATED ORAL at 08:01

## 2018-01-01 RX ADMIN — PREDNISONE 100 MG: 20 TABLET ORAL at 09:01

## 2018-01-01 RX ADMIN — HYDROMORPHONE HYDROCHLORIDE 1 MG: 2 INJECTION INTRAMUSCULAR; INTRAVENOUS; SUBCUTANEOUS at 03:01

## 2018-01-01 RX ADMIN — OLANZAPINE 7.5 MG: 5 TABLET, FILM COATED ORAL at 08:01

## 2018-01-01 RX ADMIN — HYDROMORPHONE HYDROCHLORIDE 1 MG: 2 INJECTION INTRAMUSCULAR; INTRAVENOUS; SUBCUTANEOUS at 08:01

## 2018-01-01 RX ADMIN — HYDROCODONE BITARTRATE AND ACETAMINOPHEN 2 TABLET: 7.5; 325 TABLET ORAL at 11:01

## 2018-01-01 RX ADMIN — GALANTAMINE HYDROBROMIDE 16 MG: 4 TABLET, FILM COATED ORAL at 09:01

## 2018-01-01 RX ADMIN — CYCLOBENZAPRINE HYDROCHLORIDE 5 MG: 5 TABLET, FILM COATED ORAL at 06:01

## 2018-01-01 RX ADMIN — FUROSEMIDE 20 MG: 20 TABLET ORAL at 09:01

## 2018-01-01 RX ADMIN — HYDROCODONE BITARTRATE AND ACETAMINOPHEN 2 TABLET: 7.5; 325 TABLET ORAL at 06:01

## 2018-01-01 NOTE — PROGRESS NOTES
[facility]  Hematology  Bone Marrow Transplant  Progress Note    Patient Name: Lane Nunez Jr.  Admission Date: 12/28/2017  Hospital Length of Stay: 4 days  Code Status: Full Code    Subjective:     Interval History:   - no acute issues overnight. Awaiting results of skin lesion biopsy and bone marrow biopsy.  - he complains of feeling very tired today. He denies shortness of breath, chest pain, nausea, vomiting, diarrhea, constipation.    Objective:     Vital Signs (Most Recent):  Temp: 98.8 °F (37.1 °C) (01/01/18 1308)  Pulse: 104 (01/01/18 1308)  Resp: 18 (01/01/18 1308)  BP: (!) 154/83 (01/01/18 1308)  SpO2: 96 % (01/01/18 1308) Vital Signs (24h Range):  Temp:  [97.8 °F (36.6 °C)-98.8 °F (37.1 °C)] 98.8 °F (37.1 °C)  Pulse:  [] 104  Resp:  [14-22] 18  SpO2:  [94 %-99 %] 96 %  BP: (130-185)/(72-90) 154/83     Weight: 100.7 kg (222 lb 0.1 oz)  Body mass index is 29.29 kg/m².  Body surface area is 2.28 meters squared.    Intake/Output - Last 3 Shifts       12/30 0700 - 12/31 0659 12/31 0700 - 01/01 0659 01/01 0700 - 01/02 0659    P.O. 1200 1200     Total Intake(mL/kg) 1200 (11.9) 1200 (11.9)     Urine (mL/kg/hr)       Total Output          Net +1200 +1200             Urine Occurrence 4 x 3 x     Stool Occurrence 1 x 0 x     Emesis Occurrence 0 x 0 x           Physical Exam   Constitutional: He is oriented to person, place, and time. He appears well-developed and well-nourished. He is cooperative.   fatigued   HENT:   Head: Normocephalic and atraumatic.   Eyes: Lids are normal.   Neck: Trachea normal and normal range of motion. Carotid bruit is not present.   Cardiovascular: Regular rhythm and normal heart sounds.  Tachycardia present.    tachycardic   Pulmonary/Chest: Effort normal and breath sounds normal.   Abdominal: Normal appearance and bowel sounds are normal.   Musculoskeletal: Normal range of motion.   BLE weakness, unable to ambulate without total assit   Lymphadenopathy:   None  palpable    Neurological: He is oriented to person, place, and time. Abnormal gait: Unable to ambulate without total assist.   Skin: Skin is dry. No pallor.        Numerous vesicular type lesions, derm to perform skin bx. See graphical documentation for some areas where lesions were found. Derm following   Psychiatric: He has a normal mood and affect. His speech is normal and behavior is normal. Judgment and thought content normal. Delayed: slow to respond. Cognition and memory are impaired. Depressed: somewhat flat affect.   Nursing note and vitals reviewed.    Significant Labs:   Labs have been reviewed.    Assessment/Plan:     * Neoplasm of uncertain behavior of skin    - with multiple lesions as demonstrated in graphical documentation on physical exam, first seen about 1.5 months ago  - previously bx'd at outside hospital 11/2017, unsure which facility per family, no records. Was reported by Dr. Patricia to be CD30+ lymphoproliferative disorder   - derm consulted for additional skin bx, appreciate bx and recs  - pending results from skin bx on 12/28/17         T-cell lymphoma    - possible dx due to previous skin bx from 11/2017 reported to Dr. Patricia as CD30+ lymphoproliferative disorder  - repeat skin bx done by derm 12/28/17, pending results  - BM bx performed 12/28/17 with flow, cyto, dna/rna hold, and t cell gene rearrangement, pending results  - skeletal survey ordered 12/28/17, did not show metastatic disease  - started on prednisone 1 mg/kg/day on 12/29/17 (100 mg daily). Continue for now.   - patient may have a biphenotypic process. Continue to monitor.        History of peripheral stem cell transplant    - had silva auto SCT 6/25/14 for myeloma  - 1 year restaging marrow on 7/24/15 showed 40% cellularity with trilineage hematopoiesis and no morphologic evidence of plasma cells.  Cytogenetics could not be done as the metaphase cells did not grow.   - PET CT did not reveal any evidence of myeloma at 1 year    - skeletal survey was negative        Multiple myeloma in remission    See oncologic hx on H&P for more information  - had silva auto 6/25/14   - was in CR at 1 year  - hx of plasmacytomas prior to  - maintenance Revlimid on hold for 3-4 weeks due to thrombocytopenia. Will continue to hold at this time  - now with BLE weakness, inability to walk--PT/OT ordered  - BM bx performed 12/28/17, results are pending         Cytopenia    - Revlimid has been on hold for 3-4 weeks due to thrombocytopenia  - hemoglovin is 8.8 g/dL. Platelet count is 30 k/uL  - Transfuse for hgb <7 g/dL, plt <10 k/uL         Dysphagia    - Chronic, secondary to dementia  - He has ongoing swallowing problems  - He has refused a G-tube on multiple occasions in the past per chart  - continue to monitor.        Cardiomyopathy    - daily weights  - lasix 20 mg daily, will replace K if needed on a daily basis  - hx of decreased EF 30-40%         Memory impairment    - Per spouse, pt with demenia  - continue home memantine and galantamine         Depression    - no acute issus. Continue home amitriptyline, olanzapine        Hypertension    - blood pressure is within acceptable limits  - continue home coreg and lisinopril          BPH (benign prostatic hyperplasia)    - continue home finasteride          Neuropathy    - continue home gabapentin and norco  - unable to walk without total assist  - PT/OT          Glaucoma    - Per spouse  - continue home eye drops              VTE Risk Mitigation         Ordered     High Risk of VTE  Once      12/28/17 1534     Place sequential compression device  Until discontinued      12/28/17 1534     Place DARIUS hose  Until discontinued      12/28/17 1534          Disposition: await results of skin biopsy and bone marrow biopsy. Continue supportive care.    Costa Ram MD  Bone Marrow Transplant  [facility]

## 2018-01-01 NOTE — PLAN OF CARE
Problem: Patient Care Overview  Goal: Plan of Care Review  Outcome: Ongoing (interventions implemented as appropriate)  POC reviewed with pt and spouse; both acknowledged understanding.Pt remains free from falls/injuries, VSS. Pain managed with with PRN norco and flexeril. Pt tolerating regular diet with nectar thick liquids. Wife @ bedside assisting with care. Up to bedside commode with 1 assist. Independently repositioning himself in bed. Bed in lowest/ locked position. Call light in reach. Non skid socks on when ambulating. WCTM.

## 2018-01-01 NOTE — ASSESSMENT & PLAN NOTE
- Revlimid has been on hold for 3-4 weeks due to thrombocytopenia  - hemoglovin is 8.8 g/dL. Platelet count is 30 k/uL  - Transfuse for hgb <7 g/dL, plt <10 k/uL

## 2018-01-01 NOTE — PLAN OF CARE
Problem: Patient Care Overview  Goal: Plan of Care Review  Outcome: Ongoing (interventions implemented as appropriate)  Biopsy results still pending. Dilaudid added for pain. Poor appetite. VSS. Pt pleasant and cooperative with care. Wife at bedside, attentive to patient.

## 2018-01-01 NOTE — SUBJECTIVE & OBJECTIVE
Subjective:     Interval History:   - no acute issues overnight. Awaiting results of skin lesion biopsy and bone marrow biopsy.  - he complains of feeling very tired today. He denies shortness of breath, chest pain, nausea, vomiting, diarrhea, constipation.    Objective:     Vital Signs (Most Recent):  Temp: 98.8 °F (37.1 °C) (01/01/18 1308)  Pulse: 104 (01/01/18 1308)  Resp: 18 (01/01/18 1308)  BP: (!) 154/83 (01/01/18 1308)  SpO2: 96 % (01/01/18 1308) Vital Signs (24h Range):  Temp:  [97.8 °F (36.6 °C)-98.8 °F (37.1 °C)] 98.8 °F (37.1 °C)  Pulse:  [] 104  Resp:  [14-22] 18  SpO2:  [94 %-99 %] 96 %  BP: (130-185)/(72-90) 154/83     Weight: 100.7 kg (222 lb 0.1 oz)  Body mass index is 29.29 kg/m².  Body surface area is 2.28 meters squared.    Intake/Output - Last 3 Shifts       12/30 0700 - 12/31 0659 12/31 0700 - 01/01 0659 01/01 0700 - 01/02 0659    P.O. 1200 1200     Total Intake(mL/kg) 1200 (11.9) 1200 (11.9)     Urine (mL/kg/hr)       Total Output          Net +1200 +1200             Urine Occurrence 4 x 3 x     Stool Occurrence 1 x 0 x     Emesis Occurrence 0 x 0 x           Physical Exam   Constitutional: He is oriented to person, place, and time. He appears well-developed and well-nourished. He is cooperative.   fatigued   HENT:   Head: Normocephalic and atraumatic.   Eyes: Lids are normal.   Neck: Trachea normal and normal range of motion. Carotid bruit is not present.   Cardiovascular: Regular rhythm and normal heart sounds.  Tachycardia present.    tachycardic   Pulmonary/Chest: Effort normal and breath sounds normal.   Abdominal: Normal appearance and bowel sounds are normal.   Musculoskeletal: Normal range of motion.   BLE weakness, unable to ambulate without total assit   Lymphadenopathy:   None palpable    Neurological: He is oriented to person, place, and time. Abnormal gait: Unable to ambulate without total assist.   Skin: Skin is dry. No pallor.        Numerous vesicular type lesions, derm  to perform skin bx. See graphical documentation for some areas where lesions were found. Derm following   Psychiatric: He has a normal mood and affect. His speech is normal and behavior is normal. Judgment and thought content normal. Delayed: slow to respond. Cognition and memory are impaired. Depressed: somewhat flat affect.   Nursing note and vitals reviewed.    Significant Labs:   Labs have been reviewed.

## 2018-01-01 NOTE — PLAN OF CARE
Problem: Patient Care Overview  Goal: Plan of Care Review  Outcome: Ongoing (interventions implemented as appropriate)  Bone marrow and skin biopsy taken on 12/28, results still pending. No acute changes. Pt withdrawn but cooperative. Flexeril added for muscle pain. Cardiac monitoring ordered for pt's tachycardia this morning (HR 120s).

## 2018-01-02 LAB
ALBUMIN SERPL BCP-MCNC: 2.2 G/DL
ALP SERPL-CCNC: 174 U/L
ALT SERPL W/O P-5'-P-CCNC: 14 U/L
ANION GAP SERPL CALC-SCNC: 11 MMOL/L
AST SERPL-CCNC: 26 U/L
BASOPHILS # BLD AUTO: 0 K/UL
BASOPHILS NFR BLD: 0 %
BILIRUB SERPL-MCNC: 0.4 MG/DL
BUN SERPL-MCNC: 24 MG/DL
CALCIUM SERPL-MCNC: 9.2 MG/DL
CHLORIDE SERPL-SCNC: 104 MMOL/L
CO2 SERPL-SCNC: 25 MMOL/L
CREAT SERPL-MCNC: 0.9 MG/DL
DIFFERENTIAL METHOD: ABNORMAL
EOSINOPHIL # BLD AUTO: 0 K/UL
EOSINOPHIL NFR BLD: 0 %
ERYTHROCYTE [DISTWIDTH] IN BLOOD BY AUTOMATED COUNT: 16.1 %
EST. GFR  (AFRICAN AMERICAN): >60 ML/MIN/1.73 M^2
EST. GFR  (NON AFRICAN AMERICAN): >60 ML/MIN/1.73 M^2
GLUCOSE SERPL-MCNC: 91 MG/DL
HCT VFR BLD AUTO: 29.4 %
HGB BLD-MCNC: 9.6 G/DL
IMM GRANULOCYTES # BLD AUTO: 0.03 K/UL
IMM GRANULOCYTES NFR BLD AUTO: 0.5 %
LYMPHOCYTES # BLD AUTO: 1.3 K/UL
LYMPHOCYTES NFR BLD: 21.4 %
MAGNESIUM SERPL-MCNC: 1.8 MG/DL
MCH RBC QN AUTO: 32 PG
MCHC RBC AUTO-ENTMCNC: 32.7 G/DL
MCV RBC AUTO: 98 FL
MONOCYTES # BLD AUTO: 1 K/UL
MONOCYTES NFR BLD: 15.8 %
NEUTROPHILS # BLD AUTO: 3.9 K/UL
NEUTROPHILS NFR BLD: 62.3 %
NRBC BLD-RTO: 1 /100 WBC
PHOSPHATE SERPL-MCNC: 2.6 MG/DL
PLATELET # BLD AUTO: 27 K/UL
PMV BLD AUTO: 10.7 FL
POTASSIUM SERPL-SCNC: 3.8 MMOL/L
PROT SERPL-MCNC: 6.9 G/DL
RBC # BLD AUTO: 3 M/UL
SODIUM SERPL-SCNC: 140 MMOL/L
T CELL GENE REARRANGEMENT, BM: NORMAL
WBC # BLD AUTO: 6.25 K/UL

## 2018-01-02 PROCEDURE — 84100 ASSAY OF PHOSPHORUS: CPT

## 2018-01-02 PROCEDURE — 25000003 PHARM REV CODE 250: Performed by: INTERNAL MEDICINE

## 2018-01-02 PROCEDURE — 63600175 PHARM REV CODE 636 W HCPCS: Performed by: INTERNAL MEDICINE

## 2018-01-02 PROCEDURE — 20600001 HC STEP DOWN PRIVATE ROOM

## 2018-01-02 PROCEDURE — 25000003 PHARM REV CODE 250: Performed by: NURSE PRACTITIONER

## 2018-01-02 PROCEDURE — 99233 SBSQ HOSP IP/OBS HIGH 50: CPT | Mod: ,,, | Performed by: INTERNAL MEDICINE

## 2018-01-02 PROCEDURE — 80053 COMPREHEN METABOLIC PANEL: CPT

## 2018-01-02 PROCEDURE — 83735 ASSAY OF MAGNESIUM: CPT

## 2018-01-02 PROCEDURE — 36415 COLL VENOUS BLD VENIPUNCTURE: CPT

## 2018-01-02 PROCEDURE — 85025 COMPLETE CBC W/AUTO DIFF WBC: CPT

## 2018-01-02 RX ADMIN — FAMOTIDINE 20 MG: 20 TABLET, FILM COATED ORAL at 08:01

## 2018-01-02 RX ADMIN — OLANZAPINE 7.5 MG: 5 TABLET, FILM COATED ORAL at 09:01

## 2018-01-02 RX ADMIN — CARVEDILOL 6.25 MG: 6.25 TABLET, FILM COATED ORAL at 08:01

## 2018-01-02 RX ADMIN — CYCLOBENZAPRINE HYDROCHLORIDE 5 MG: 5 TABLET, FILM COATED ORAL at 05:01

## 2018-01-02 RX ADMIN — LISINOPRIL 10 MG: 10 TABLET ORAL at 08:01

## 2018-01-02 RX ADMIN — DIAZEPAM 5 MG: 5 TABLET ORAL at 07:01

## 2018-01-02 RX ADMIN — HYDROMORPHONE HYDROCHLORIDE 1 MG: 2 INJECTION INTRAMUSCULAR; INTRAVENOUS; SUBCUTANEOUS at 05:01

## 2018-01-02 RX ADMIN — CARVEDILOL 6.25 MG: 6.25 TABLET, FILM COATED ORAL at 09:01

## 2018-01-02 RX ADMIN — GALANTAMINE HYDROBROMIDE 16 MG: 4 TABLET, FILM COATED ORAL at 07:01

## 2018-01-02 RX ADMIN — DIAZEPAM 5 MG: 5 TABLET ORAL at 09:01

## 2018-01-02 RX ADMIN — HYDROCODONE BITARTRATE AND ACETAMINOPHEN 2 TABLET: 7.5; 325 TABLET ORAL at 09:01

## 2018-01-02 RX ADMIN — MEMANTINE HYDROCHLORIDE 5 MG: 5 TABLET ORAL at 09:01

## 2018-01-02 RX ADMIN — MEMANTINE HYDROCHLORIDE 5 MG: 5 TABLET ORAL at 08:01

## 2018-01-02 RX ADMIN — FUROSEMIDE 20 MG: 20 TABLET ORAL at 08:01

## 2018-01-02 RX ADMIN — FLUTICASONE FUROATE AND VILANTEROL TRIFENATATE 1 PUFF: 100; 25 POWDER RESPIRATORY (INHALATION) at 09:01

## 2018-01-02 RX ADMIN — GABAPENTIN 300 MG: 300 CAPSULE ORAL at 09:01

## 2018-01-02 RX ADMIN — AMITRIPTYLINE HYDROCHLORIDE 50 MG: 50 TABLET, FILM COATED ORAL at 09:01

## 2018-01-02 RX ADMIN — GABAPENTIN 300 MG: 300 CAPSULE ORAL at 05:01

## 2018-01-02 RX ADMIN — FINASTERIDE 5 MG: 5 TABLET, FILM COATED ORAL at 01:01

## 2018-01-02 RX ADMIN — HYDROMORPHONE HYDROCHLORIDE 1 MG: 2 INJECTION INTRAMUSCULAR; INTRAVENOUS; SUBCUTANEOUS at 08:01

## 2018-01-02 RX ADMIN — TRAVOPROST 1 DROP: 0.04 SOLUTION/ DROPS OPHTHALMIC at 09:01

## 2018-01-02 RX ADMIN — HYDROCODONE BITARTRATE AND ACETAMINOPHEN 2 TABLET: 7.5; 325 TABLET ORAL at 06:01

## 2018-01-02 RX ADMIN — FAMOTIDINE 20 MG: 20 TABLET, FILM COATED ORAL at 09:01

## 2018-01-02 RX ADMIN — GABAPENTIN 300 MG: 300 CAPSULE ORAL at 01:01

## 2018-01-02 RX ADMIN — PREDNISONE 100 MG: 20 TABLET ORAL at 08:01

## 2018-01-02 NOTE — PLAN OF CARE
Problem: Patient Care Overview  Goal: Plan of Care Review  Outcome: Ongoing (interventions implemented as appropriate)  POC reviewed with pt and spouse; both acknowledged understanding. Pt remains free from falls/injuries, VSS. Pt tearful in the beginning of the shift due to pain and overall situation. Pain better controlled after 2nd dose of dilaudid given. Pt denies pain until it becomes severe. Encouraged pt to take norco more regularly before pain becomes 10/10. Times for pain meds updated on white board accordingly. Pt tolerating regular diet with nectar thick liquids. Swallows pills whole in applesauce. Wife @ bedside assisting with care. Up to bedside commode with 1 assist. Independently repositioning himself in bed. Bed in lowest/ locked position. Call light in reach. Non skid socks on when ambulating. Continuing to monitor.

## 2018-01-02 NOTE — SUBJECTIVE & OBJECTIVE
Subjective:     Interval History:   - no acute issues overnight.   - afebrile, tachycardic   - Awaiting results of skin lesion biopsy and bone marrow biopsy.  - lethargic this am but wife states he just took pain meds and is finally getting some sleep.     Objective:     Vital Signs (Most Recent):  Temp: 99.8 °F (37.7 °C) (01/02/18 1232)  Pulse: (!) 119 (01/02/18 1232)  Resp: 18 (01/02/18 1232)  BP: (!) 112/55 (01/02/18 1232)  SpO2: (!) 94 % (01/02/18 1232) Vital Signs (24h Range):  Temp:  [98 °F (36.7 °C)-99.8 °F (37.7 °C)] 99.8 °F (37.7 °C)  Pulse:  [] 119  Resp:  [14-18] 18  SpO2:  [94 %-97 %] 94 %  BP: (112-143)/(55-81) 112/55     Weight: 100.7 kg (222 lb 0.1 oz)  Body mass index is 29.29 kg/m².  Body surface area is 2.28 meters squared.    ECOG SCORE           Intake/Output - Last 3 Shifts       12/31 0700 - 01/01 0659 01/01 0700 - 01/02 0659 01/02 0700 - 01/03 0659    P.O. 1200 480     Total Intake(mL/kg) 1200 (11.9) 480 (4.8)     Net +1200 +480             Urine Occurrence 3 x      Stool Occurrence 0 x 0 x     Emesis Occurrence 0 x            Physical Exam   Constitutional: He is oriented to person, place, and time. He appears well-developed and well-nourished. He appears lethargic. He is cooperative.   HENT:   Head: Normocephalic and atraumatic.   Eyes: Lids are normal.   Neck: Trachea normal and normal range of motion. Carotid bruit is not present.   Cardiovascular: Regular rhythm and normal heart sounds.  Tachycardia present.    tachycardic   Pulmonary/Chest: Effort normal and breath sounds normal.   Abdominal: Normal appearance and bowel sounds are normal.   Musculoskeletal: Normal range of motion.   BLE weakness, unable to ambulate without total assit   Lymphadenopathy:   None palpable    Neurological: He is oriented to person, place, and time. He appears lethargic. Abnormal gait: Unable to ambulate without total assist.   Skin: Skin is dry. No pallor.        Numerous vesicular type lesions. See  graphical documentation for some areas where lesions were found. Derm following   Psychiatric: He has a normal mood and affect. His speech is normal and behavior is normal. Judgment and thought content normal. Delayed: slow to respond. Cognition and memory are impaired. Depressed: somewhat flat affect.   Nursing note and vitals reviewed.      Significant Labs:   CBC:   Recent Labs  Lab 01/01/18 0430 01/02/18 0436   WBC 6.54 6.25   HGB 8.8* 9.6*   HCT 26.8* 29.4*   PLT 30* 27*    and CMP:   Recent Labs  Lab 01/01/18 0430 01/02/18 0436    140   K 4.5 3.8    104   CO2 28 25    91   BUN 25* 24*   CREATININE 1.0 0.9   CALCIUM 9.4 9.2   PROT 7.3 6.9   ALBUMIN 2.4* 2.2*   BILITOT 0.4 0.4   ALKPHOS 149* 174*   AST 28 26   ALT 16 14   ANIONGAP 9 11   EGFRNONAA >60.0 >60.0       Diagnostic Results:  I have reviewed all pertinent imaging results/findings within the past 24 hours.

## 2018-01-02 NOTE — ASSESSMENT & PLAN NOTE
- possible dx due to previous skin bx from 11/2017 reported to Dr. Patricia as CD30+ lymphoproliferative disorder  - repeat skin bx done by derm 12/28/17, pending results  - BM bx performed 12/28/17 with flow, cyto, dna/rna hold, and t cell gene rearrangement, pending results  - skeletal survey ordered 12/28/17, did not show metastatic disease  - CT chest/abdomen from 12/29 with multiple mediastinal and hilar LNand subcutaneous and cutaneous nodules noted to abdominal wall  - started on prednisone 1 mg/kg/day on 12/29/17 (100 mg daily). Continue for now.   - patient may have a biphenotypic process. Continue to monitor.

## 2018-01-02 NOTE — ASSESSMENT & PLAN NOTE
See oncologic hx on H&P for more information  - had silva auto 6/25/14   - was in CR at 1 year  - hx of plasmacytomas prior to  - maintenance Revlimid on hold for 3-4 weeks due to thrombocytopenia. Will continue to hold at this time  - now with BLE weakness, inability to walk--PT/OT ordered  - BM bx performed 12/28/17, results are pending   - recent SPEP with normal protein, FLC from 12/28 with kappa light chain of 4.3, ratio of 1.40

## 2018-01-02 NOTE — PLAN OF CARE
Problem: Patient Care Overview  Goal: Plan of Care Review  Outcome: Ongoing (interventions implemented as appropriate)  Pt resting in bed, medicated for pain per MAR earlier this am,pt has been resting comfortably since then, POC, fall risk and medications reviewed with pt and wife, pt with low appetite, ate only a few bites of meals, telemetry monitoring maintained, safety precautions including call light , signage reviewed with pt and wife, no further needs assessed at this time, will continue to monitor.     Problem: Fall Risk (Adult)  Intervention: Review Medications/Identify Contributors to Fall Risk   01/02/18 1517   Safety Interventions   Medication Review/Management medications reviewed     Intervention: Patient Rounds   01/02/18 1517   Safety Interventions   Patient Rounds bed in low position;bed wheels locked;call light in reach;clutter free environment maintained;ID band on;placement of personal items at bedside;toileting offered;visualized patient     Intervention: Safety Promotion/Fall Prevention   01/02/18 1517   Safety Interventions   Safety Promotion/Fall Prevention assistive device/personal item within reach;bed alarm refused;nonskid shoes/socks when out of bed;family to remain at bedside;Fall Risk reviewed with patient/family;Fall Risk signage in place

## 2018-01-02 NOTE — ASSESSMENT & PLAN NOTE
- Revlimid has been on hold for 3-4 weeks due to thrombocytopenia  - hemoglobin is 9.6 g/dL. Platelet count is 27 k/uL  - Transfuse for hgb <7 g/dL, plt <10 k/uL

## 2018-01-02 NOTE — ASSESSMENT & PLAN NOTE
- had silva auto SCT 6/25/14 for myeloma, 3.5 years post transplant  - 1 year restaging marrow on 7/24/15 showed 40% cellularity with trilineage hematopoiesis and no morphologic evidence of plasma cells.  Cytogenetics could not be done as the metaphase cells did not grow.   - PET CT did not reveal any evidence of myeloma at 1 year   - skeletal survey 12/28/17 was negative

## 2018-01-03 LAB
ALBUMIN SERPL BCP-MCNC: 2.2 G/DL
ALP SERPL-CCNC: 175 U/L
ALT SERPL W/O P-5'-P-CCNC: 16 U/L
ANION GAP SERPL CALC-SCNC: 11 MMOL/L
AST SERPL-CCNC: 21 U/L
BASOPHILS # BLD AUTO: 0 K/UL
BASOPHILS NFR BLD: 0 %
BILIRUB SERPL-MCNC: 0.4 MG/DL
BUN SERPL-MCNC: 24 MG/DL
CALCIUM SERPL-MCNC: 9.2 MG/DL
CHLORIDE SERPL-SCNC: 102 MMOL/L
CO2 SERPL-SCNC: 25 MMOL/L
CREAT SERPL-MCNC: 0.9 MG/DL
DIFFERENTIAL METHOD: ABNORMAL
EOSINOPHIL # BLD AUTO: 0 K/UL
EOSINOPHIL NFR BLD: 0 %
ERYTHROCYTE [DISTWIDTH] IN BLOOD BY AUTOMATED COUNT: 16.2 %
EST. GFR  (AFRICAN AMERICAN): >60 ML/MIN/1.73 M^2
EST. GFR  (NON AFRICAN AMERICAN): >60 ML/MIN/1.73 M^2
GLUCOSE SERPL-MCNC: 114 MG/DL
HCT VFR BLD AUTO: 25.8 %
HGB BLD-MCNC: 8.8 G/DL
IMM GRANULOCYTES # BLD AUTO: 0.04 K/UL
IMM GRANULOCYTES NFR BLD AUTO: 0.6 %
LYMPHOCYTES # BLD AUTO: 1.1 K/UL
LYMPHOCYTES NFR BLD: 15.5 %
MAGNESIUM SERPL-MCNC: 1.9 MG/DL
MCH RBC QN AUTO: 32.5 PG
MCHC RBC AUTO-ENTMCNC: 34.1 G/DL
MCV RBC AUTO: 95 FL
MONOCYTES # BLD AUTO: 1.1 K/UL
MONOCYTES NFR BLD: 15.7 %
NEUTROPHILS # BLD AUTO: 4.9 K/UL
NEUTROPHILS NFR BLD: 68.2 %
NRBC BLD-RTO: 0 /100 WBC
PHOSPHATE SERPL-MCNC: 2.5 MG/DL
PLATELET # BLD AUTO: 27 K/UL
PMV BLD AUTO: ABNORMAL FL
POTASSIUM SERPL-SCNC: 3.8 MMOL/L
PROT SERPL-MCNC: 7.2 G/DL
RBC # BLD AUTO: 2.71 M/UL
SODIUM SERPL-SCNC: 138 MMOL/L
WBC # BLD AUTO: 7.24 K/UL

## 2018-01-03 PROCEDURE — 36415 COLL VENOUS BLD VENIPUNCTURE: CPT

## 2018-01-03 PROCEDURE — 63600175 PHARM REV CODE 636 W HCPCS: Performed by: NURSE PRACTITIONER

## 2018-01-03 PROCEDURE — 83735 ASSAY OF MAGNESIUM: CPT

## 2018-01-03 PROCEDURE — 63600175 PHARM REV CODE 636 W HCPCS: Performed by: INTERNAL MEDICINE

## 2018-01-03 PROCEDURE — 25000003 PHARM REV CODE 250: Performed by: NURSE PRACTITIONER

## 2018-01-03 PROCEDURE — 92526 ORAL FUNCTION THERAPY: CPT

## 2018-01-03 PROCEDURE — 85025 COMPLETE CBC W/AUTO DIFF WBC: CPT

## 2018-01-03 PROCEDURE — 84100 ASSAY OF PHOSPHORUS: CPT

## 2018-01-03 PROCEDURE — 99233 SBSQ HOSP IP/OBS HIGH 50: CPT | Mod: ,,, | Performed by: INTERNAL MEDICINE

## 2018-01-03 PROCEDURE — 80053 COMPREHEN METABOLIC PANEL: CPT

## 2018-01-03 PROCEDURE — 20600001 HC STEP DOWN PRIVATE ROOM

## 2018-01-03 RX ADMIN — PREDNISONE 75 MG: 5 TABLET ORAL at 08:01

## 2018-01-03 RX ADMIN — MEMANTINE HYDROCHLORIDE 5 MG: 5 TABLET ORAL at 09:01

## 2018-01-03 RX ADMIN — MEMANTINE HYDROCHLORIDE 5 MG: 5 TABLET ORAL at 08:01

## 2018-01-03 RX ADMIN — GABAPENTIN 300 MG: 300 CAPSULE ORAL at 02:01

## 2018-01-03 RX ADMIN — AMITRIPTYLINE HYDROCHLORIDE 50 MG: 50 TABLET, FILM COATED ORAL at 09:01

## 2018-01-03 RX ADMIN — GALANTAMINE HYDROBROMIDE 16 MG: 4 TABLET, FILM COATED ORAL at 05:01

## 2018-01-03 RX ADMIN — HYDROCODONE BITARTRATE AND ACETAMINOPHEN 2 TABLET: 7.5; 325 TABLET ORAL at 04:01

## 2018-01-03 RX ADMIN — FINASTERIDE 5 MG: 5 TABLET, FILM COATED ORAL at 12:01

## 2018-01-03 RX ADMIN — HYDROCODONE BITARTRATE AND ACETAMINOPHEN 2 TABLET: 7.5; 325 TABLET ORAL at 07:01

## 2018-01-03 RX ADMIN — HYDROMORPHONE HYDROCHLORIDE 1 MG: 2 INJECTION INTRAMUSCULAR; INTRAVENOUS; SUBCUTANEOUS at 12:01

## 2018-01-03 RX ADMIN — LISINOPRIL 10 MG: 10 TABLET ORAL at 08:01

## 2018-01-03 RX ADMIN — FLUTICASONE FUROATE AND VILANTEROL TRIFENATATE 1 PUFF: 100; 25 POWDER RESPIRATORY (INHALATION) at 08:01

## 2018-01-03 RX ADMIN — HYDROMORPHONE HYDROCHLORIDE 1 MG: 2 INJECTION INTRAMUSCULAR; INTRAVENOUS; SUBCUTANEOUS at 08:01

## 2018-01-03 RX ADMIN — GABAPENTIN 300 MG: 300 CAPSULE ORAL at 05:01

## 2018-01-03 RX ADMIN — FAMOTIDINE 20 MG: 20 TABLET, FILM COATED ORAL at 08:01

## 2018-01-03 RX ADMIN — FAMOTIDINE 20 MG: 20 TABLET, FILM COATED ORAL at 09:01

## 2018-01-03 RX ADMIN — GALANTAMINE HYDROBROMIDE 16 MG: 4 TABLET, FILM COATED ORAL at 08:01

## 2018-01-03 RX ADMIN — CARVEDILOL 6.25 MG: 6.25 TABLET, FILM COATED ORAL at 08:01

## 2018-01-03 RX ADMIN — CARVEDILOL 6.25 MG: 6.25 TABLET, FILM COATED ORAL at 09:01

## 2018-01-03 RX ADMIN — OLANZAPINE 7.5 MG: 5 TABLET, FILM COATED ORAL at 09:01

## 2018-01-03 RX ADMIN — FUROSEMIDE 20 MG: 20 TABLET ORAL at 08:01

## 2018-01-03 RX ADMIN — DIAZEPAM 5 MG: 5 TABLET ORAL at 10:01

## 2018-01-03 RX ADMIN — TRAVOPROST 1 DROP: 0.04 SOLUTION/ DROPS OPHTHALMIC at 09:01

## 2018-01-03 RX ADMIN — GABAPENTIN 300 MG: 300 CAPSULE ORAL at 09:01

## 2018-01-03 NOTE — PLAN OF CARE
Problem: Patient Care Overview  Goal: Plan of Care Review  Pt is AA but confused at times. Vital signs have been stable. Pt complained of pain, PRN medications given as ordered. Diazepam given for anxiety. Call light and personal belongings within reach. Pt get up to bedside commode with assistance. Wife at bedside. Plan of care reviewed with pt and spouse. All questions and concerns were addressed. Will continue to monitor.

## 2018-01-03 NOTE — PHYSICIAN QUERY
"PT Name: Lane Nunez Jr.  MR #: [id number]    Physician Query Form - Heart  Condition Clarification     CDS/: Janina Dyson               Contact information:  This form is a permanent document in the medical record.     Query Date: January 3, 2018    By submitting this query, we are merely seeking further clarification of documentation. Please utilize your independent clinical judgment when addressing the question(s) below.    The medical record contains the following   Indicators     Supporting Clinical Findings Location in Medical Record    BNP     X EF hx of decreased EF 30-40%     Left ventricular ejection fraction less than 40 % 8/23/2017 Progress notes 12/29      Dermatology consult note Past medical hx      Radiology findings      Echo Results      "Ascites" documented      "SOB" or "NEWELL" documented      "Hypoxia" documented     X Heart Failure documented Congestive heart failure H & P past medical hx    "Edema" documented     X Diuretics/Meds Coreg 6.25 po 2 times daily  Furosemide 20mg po daily MAR 1/3   X Treatment: Daily weights  Lasix 20mg daily, will replace K if needed on a daily basis  Hx of decreased EF 30-40% H & P   X Other:  Cardiomyopathy   H & P       Provider, please specify diagnosis or diagnoses associated with above clinical findings.  [ X ] Chronic Systolic Heart Failure (EF < 40)*  [  ] Other Type of Heart Failure (please specify type): _________________________  [  ] Heart Failure Ruled Out  [  ] Other (please specify): ___________________________________  [  ] Clinically Undetermined            *[employer]                                                                                                          Please document in your progress notes daily for the duration of treatment until resolved and include in your discharge summary.    "

## 2018-01-03 NOTE — ASSESSMENT & PLAN NOTE
See oncologic hx on H&P for more information  - had silva auto 6/25/14   - was in CR at 1 year  - hx of plasmacytomas prior to  - maintenance Revlimid on hold for 4 weeks due to thrombocytopenia. Will continue to hold at this time  - now with BLE weakness, inability to walk--PT/OT ordered  - BM bx performed 12/28/17, results are pending   - recent SPEP with normal protein, FLC from 12/28 with kappa light chain of 4.3, ratio of 1.40

## 2018-01-03 NOTE — ASSESSMENT & PLAN NOTE
- Revlimid has been on hold for 3-4 weeks due to thrombocytopenia  - hemoglobin is 8.8 g/dL. Platelet count is 27 k/uL  - Transfuse for hgb <7 g/dL, plt <10 k/uL

## 2018-01-03 NOTE — PROGRESS NOTES
[facility]  Hematology  Bone Marrow Transplant  Progress Note    Patient Name: Lane Nunez Jr.  Admission Date: 12/28/2017  Hospital Length of Stay: 6 days  Code Status: Full Code    Subjective:     Interval History:   - no acute issues overnight.   - afebrile, tachycardic   - Awaiting results of skin lesion biopsy and bone marrow biopsy.  - awake and alert this am with no complaints    Objective:     Vital Signs (Most Recent):  Temp: 98.8 °F (37.1 °C) (01/03/18 1230)  Pulse: 104 (01/03/18 1456)  Resp: 18 (01/03/18 1230)  BP: 107/61 (01/03/18 1230)  SpO2: 97 % (01/03/18 1230) Vital Signs (24h Range):  Temp:  [97.4 °F (36.3 °C)-99 °F (37.2 °C)] 98.8 °F (37.1 °C)  Pulse:  [] 104  Resp:  [16-18] 18  SpO2:  [95 %-99 %] 97 %  BP: (105-133)/(51-72) 107/61     Weight: 99.1 kg (218 lb 7.6 oz)  Body mass index is 28.82 kg/m².  Body surface area is 2.26 meters squared.    ECOG SCORE           Intake/Output - Last 3 Shifts       01/01 0700 - 01/02 0659 01/02 0700 - 01/03 0659 01/03 0700 - 01/04 0659    P.O. 620 120     Total Intake(mL/kg) 620 (6.2) 120 (1.2)     Net +620 +120             Urine Occurrence  1 x     Stool Occurrence 0 x 1 x 0 x          Physical Exam   Constitutional: He is oriented to person, place, and time. He appears well-developed and well-nourished. He appears lethargic. He is cooperative.   HENT:   Head: Normocephalic and atraumatic.   Eyes: Lids are normal.   Neck: Trachea normal and normal range of motion. Carotid bruit is not present.   Cardiovascular: Regular rhythm and normal heart sounds.  Tachycardia present.    tachycardic   Pulmonary/Chest: Effort normal and breath sounds normal.   Abdominal: Normal appearance and bowel sounds are normal.   Musculoskeletal: Normal range of motion.   BLE weakness, unable to ambulate without total assit   Lymphadenopathy:   None palpable    Neurological: He is oriented to person, place, and time. He appears lethargic. Abnormal gait:  Unable to ambulate without total assist.   Skin: Skin is dry. No pallor.        Numerous vesicular type lesions. See graphical documentation for some areas where lesions were found. Derm following   Psychiatric: He has a normal mood and affect. His speech is normal and behavior is normal. Judgment and thought content normal. Delayed: slow to respond. Cognition and memory are impaired. Depressed: somewhat flat affect.   Nursing note and vitals reviewed.      Significant Labs:   CBC:   Recent Labs  Lab 01/02/18  0436 01/03/18  0407   WBC 6.25 7.24   HGB 9.6* 8.8*   HCT 29.4* 25.8*   PLT 27* 27*    and CMP:   Recent Labs  Lab 01/02/18  0436 01/03/18  0407    138   K 3.8 3.8    102   CO2 25 25   GLU 91 114*   BUN 24* 24*   CREATININE 0.9 0.9   CALCIUM 9.2 9.2   PROT 6.9 7.2   ALBUMIN 2.2* 2.2*   BILITOT 0.4 0.4   ALKPHOS 174* 175*   AST 26 21   ALT 14 16   ANIONGAP 11 11   EGFRNONAA >60.0 >60.0       Diagnostic Results:  None    Assessment/Plan:     * Neoplasm of uncertain behavior of skin    - with multiple lesions as demonstrated in graphical documentation on physical exam, first seen about 1.5 months ago  - previously bx'd at outside hospital 11/2017, unsure which facility per family, no records. Was reported by Dr. Patricia to be CD30+ lymphoproliferative disorder   - derm consulted for additional skin bx, appreciate bx and recs  - pending results from skin bx on 12/28/17         Multiple myeloma in remission    See oncologic hx on H&P for more information  - had silva auto 6/25/14   - was in CR at 1 year  - hx of plasmacytomas prior to  - maintenance Revlimid on hold for 4 weeks due to thrombocytopenia. Will continue to hold at this time  - now with BLE weakness, inability to walk--PT/OT ordered  - BM bx performed 12/28/17, results are pending   - recent SPEP with normal protein, FLC from 12/28 with kappa light chain of 4.3, ratio of 1.40        T-cell lymphoma    - possible dx due to previous skin bx from  11/2017 reported to Dr. Patricia as CD30+ lymphoproliferative disorder  - repeat skin bx done by derm 12/28/17, pending results  - BM bx performed 12/28/17 with flow, cyto, dna/rna hold, and t cell gene rearrangement, pending results  - skeletal survey ordered 12/28/17, did not show metastatic disease  - CT chest/abdomen from 12/29 with multiple mediastinal and hilar LNand subcutaneous and cutaneous nodules noted to abdominal wall  - started on prednisone 1 mg/kg/day on 12/29/17 (100 mg daily). Continue for now.   - patient may have a biphenotypic process. Continue to monitor.        History of peripheral stem cell transplant    - had silva auto SCT 6/25/14 for myeloma, 3.5 years post transplant  - 1 year restaging marrow on 7/24/15 showed 40% cellularity with trilineage hematopoiesis and no morphologic evidence of plasma cells.  Cytogenetics could not be done as the metaphase cells did not grow.   - PET CT did not reveal any evidence of myeloma at 1 year   - skeletal survey 12/28/17 was negative  - maintenance Revlimid on hold        Glaucoma    - Per spouse  - continue home eye drops          Neuropathy    - continue home gabapentin and norco  - unable to walk without total assist  - PT/OT          Dysphagia    - Chronic, secondary to dementia  - He has ongoing swallowing problems  - He has refused a G-tube on multiple occasions in the past per chart  - continue to monitor.        Cytopenia    - Revlimid has been on hold for 3-4 weeks due to thrombocytopenia  - hemoglobin is 8.8 g/dL. Platelet count is 27 k/uL  - Transfuse for hgb <7 g/dL, plt <10 k/uL         Cardiomyopathy    - daily weights  - lasix 20 mg daily, will replace K if needed on a daily basis  - hx of decreased EF 30-40%         Hypertension    - blood pressure is within acceptable limits  - continue home coreg and lisinopril          BPH (benign prostatic hyperplasia)    - continue home finasteride          Memory impairment    - Per spouse, pt with  demenia  - continue home memantine and galantamine         Depression    - no acute issus. Continue home amitriptyline, olanzapine            VTE Risk Mitigation         Ordered     High Risk of VTE  Once      12/28/17 1534     Place sequential compression device  Until discontinued      12/28/17 1534     Place DARIUS hose  Until discontinued      12/28/17 1534          Disposition: pending skin and bone marrow biopsy results and possible treatment    Jessica Chan NP  Bone Marrow Transplant  [facility]

## 2018-01-03 NOTE — PT/OT/SLP PROGRESS
"Speech Language Pathology Treatment  Discharge    Patient Name:  Lane Nunez Jr.   MRN:  [id number]  Admitting Diagnosis: Neoplasm of uncertain behavior of skin    Recommendations:                 General Recommendations:  Follow-up not indicated  Diet recommendations:  Regular, Liquid Diet Level: Nectar Thick   Aspiration Precautions: 1 bite/sip at a time, Assistance with thickening liquids, Feed only when awake/alert, HOB to 90 degrees and Small bites/sips   General Precautions: Standard, aspiration, nectar thick, fall  Communication strategies:  none    Subjective     Awake/alert, spouse at bedside    Pain/Comfort:  · Pain Rating 1: 0/10  · Pain Rating Post-Intervention 1: 0/10    Objective:     Has the patient been evaluated by SLP for swallowing?   Yes  Keep patient NPO? No   Current Respiratory Status: room air      Pt upright in bed eating breakfast upon entry. Pt with nectar thick water at bedside and thin milk/coffee. Spouse stated pt does not use thickener in all drinks, and has not since last MBS in 2013. She stated " He takes small sips and usually does fine, but sometimes he coughs bad. He also doesn't take two swallows after every bite all the time."  Pt aware/understands of aspiration risk of consuming thin liquids. Pt tolerated thin milk x6 and biscuit x4 with mild prolonged oral transit and no overt s/s of aspiration. Swallow precautions written on whiteboard in room. Pt's daughter is also and SLP and educates and encourages pt to utilized strategies and thickener at home per wife, but pt still does not put thickener in all liquids. No further ST recommended at this time. Please re-consult if warranted. Pt at baseline at this time.     Assessment:     Lane Nunez Jr. is a 67 y.o. male with an SLP diagnosis of Dysphagia.      Goals:    SLP Goals        Problem: SLP Goal    Goal Priority Disciplines Outcome   SLP Goal     SLP    Description:  Speech Language Pathology Goals  Goals expected to be met " by 1/5:  1. Pt will tolerate regular consistency diet and nectar thick liquids without s/s of aspiration. MET  2. Pt will participate in ongoing swallowing assessment to determine if Modified Barium Swallow Study is warranted to further assess pt's current swallowing function. NOT MET                         Plan:     · Plan of Care reviewed with:  patient, spouse   · SLP Follow-Up:  No       Discharge recommendations:   (tbd )   Barriers to Discharge:  None    Time Tracking:     SLP Treatment Date:   01/03/18  Speech Start Time:  0817  Speech Stop Time:  0828     Speech Total Time (min):  11 min    Billable Minutes: Treatment Swallowing Dysfunction 11    Hallie Sahni CCC-SLP  01/03/2018

## 2018-01-03 NOTE — ASSESSMENT & PLAN NOTE
- had silva auto SCT 6/25/14 for myeloma, 3.5 years post transplant  - 1 year restaging marrow on 7/24/15 showed 40% cellularity with trilineage hematopoiesis and no morphologic evidence of plasma cells.  Cytogenetics could not be done as the metaphase cells did not grow.   - PET CT did not reveal any evidence of myeloma at 1 year   - skeletal survey 12/28/17 was negative  - maintenance Revlimid on hold

## 2018-01-04 ENCOUNTER — TELEPHONE (OUTPATIENT)
Dept: TRANSPLANT | Facility: HOSPITAL | Age: 68
End: 2018-01-04

## 2018-01-04 VITALS
HEART RATE: 104 BPM | DIASTOLIC BLOOD PRESSURE: 67 MMHG | HEIGHT: 73 IN | TEMPERATURE: 98 F | SYSTOLIC BLOOD PRESSURE: 117 MMHG | WEIGHT: 217.38 LBS | OXYGEN SATURATION: 98 % | RESPIRATION RATE: 18 BRPM | BODY MASS INDEX: 28.81 KG/M2

## 2018-01-04 DIAGNOSIS — C85.90 T-CELL LYMPHOMA: ICD-10-CM

## 2018-01-04 DIAGNOSIS — C90.01 MULTIPLE MYELOMA IN REMISSION: ICD-10-CM

## 2018-01-04 DIAGNOSIS — D47.9 LYMPHOPROLIFERATIVE DISORDER: ICD-10-CM

## 2018-01-04 LAB
ABO + RH BLD: NORMAL
ALBUMIN SERPL BCP-MCNC: 2.1 G/DL
ALP SERPL-CCNC: 163 U/L
ALT SERPL W/O P-5'-P-CCNC: 14 U/L
ANION GAP SERPL CALC-SCNC: 6 MMOL/L
ANISOCYTOSIS BLD QL SMEAR: SLIGHT
AST SERPL-CCNC: 14 U/L
BASOPHILS # BLD AUTO: 0 K/UL
BASOPHILS NFR BLD: 0 %
BILIRUB SERPL-MCNC: 0.4 MG/DL
BLD GP AB SCN CELLS X3 SERPL QL: NORMAL
BUN SERPL-MCNC: 29 MG/DL
BURR CELLS BLD QL SMEAR: ABNORMAL
CALCIUM SERPL-MCNC: 9.3 MG/DL
CHLORIDE SERPL-SCNC: 103 MMOL/L
CHROM BANDING METHOD: NORMAL
CHROMOSOME ANALYSIS BM ADDITIONAL INFORMATION: NORMAL
CHROMOSOME ANALYSIS BM RELEASED BY: NORMAL
CHROMOSOME ANALYSIS BM RESULT SUMMARY: NORMAL
CLINICAL CYTOGENETICIST REVIEW: NORMAL
CO2 SERPL-SCNC: 29 MMOL/L
CREAT SERPL-MCNC: 0.9 MG/DL
DACRYOCYTES BLD QL SMEAR: ABNORMAL
DIFFERENTIAL METHOD: ABNORMAL
EOSINOPHIL # BLD AUTO: 0 K/UL
EOSINOPHIL NFR BLD: 0 %
ERYTHROCYTE [DISTWIDTH] IN BLOOD BY AUTOMATED COUNT: 16.5 %
EST. GFR  (AFRICAN AMERICAN): >60 ML/MIN/1.73 M^2
EST. GFR  (NON AFRICAN AMERICAN): >60 ML/MIN/1.73 M^2
GLUCOSE SERPL-MCNC: 90 MG/DL
HCT VFR BLD AUTO: 26.2 %
HGB BLD-MCNC: 8.6 G/DL
HYPOCHROMIA BLD QL SMEAR: ABNORMAL
IMM GRANULOCYTES # BLD AUTO: 0.04 K/UL
IMM GRANULOCYTES NFR BLD AUTO: 0.5 %
KARYOTYP MAR: NORMAL
LYMPHOCYTES # BLD AUTO: 1.1 K/UL
LYMPHOCYTES NFR BLD: 13.7 %
MAGNESIUM SERPL-MCNC: 2 MG/DL
MCH RBC QN AUTO: 31.6 PG
MCHC RBC AUTO-ENTMCNC: 32.8 G/DL
MCV RBC AUTO: 96 FL
MONOCYTES # BLD AUTO: 1.2 K/UL
MONOCYTES NFR BLD: 14.2 %
NEUTROPHILS # BLD AUTO: 5.9 K/UL
NEUTROPHILS NFR BLD: 71.6 %
NRBC BLD-RTO: 0 /100 WBC
OVALOCYTES BLD QL SMEAR: ABNORMAL
PHOSPHATE SERPL-MCNC: 2.6 MG/DL
PLATELET # BLD AUTO: 25 K/UL
PLATELET BLD QL SMEAR: ABNORMAL
PMV BLD AUTO: ABNORMAL FL
POIKILOCYTOSIS BLD QL SMEAR: SLIGHT
POLYCHROMASIA BLD QL SMEAR: ABNORMAL
POTASSIUM SERPL-SCNC: 3.8 MMOL/L
PROT SERPL-MCNC: 7 G/DL
RBC # BLD AUTO: 2.72 M/UL
REASON FOR REFERRAL (NARRATIVE): NORMAL
REF LAB TEST METHOD: NORMAL
SCHISTOCYTES BLD QL SMEAR: ABNORMAL
SODIUM SERPL-SCNC: 138 MMOL/L
SPECIMEN SOURCE: NORMAL
SPECIMEN: NORMAL
STOMATOCYTES BLD QL SMEAR: ABNORMAL
TARGETS BLD QL SMEAR: ABNORMAL
WBC # BLD AUTO: 8.18 K/UL

## 2018-01-04 PROCEDURE — 63600175 PHARM REV CODE 636 W HCPCS: Performed by: INTERNAL MEDICINE

## 2018-01-04 PROCEDURE — 83735 ASSAY OF MAGNESIUM: CPT

## 2018-01-04 PROCEDURE — 86850 RBC ANTIBODY SCREEN: CPT

## 2018-01-04 PROCEDURE — 63600175 PHARM REV CODE 636 W HCPCS: Performed by: NURSE PRACTITIONER

## 2018-01-04 PROCEDURE — 80053 COMPREHEN METABOLIC PANEL: CPT

## 2018-01-04 PROCEDURE — 25000003 PHARM REV CODE 250: Performed by: NURSE PRACTITIONER

## 2018-01-04 PROCEDURE — 84100 ASSAY OF PHOSPHORUS: CPT

## 2018-01-04 PROCEDURE — 99233 SBSQ HOSP IP/OBS HIGH 50: CPT | Mod: ,,, | Performed by: INTERNAL MEDICINE

## 2018-01-04 PROCEDURE — 85025 COMPLETE CBC W/AUTO DIFF WBC: CPT

## 2018-01-04 RX ORDER — CYCLOBENZAPRINE HCL 5 MG
5 TABLET ORAL 3 TIMES DAILY PRN
Qty: 30 TABLET | Refills: 2 | Status: ON HOLD | OUTPATIENT
Start: 2018-01-04 | End: 2018-01-23 | Stop reason: HOSPADM

## 2018-01-04 RX ORDER — MORPHINE SULFATE 15 MG/1
15 TABLET ORAL EVERY 6 HOURS PRN
Qty: 120 TABLET | Refills: 0 | Status: SHIPPED | OUTPATIENT
Start: 2018-01-04 | End: 2018-01-04 | Stop reason: CLARIF

## 2018-01-04 RX ORDER — PREDNISONE 10 MG/1
75 TABLET ORAL DAILY
Qty: 75 TABLET | Refills: 0 | Status: ON HOLD | OUTPATIENT
Start: 2018-01-05 | End: 2018-01-23 | Stop reason: HOSPADM

## 2018-01-04 RX ORDER — MORPHINE SULFATE 15 MG/1
15 TABLET ORAL EVERY 6 HOURS PRN
Qty: 120 TABLET | Refills: 0 | Status: ON HOLD | OUTPATIENT
Start: 2018-01-04 | End: 2018-01-23

## 2018-01-04 RX ADMIN — FINASTERIDE 5 MG: 5 TABLET, FILM COATED ORAL at 12:01

## 2018-01-04 RX ADMIN — CARVEDILOL 6.25 MG: 6.25 TABLET, FILM COATED ORAL at 08:01

## 2018-01-04 RX ADMIN — GALANTAMINE HYDROBROMIDE 16 MG: 4 TABLET, FILM COATED ORAL at 08:01

## 2018-01-04 RX ADMIN — HYDROCODONE BITARTRATE AND ACETAMINOPHEN 2 TABLET: 7.5; 325 TABLET ORAL at 12:01

## 2018-01-04 RX ADMIN — PREDNISONE 75 MG: 5 TABLET ORAL at 08:01

## 2018-01-04 RX ADMIN — HYDROMORPHONE HYDROCHLORIDE 1 MG: 2 INJECTION INTRAMUSCULAR; INTRAVENOUS; SUBCUTANEOUS at 07:01

## 2018-01-04 RX ADMIN — FLUTICASONE FUROATE AND VILANTEROL TRIFENATATE 1 PUFF: 100; 25 POWDER RESPIRATORY (INHALATION) at 08:01

## 2018-01-04 RX ADMIN — FUROSEMIDE 20 MG: 20 TABLET ORAL at 08:01

## 2018-01-04 RX ADMIN — GABAPENTIN 300 MG: 300 CAPSULE ORAL at 02:01

## 2018-01-04 RX ADMIN — MEMANTINE HYDROCHLORIDE 5 MG: 5 TABLET ORAL at 08:01

## 2018-01-04 RX ADMIN — HYDROMORPHONE HYDROCHLORIDE 1 MG: 2 INJECTION INTRAMUSCULAR; INTRAVENOUS; SUBCUTANEOUS at 04:01

## 2018-01-04 RX ADMIN — LISINOPRIL 10 MG: 10 TABLET ORAL at 08:01

## 2018-01-04 RX ADMIN — DIAZEPAM 5 MG: 5 TABLET ORAL at 10:01

## 2018-01-04 RX ADMIN — HYDROCODONE BITARTRATE AND ACETAMINOPHEN 2 TABLET: 7.5; 325 TABLET ORAL at 04:01

## 2018-01-04 RX ADMIN — FAMOTIDINE 20 MG: 20 TABLET, FILM COATED ORAL at 08:01

## 2018-01-04 RX ADMIN — GABAPENTIN 300 MG: 300 CAPSULE ORAL at 04:01

## 2018-01-04 NOTE — PLAN OF CARE
Problem: Patient Care Overview  Goal: Plan of Care Review  Pt is AA but confused at times. Vital signs have been stable. Pt complained of pain, PRN medications given as ordered.  Call light and personal belongings within reach. Pt get up to bedside commode with assistance. Wife at bedside. Plan of care reviewed with pt and spouse. All questions and concerns were addressed. Will continue to monitor

## 2018-01-04 NOTE — SUBJECTIVE & OBJECTIVE
Subjective:     Interval History:   - Still awaiting results of skin lesion biopsy and bone marrow biopsy (flow negative). Path lab states there is a delay due to staining instrumental issues.  -  Awake and alert, but confused at times.  - Speech saw pt yesterday and recs are regular diet with nectar thick liquids with aspiration precautions   - Will discharge with home health today for PT/OT/Speech    Objective:     Vital Signs (Most Recent):  Temp: 98.7 °F (37.1 °C) (01/04/18 0740)  Pulse: 108 (01/04/18 0740)  Resp: 16 (01/04/18 0740)  BP: (!) 114/59 (01/04/18 0740)  SpO2: (!) 94 % (01/04/18 0740) Vital Signs (24h Range):  Temp:  [97.4 °F (36.3 °C)-98.8 °F (37.1 °C)] 98.7 °F (37.1 °C)  Pulse:  [] 108  Resp:  [16-18] 16  SpO2:  [93 %-97 %] 94 %  BP: ()/(56-62) 114/59     Weight: 98.6 kg (217 lb 6 oz)  Body mass index is 28.68 kg/m².  Body surface area is 2.25 meters squared.    ECOG SCORE           Intake/Output - Last 3 Shifts       01/02 0700 - 01/03 0659 01/03 0700 - 01/04 0659 01/04 0700 - 01/05 0659    P.O. 120 390     Total Intake(mL/kg) 120 (1.2) 390 (4)     Net +120 +390             Urine Occurrence 1 x 4 x     Stool Occurrence 1 x 2 x           Physical Exam   Constitutional: He is oriented to person, place, and time. He appears well-developed and well-nourished. He appears lethargic. He is cooperative.   HENT:   Head: Normocephalic and atraumatic.   Eyes: Lids are normal.   Neck: Trachea normal and normal range of motion. Carotid bruit is not present.   Cardiovascular: Regular rhythm and normal heart sounds.  Tachycardia present.    tachycardic   Pulmonary/Chest: Effort normal and breath sounds normal.   Abdominal: Normal appearance and bowel sounds are normal.   Musculoskeletal: Normal range of motion.   BLE weakness, unable to ambulate without total assit   Lymphadenopathy:   None palpable    Neurological: He is oriented to person, place, and time. He appears lethargic. Abnormal gait:  Unable to ambulate without total assist.   Skin: Skin is dry. No pallor.        Numerous vesicular type lesions. See graphical documentation for some areas where lesions were found. Derm saw pt and skin biopsy was done   Psychiatric: He has a normal mood and affect. His speech is normal and behavior is normal. Judgment and thought content normal. Delayed: slow to respond. Cognition and memory are impaired. Depressed: somewhat flat affect.   Nursing note and vitals reviewed.      Significant Labs:   CBC:     Recent Labs  Lab 01/03/18  0407 01/04/18  0448   WBC 7.24 8.18   HGB 8.8* 8.6*   HCT 25.8* 26.2*   PLT 27*  --     and CMP:     Recent Labs  Lab 01/03/18  0407 01/04/18  0448    138   K 3.8 3.8    103   CO2 25 29   * 90   BUN 24* 29*   CREATININE 0.9 0.9   CALCIUM 9.2 9.3   PROT 7.2 7.0   ALBUMIN 2.2* 2.1*   BILITOT 0.4 0.4   ALKPHOS 175* 163*   AST 21 14   ALT 16 14   ANIONGAP 11 6*   EGFRNONAA >60.0 >60.0       Diagnostic Results:  None

## 2018-01-04 NOTE — PLAN OF CARE
Problem: Patient Care Overview  Goal: Plan of Care Review  Outcome: Outcome(s) achieved Date Met: 01/04/18  Patient's VSS for shift. Patient is oriented, but has flat affect and delayed responses. States pain adequately controlled with PRN meds. States it is a generalized pain. Aspiration precautions maintained. Wife in full compliance. Also helps to thicken liquids. Patient can ambulate with a one person assist. Appetite remains poor. Patient encouraged to drink more fluids. Reviewed discharge instructions with patient and spouse. Free from falls, injury, and further skin breakdown.

## 2018-01-04 NOTE — PROGRESS NOTES
[facility] confirmed they received referral and start of care will be 1/5/18.

## 2018-01-04 NOTE — DISCHARGE SUMMARY
[facility]  Hematology  Bone Marrow Transplant  Discharge Summary      Patient Name: Lane Nunez Jr.  MRN: [id number]  Admission Date: 12/28/2017  Hospital Length of Stay: 7 days  Discharge Date and Time:  01/04/2018 3:26 PM  Attending Physician: Yulissa Pisano MD   Discharging Provider: Ivonne Curtis NP  Primary Care Provider: Chuy Oconnell MD    HPI: Pt admitted from bmt clinic after seeing Dr. Idris Bernabe for expedited work up. Follows with Dr. Patricia in [place], [place]. Hx of multiple myeloma with auto SCT 3 years ago. Now with possible concerns for lymphoma (cutaneous?). Denies SOB, chest pain, swelling. With weight loss and fatigue, as well as fevers/chills. With numerous vesicular lesions on trunk, arms, head, above groin (started about 1.5 months ago). Previously had skin bx 11/2017. Reported to show CD30+ lymphoproliferative disorder. Will need bm bx, repeat skin bx, skeletal survey, and to start prednisone 1 mg/kg/day after biopsies are performed. States he has been off revlimid for 3-4 weeks due to thrombocytopenia. See oncologic hx    * No surgery found *     Hospital Course: 12/28/2017: Pt admitted from bmt clinic after seeing Dr. Idris Bernabe for expedited work up. Follows with Dr. Patricia in [place], [place]. Hx of multiple myeloma with auto SCT 3 years ago. Now with possible concerns for lymphoma (cutaneous?). Denies SOB, chest pain, swelling. With weight loss and fatigue, as well as fevers/chills. With numerous vesicular lesions on trunk, arms, head, above groin (started about 1.5 months ago). Previously had skin bx 11/2017. Reported to show CD30+ lymphoproliferative disorder. Will need bm bx, repeat skin bx, skeletal survey, and to start prednisone 1 mg/kg/day after biopsies are performed. States he has been off revlimid for 3-4 weeks due to thrombocytopenia.  12/29/2017: Skeletal survey without evidence of metastatic disease, skin bx and BM bx both pending. Started on steroids pred 100  mg/day. No issues overnight  1/2/18: NEON, VSS  1/3/18: NEON, VSS, awaiting bone marrow and skin biopsy results  01/04/2018: Still awaiting skin bx and BM bx results (flow negative), path lab states there is a delay due to staining instrumental issues. Awake and alert, but confused at times. Speech saw pt yesterday and recs are regular diet with nectar thick liquids with aspiration precautions. Will discharge with home health for PT/OT/Speech. Medications sent to preferred pharmacy. Pt to f/u with Dr. Idris Bernabe in BMT clinic who will adjust steroid taper. Currently down to prednisone 75 mg daily.     Consults         Status Ordering Provider     Inpatient consult to Dermatology  Once     Provider:  (Not yet assigned)    Completed ROSANGELA FERRER     IP consult to case management  Once     Provider:  (Not yet assigned)    Completed FREDDY BUTLER          Significant Diagnostic Studies: Labs:   CMP   Recent Labs  Lab 01/03/18  0407 01/04/18  0448    138   K 3.8 3.8    103   CO2 25 29   * 90   BUN 24* 29*   CREATININE 0.9 0.9   CALCIUM 9.2 9.3   PROT 7.2 7.0   ALBUMIN 2.2* 2.1*   BILITOT 0.4 0.4   ALKPHOS 175* 163*   AST 21 14   ALT 16 14   ANIONGAP 11 6*   ESTGFRAFRICA >60.0 >60.0   EGFRNONAA >60.0 >60.0    and CBC   Recent Labs  Lab 01/03/18  0407 01/04/18  0448   WBC 7.24 8.18   HGB 8.8* 8.6*   HCT 25.8* 26.2*   PLT 27* 25*       Pending Diagnostic Studies:     Procedure Component Value Units Date/Time    Tissue Specimen to Pathology, Bone Marrow Aspiration/Biopsy Procedure [[id number]] Collected:  12/28/17 1508    Order Status:  Sent Lab Status:  In process Updated:  12/29/17 1103    Specimen:  Bone Marrow from Bone Marrow Aspirate, Left Iliac Crest         Final Active Diagnoses:    Diagnosis Date Noted POA    PRINCIPAL PROBLEM:  Neoplasm of uncertain behavior of skin [D48.5] 12/28/2017 Yes    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 Yes    History of peripheral stem cell transplant  [Z94.84] 06/06/2014 Not Applicable    Multiple myeloma in remission [C90.01] 04/17/2017 Yes    Cytopenia [D75.9] 06/27/2014 Yes    Dysphagia [R13.10] 12/28/2017 Yes    Neuropathy [G62.9] 12/28/2017 Yes    Glaucoma [H40.9] 12/28/2017 Yes    Cardiomyopathy [I42.9] 06/11/2014 Yes    Hypertension [I10] 06/11/2014 Yes    Depression [F32.9] 11/15/2013 Yes    Memory impairment [R41.3] 11/15/2013 Yes    BPH (benign prostatic hyperplasia) [N40.0] 11/15/2013 Yes      Problems Resolved During this Admission:    Diagnosis Date Noted Date Resolved POA      Discharged Condition: fair    Disposition: Home-Health Care [facility]    Follow Up:  Follow-up Information     [facility] On 1/12/2018.    Specialty:  Lab  Why:  Labs- 1:20pm  Contact information:  [address] [place] [id number] [phone]  Additional information:  [facility] 3rd Floor           Idris Bernabe MD On 1/12/2018.    Specialty:  Hematology and Oncology  Why:  follow up- 2:20pm  Contact information:  [address] [place] [id number]  [phone]                 Patient Instructions:     Activity as tolerated   Order Comments: Per PT/OT     Notify your health care provider if you experience any of the following:  increased confusion or weakness     Notify your health care provider if you experience any of the following:  persistent dizziness, light-headedness, or visual disturbances     Notify your health care provider if you experience any of the following:  worsening rash     Notify your health care provider if you experience any of the following:  severe persistent headache     Notify your health care provider if you experience any of the following:  difficulty breathing or increased cough     Notify your health care provider if you experience any of the following:  redness, tenderness, or signs of infection (pain, swelling, redness, odor or green/yellow discharge around incision site)      Notify your health care provider if you experience any of the following:  severe uncontrolled pain     Notify your health care provider if you experience any of the following:  persistent nausea and vomiting or diarrhea     Notify your health care provider if you experience any of the following:  temperature >100.4       Medications:  Reconciled Home Medications:   Current Discharge Medication List      START taking these medications    Details   cyclobenzaprine (FLEXERIL) 5 MG tablet Take 1 tablet (5 mg total) by mouth 3 (three) times daily as needed for Muscle spasms.  Qty: 30 tablet, Refills: 2    Associated Diagnoses: Muscle spasm      morphine (MSIR) 15 MG tablet Take 1 tablet (15 mg total) by mouth every 6 (six) hours as needed for Pain.  Qty: 120 tablet, Refills: 0    Associated Diagnoses: Lymphoproliferative disorder; T-cell lymphoma; Multiple myeloma in remission      predniSONE (DELTASONE) 10 MG tablet Take 7.5 tablets (75 mg total) by mouth once daily.  Qty: 75 tablet, Refills: 0         CONTINUE these medications which have NOT CHANGED    Details   amitriptyline (ELAVIL) 50 MG tablet Take 50 mg by mouth every evening.      aspirin (ECOTRIN) 81 MG EC tablet Take 81 mg by mouth every morning.       carvedilol (COREG) 3.125 MG tablet Take 6.25 mg by mouth 2 (two) times daily.      cholecalciferol, vitamin D3, (VITAMIN D3) 2,000 unit Cap Take 1 capsule by mouth after lunch.       diazePAM (VALIUM) 5 MG tablet Take 1 tablet (5 mg total) by mouth every 6 (six) hours as needed for Anxiety.  Qty: 60 tablet, Refills: 0    Associated Diagnoses: Multiple myeloma in remission      finasteride (PROSCAR) 5 mg tablet Take 5 mg by mouth after lunch.       furosemide (LASIX) 20 MG tablet Take 1 tablet (20 mg total) by mouth once daily.  Qty: 30 tablet, Refills: 3    Associated Diagnoses: Multiple myeloma, remission status unspecified      gabapentin (NEURONTIN) 300 MG capsule Take 300 mg by mouth 3 (three) times  daily.      galantamine (RAZADYNE) 8 MG Tab Take 16 mg by mouth 2 (two) times daily with meals.       memantine (NAMENDA) 5 MG Tab Take 5 mg by mouth 2 (two) times daily.      MULTIVIT &MINERALS/FERROUS FUM (MULTI VITAMIN ORAL) Take by mouth.      naproxen (EC NAPROSYN) 500 MG EC tablet Refills: 2      olanzapine (ZYPREXA) 15 MG Tab Take 7.5 mg by mouth nightly.      ranitidine (ZANTAC) 150 MG capsule Take 150 mg by mouth 2 (two) times daily.      TRAVATAN Z 0.004 % Drop Place 1 drop into both eyes nightly.  Refills: 4      venlafaxine 150 mg TR24 Take 150 mg by mouth once daily.      vitamin E 1000 UNIT capsule Take 1,000 Units by mouth after lunch.       budesonide-formoterol 160-4.5 mcg (SYMBICORT) 160-4.5 mcg/actuation HFAA Inhale 2 puffs into the lungs every 12 (twelve) hours. Controller      lisinopril (PRINIVIL,ZESTRIL) 5 MG tablet Take 10 mg by mouth once daily.       potassium chloride (KLOR-CON) 10 MEQ TbSR Take 1 tablet (10 mEq total) by mouth once daily.  Qty: 30 tablet, Refills: 3    Associated Diagnoses: Multiple myeloma, remission status unspecified      potassium chloride SA (K-DUR,KLOR-CON) 10 MEQ tablet Take 10 mEq by mouth once daily.  Refills: 3         STOP taking these medications       dexamethasone (DECADRON) 4 MG Tab Comments:   Reason for Stopping:         hydrocodone-acetaminophen 7.5-325mg (NORCO) 7.5-325 mg per tablet Comments:   Reason for Stopping:         REVLIMID 10 mg Cap Comments:   Reason for Stopping:         timolol maleate 0.5% (TIMOPTIC-XE) 0.5 % SolG Comments:   Reason for Stopping:               Ivonne Curtis NP  Bone Marrow Transplant  [facility]

## 2018-01-04 NOTE — PROGRESS NOTES
Per MD pt anticipated to be stable for DC today. Family requested [facility] for [facility] services. YENNY spoke to Yesy in intake at [facility] phone [phone], fax [phone], she requested referral be faxed to her for review. YENNY faxed referral, awaiting return call.

## 2018-01-04 NOTE — PT/OT/SLP PROGRESS
Occupational Therapy      Patient Name:  Lane Nunez Jr.   MRN:  [id number]    Patient not seen today secondary to patient being assisted to shower by RN and spouse.  Attempted and offered assist but was declined  . Will follow-up as able.    Tigist Newby, OT  1/4/2018

## 2018-01-04 NOTE — PLAN OF CARE
MDR's with Dr Pisano.  Patient reports feeling well.  Planning to d/c home today with the support of his wife.  SW arranging HH needs.  Message sent to the oncology clinic to schedule f/u in 1 week.  The patient and his wife agree with the d/c plan.    Future Appointments  Date Time Provider Department Center   1/12/2018 1:20 PM LAB, HEMONC CANCER BLDG [facility] LAB HO Noel [place]   1/12/2018 2:20 PM Idris Bernabe MD [facility] BM QUIROGA [place]   2/27/2018 12:30 PM [facility] XR1 [facility] XRAY [place]   2/27/2018 1:00 PM LAB, [facility] LUNA BIRD [facility] MB LAB [place]   2/27/2018 1:30 PM Luis Miguel Patricia MD [facility] HE OC [place]   2/27/2018 2:00 PM CHAIR 03 TG SHORT [facility] MB SAWYER [place]          01/04/18 1325   Final Note   Assessment Type Final Discharge Note   Discharge Disposition Home-Health   What phone number can be called within the next 1-3 days to see how you are doing after discharge? ([phone])   Hospital Follow Up  Appt(s) scheduled? Yes   Discharge plans and expectations educations in teach back method with documentation complete? Yes

## 2018-01-04 NOTE — ASSESSMENT & PLAN NOTE
- continue home gabapentin and upon discharge will change norco to MSIR to achieve better pain control   - unable to walk without total assist on admit, slowly improving   - PT/OT recs are home health

## 2018-01-04 NOTE — ASSESSMENT & PLAN NOTE
See oncologic hx on H&P for more information  - had silva auto 6/25/14   - was in CR at 1 year  - hx of plasmacytomas prior to  - maintenance Revlimid on hold for 4 weeks due to thrombocytopenia. Will continue to hold at this time  - now with BLE weakness, inability to walk--PT/OT ordered and recs are home health  - BM bx performed 12/28/17, results are pending  - recent SPEP with normal protein, FLC from 12/28 with kappa light chain of 4.3, ratio of 1.40

## 2018-01-04 NOTE — ASSESSMENT & PLAN NOTE
- Anemia and thrombocytopenia likely 2/2 disease  - Revlimid has been on hold due to thrombocytopenia  - hemoglobin is 8.6 g/dL. Platelet count is 25 k/uL. WBC normal  - Transfuse for hgb <7 g/dL, plt <10 k/uL

## 2018-01-04 NOTE — ASSESSMENT & PLAN NOTE
- Chronic, possibly secondary to dementia  - He has ongoing swallowing problems  - Speech saw pt, continue regular diet with nectar thick liquids and aspiration precautions  - He has refused a G-tube on multiple occasions in the past per chart  - continue to monitor

## 2018-01-04 NOTE — PLAN OF CARE
[facility]    HOME HEALTH ORDERS  FACE TO FACE ENCOUNTER    Patient Name: Lane Nunez Jr.  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       Future Appointments  Date Time Provider Department Center   1/12/2018 1:20 PM LAB, HEMONC CANCER BLDG [facility] LAB [place]   1/12/2018 2:20 PM Idris Bernabe MD [facility] BM QUIROGA [place]   2/27/2018 12:30 PM [facility] XR1 [facility] XRAY [place]   2/27/2018 1:00 PM LAB, [facility] LUNA BIRD [facility] MB LAB [place]   2/27/2018 1:30 PM Luis Miguel Patricia MD [facility] HE OC [place]   2/27/2018 2:00 PM CHAIR 03 [facility] SHORT [facility] MB SAWYER [place]           I have seen and examined this patient face to face today. My clinical findings that support the need for the home health skilled services and home bound status are the following:  Weakness/numbness causing balance and gait disturbance due to Weakness/Debility, Anemia and Malignancy/Cancer making it taxing to leave home.  Requiring assistive device to leave home due to unsteady gait caused by   Weakness/Debility, Anemia and Malignancy/Cancer.    Allergies:  Review of patient's allergies indicates:   Allergen Reactions    Keflex [cephalexin] Rash       Diet: regular diet with nectar thick liquids    Activities: activity as tolerated per PT and OT    Nursing:   SN to complete comprehensive assessment including routine vital signs. Instruct on disease process and s/s of complications to report to MD. Review/verify medication list sent home with the patient at time of discharge  and instruct patient/caregiver as needed. Frequency may be adjusted depending on start of care date.    Notify MD if SBP > 160 or < 90; DBP > 90 or < 50; HR > 120 or < 50; Temp > 101        CONSULTS:    Physical Therapy to evaluate and treat. Evaluate for home safety and equipment needs; Establish/upgrade home exercise program. Perform / instruct on therapeutic exercises, gait training, transfer training, and Range of Motion.  Occupational Therapy to evaluate and treat. Evaluate home environment for safety and equipment needs. Perform/Instruct on transfers, ADL training, ROM, and therapeutic exercises.  Speech Therapy  to evaluate and treat for  Swallowing.    MISCELLANEOUS CARE:  N/A    WOUND CARE ORDERS  n/a    Medications: Review discharge medications with patient and family and provide education.      Current Discharge Medication List      START taking these medications    Details   cyclobenzaprine (FLEXERIL) 5 MG tablet Take 1 tablet (5 mg total) by mouth 3 (three) times daily as needed for Muscle spasms.  Qty: 30 tablet, Refills: 2    Associated Diagnoses: Muscle spasm      morphine (MSIR) 15 MG tablet Take 1 tablet (15 mg total) by mouth every 6 (six) hours as needed for Pain.  Qty: 120 tablet, Refills: 0    Associated Diagnoses: Lymphoproliferative disorder; T-cell lymphoma; Multiple myeloma in remission      predniSONE (DELTASONE) 10 MG tablet Take 7.5 tablets (75 mg total) by mouth once daily.  Qty: 75 tablet, Refills: 0          CONTINUE these medications which have NOT CHANGED    Details   amitriptyline (ELAVIL) 50 MG tablet Take 50 mg by mouth every evening.      aspirin (ECOTRIN) 81 MG EC tablet Take 81 mg by mouth every morning.       carvedilol (COREG) 3.125 MG tablet Take 6.25 mg by mouth 2 (two) times daily.      cholecalciferol, vitamin D3, (VITAMIN D3) 2,000 unit Cap Take 1 capsule by mouth after lunch.       diazePAM (VALIUM) 5 MG tablet Take 1 tablet (5 mg total) by mouth every 6 (six) hours as needed for Anxiety.  Qty: 60 tablet, Refills: 0    Associated Diagnoses: Multiple myeloma in remission      finasteride (PROSCAR) 5 mg tablet Take 5 mg by mouth after lunch.       furosemide (LASIX) 20 MG tablet Take 1 tablet (20 mg total) by mouth once daily.  Qty: 30 tablet, Refills: 3    Associated Diagnoses: Multiple myeloma, remission status unspecified      gabapentin (NEURONTIN) 300 MG capsule Take 300 mg by mouth 3 (three) times daily.      galantamine (RAZADYNE) 8 MG Tab Take 16 mg by mouth 2 (two) times daily with meals.       memantine (NAMENDA) 5 MG Tab Take 5 mg by mouth 2 (two) times daily.      MULTIVIT &MINERALS/FERROUS FUM (MULTI VITAMIN ORAL) Take by mouth.      naproxen (EC NAPROSYN) 500 MG EC tablet Refills: 2      olanzapine (ZYPREXA) 15 MG Tab Take 7.5 mg by mouth nightly.      ranitidine (ZANTAC) 150 MG capsule Take 150 mg by mouth 2 (two) times daily.      TRAVATAN Z 0.004 % Drop Place 1 drop into both eyes nightly.  Refills: 4      venlafaxine 150 mg TR24 Take 150 mg by mouth once daily.      vitamin E 1000 UNIT capsule Take 1,000 Units by mouth after lunch.       budesonide-formoterol 160-4.5 mcg (SYMBICORT) 160-4.5 mcg/actuation HFAA Inhale 2 puffs into the lungs every 12 (twelve) hours. Controller      lisinopril (PRINIVIL,ZESTRIL) 5 MG tablet Take 10 mg by mouth once daily.       potassium chloride (KLOR-CON) 10 MEQ TbSR Take 1 tablet (10 mEq total) by mouth once daily.  Qty: 30 tablet, Refills: 3     Associated Diagnoses: Multiple myeloma, remission status unspecified      potassium chloride SA (K-DUR,KLOR-CON) 10 MEQ tablet Take 10 mEq by mouth once daily.  Refills: 3         STOP taking these medications       dexamethasone (DECADRON) 4 MG Tab Comments:   Reason for Stopping:         hydrocodone-acetaminophen 7.5-325mg (NORCO) 7.5-325 mg per tablet Comments:   Reason for Stopping:         REVLIMID 10 mg Cap Comments:   Reason for Stopping:         timolol maleate 0.5% (TIMOPTIC-XE) 0.5 % SolG Comments:   Reason for Stopping:               I certify that this patient is confined to his home and needs intermittent skilled nursing care, physical therapy, speech therapy and occupational therapy.

## 2018-01-04 NOTE — PROGRESS NOTES
[facility]  Hematology  Bone Marrow Transplant  Progress Note    Patient Name: Lane Nunez Jr.  Admission Date: 12/28/2017  Hospital Length of Stay: 7 days  Code Status: Full Code    Subjective:     Interval History:   - Still awaiting results of skin lesion biopsy and bone marrow biopsy (flow negative). Path lab states there is a delay due to staining instrumental issues.  -  Awake and alert, but confused at times.  - Speech saw pt yesterday and recs are regular diet with nectar thick liquids with aspiration precautions   - Will discharge with home health today for PT/OT/Speech    Objective:     Vital Signs (Most Recent):  Temp: 98.7 °F (37.1 °C) (01/04/18 0740)  Pulse: 108 (01/04/18 0740)  Resp: 16 (01/04/18 0740)  BP: (!) 114/59 (01/04/18 0740)  SpO2: (!) 94 % (01/04/18 0740) Vital Signs (24h Range):  Temp:  [97.4 °F (36.3 °C)-98.8 °F (37.1 °C)] 98.7 °F (37.1 °C)  Pulse:  [] 108  Resp:  [16-18] 16  SpO2:  [93 %-97 %] 94 %  BP: ()/(56-62) 114/59     Weight: 98.6 kg (217 lb 6 oz)  Body mass index is 28.68 kg/m².  Body surface area is 2.25 meters squared.    ECOG SCORE           Intake/Output - Last 3 Shifts       01/02 0700 - 01/03 0659 01/03 0700 - 01/04 0659 01/04 0700 - 01/05 0659    P.O. 120 390     Total Intake(mL/kg) 120 (1.2) 390 (4)     Net +120 +390             Urine Occurrence 1 x 4 x     Stool Occurrence 1 x 2 x           Physical Exam   Constitutional: He is oriented to person, place, and time. He appears well-developed and well-nourished. He appears lethargic. He is cooperative.   HENT:   Head: Normocephalic and atraumatic.   Eyes: Lids are normal.   Neck: Trachea normal and normal range of motion. Carotid bruit is not present.   Cardiovascular: Regular rhythm and normal heart sounds.  Tachycardia present.    tachycardic   Pulmonary/Chest: Effort normal and breath sounds normal.   Abdominal: Normal appearance and bowel sounds are normal.   Musculoskeletal: Normal range  of motion.   BLE weakness, unable to ambulate without total assit   Lymphadenopathy:   None palpable    Neurological: He is oriented to person, place, and time. He appears lethargic. Abnormal gait: Unable to ambulate without total assist.   Skin: Skin is dry. No pallor.        Numerous vesicular type lesions. See graphical documentation for some areas where lesions were found. Derm saw pt and skin biopsy was done   Psychiatric: He has a normal mood and affect. His speech is normal and behavior is normal. Judgment and thought content normal. Delayed: slow to respond. Cognition and memory are impaired. Depressed: somewhat flat affect.   Nursing note and vitals reviewed.      Significant Labs:   CBC:     Recent Labs  Lab 01/03/18  0407 01/04/18  0448   WBC 7.24 8.18   HGB 8.8* 8.6*   HCT 25.8* 26.2*   PLT 27*  --     and CMP:     Recent Labs  Lab 01/03/18  0407 01/04/18  0448    138   K 3.8 3.8    103   CO2 25 29   * 90   BUN 24* 29*   CREATININE 0.9 0.9   CALCIUM 9.2 9.3   PROT 7.2 7.0   ALBUMIN 2.2* 2.1*   BILITOT 0.4 0.4   ALKPHOS 175* 163*   AST 21 14   ALT 16 14   ANIONGAP 11 6*   EGFRNONAA >60.0 >60.0       Diagnostic Results:  None    Assessment/Plan:     * Neoplasm of uncertain behavior of skin    - with multiple lesions as demonstrated in graphical documentation on physical exam, first seen about 1.5 months ago  - previously bx'd at outside hospital 11/2017, unsure which facility per family, no records. Was reported by Dr. Patricia to be CD30+ lymphoproliferative disorder   - derm consulted for additional skin bx, appreciate bx and recs  - pending results from skin bx on 12/28/17          T-cell lymphoma    - possible dx due to previous skin bx from 11/2017 reported to Dr. Patricia as CD30+ lymphoproliferative disorder  - repeat skin bx done by derm 12/28/17, pending results  - BM bx performed 12/28/17 with flow, cyto, dna/rna hold, and t cell gene rearrangement (negative, but not predictive of  anything, was ordered in error), pending path results   - skeletal survey ordered 12/28/17, did not show metastatic disease  - CT chest/abdomen from 12/29 with multiple mediastinal and hilar LN and subcutaneous and cutaneous nodules noted to abdominal wall  - started on prednisone 1 mg/kg/day on 12/29/17 (100 mg daily). Decreased to 75 mg daily on 1/3/18. Primary oncologist (Dr. Idris Bernabe) to continue to taper outpatient  - patient may have a biphenotypic process. Continue to monitor.        History of peripheral stem cell transplant    - had silva auto SCT 6/25/14 for myeloma, 3.5 years post transplant  - 1 year restaging marrow on 7/24/15 showed 40% cellularity with trilineage hematopoiesis and no morphologic evidence of plasma cells.  Cytogenetics could not be done as the metaphase cells did not grow.   - PET CT did not reveal any evidence of myeloma at 1 year   - skeletal survey 12/28/17 was negative  - maintenance Revlimid on hold         Multiple myeloma in remission    See oncologic hx on H&P for more information  - had silva auto 6/25/14   - was in CR at 1 year  - hx of plasmacytomas prior to  - maintenance Revlimid on hold for 4 weeks due to thrombocytopenia. Will continue to hold at this time  - now with BLE weakness, inability to walk--PT/OT ordered and recs are home health  - BM bx performed 12/28/17, results are pending  - recent SPEP with normal protein, FLC from 12/28 with kappa light chain of 4.3, ratio of 1.40         Cytopenia    - Anemia and thrombocytopenia likely 2/2 disease  - Revlimid has been on hold due to thrombocytopenia  - hemoglobin is 8.6 g/dL. Platelet count is 25 k/uL. WBC normal  - Transfuse for hgb <7 g/dL, plt <10 k/uL         Glaucoma    - Per spouse  - continue home eye drops           Neuropathy    - continue home gabapentin and upon discharge will change norco to MSIR to achieve better pain control   - unable to walk without total assist on admit, slowly improving   - PT/OT recs  are home health        Dysphagia    - Chronic, possibly secondary to dementia  - He has ongoing swallowing problems  - Speech saw pt, continue regular diet with nectar thick liquids and aspiration precautions  - He has refused a G-tube on multiple occasions in the past per chart  - continue to monitor        Cardiomyopathy    - daily weights, stable  - lasix 20 mg daily, will replace K if needed on a daily basis  - hx of decreased EF 30-40%          Hypertension    - blood pressure is within acceptable limits  - continue home coreg and lisinopril           BPH (benign prostatic hyperplasia)    - continue home finasteride           Memory impairment    - Per spouse, pt with demenia  - continue home memantine and galantamine          Depression    - no acute issus. Continue home amitriptyline, olanzapine             VTE Risk Mitigation         Ordered     High Risk of VTE  Once      12/28/17 1534     Place sequential compression device  Until discontinued      12/28/17 1534     Place DARIUS hose  Until discontinued      12/28/17 1534          Disposition: will discharge home today with home health for PT/OT/Speech    Ivonne Curtis NP  Bone Marrow Transplant  [facility]

## 2018-01-04 NOTE — ASSESSMENT & PLAN NOTE
- daily weights, stable  - lasix 20 mg daily, will replace K if needed on a daily basis  - hx of decreased EF 30-40%

## 2018-01-05 NOTE — PT/OT/SLP DISCHARGE
Physical Therapy Discharge Summary    Name: Lane Nunez Jr.  MRN: [id number]   Principal Problem: Neoplasm of uncertain behavior of skin     Patient Discharged from acute Physical Therapy on 18.  Please refer to prior PT noted date on 17 for functional status.     Assessment:     Patient appropriate for care in another setting.    Objective:     GOALS:    Physical Therapy Goals        Problem: Physical Therapy Goal    Goal Priority Disciplines Outcome Goal Variances Interventions   Physical Therapy Goal     PT/OT, PT Ongoing (interventions implemented as appropriate)     Description:  Goals to be met by: 18    Patient will increase functional independence with mobility by performin. Supine to sit with Stand-by Assistance  2. Sit to supine with Stand-by Assistance  3. Sit to stand transfer with Supervision  4. Bed to chair transfer with Supervision using Rolling Walker  5. Gait  x 150 feet with Supervision using Rolling Walker.   6. Ascend/descend 5 stair with left Handrails Contact Guard Assistance using Rolling Walker.                       Reasons for Discontinuation of Therapy Services  Transfer to alternate level of care.      Plan:     Patient Discharged to: Home with Home Health Service.    Phillip Reynoso III, PT  2018

## 2018-01-08 ENCOUNTER — TELEPHONE (OUTPATIENT)
Dept: DERMATOLOGY | Facility: HOSPITAL | Age: 68
End: 2018-01-08

## 2018-01-08 NOTE — TELEPHONE ENCOUNTER
Called to discuss pathology results from skin biopsy with patient. Biopsy with similar results of Cd30+ disorder. 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  Patient had no questions and [facility] [place] Will set up appointment there. Also with FU for hospitalization this Friday      1. SKIN, RIGHT UPPER BACK, EXCISIONAL BIOPSY:  --CD30-positive T-cell lymphoproliferative disorder, see comment.  COMMENT: The differential diagnosis includes lymphomatoid papulosis type C (LyP) vs. primary cutaneous  anaplastic large cell lymphoma (C-ALCL) vs. cutaneous involvement by a systemic ALK-1 negative ALCL. Clinical  correlation is required for this distinction.

## 2018-01-09 ENCOUNTER — TELEPHONE (OUTPATIENT)
Dept: DERMATOLOGY | Facility: HOSPITAL | Age: 68
End: 2018-01-09

## 2018-01-09 NOTE — TELEPHONE ENCOUNTER
Called to discuss that patient has appt at [facility] speciality clinic at 1:30 pm on 1/16/2018. Spoke with patients wife who stated they will make the appointment

## 2018-01-12 ENCOUNTER — HOSPITAL ENCOUNTER (INPATIENT)
Facility: HOSPITAL | Age: 68
LOS: 11 days | Discharge: NURSING, REHAB OR LONG-TERM CARE | DRG: 847 | End: 2018-01-23
Attending: INTERNAL MEDICINE | Admitting: INTERNAL MEDICINE
Payer: MEDICARE

## 2018-01-12 ENCOUNTER — OFFICE VISIT (OUTPATIENT)
Dept: HEMATOLOGY/ONCOLOGY | Facility: CLINIC | Age: 68
DRG: 847 | End: 2018-01-12
Payer: MEDICARE

## 2018-01-12 VITALS
OXYGEN SATURATION: 97 % | SYSTOLIC BLOOD PRESSURE: 135 MMHG | HEART RATE: 128 BPM | TEMPERATURE: 99 F | DIASTOLIC BLOOD PRESSURE: 73 MMHG | RESPIRATION RATE: 20 BRPM

## 2018-01-12 DIAGNOSIS — C84.78 ANAPLASTIC ALK-NEGATIVE LARGE CELL LYMPHOMA OF LYMPH NODES OF MULTIPLE REGIONS: ICD-10-CM

## 2018-01-12 DIAGNOSIS — R41.3 MEMORY IMPAIRMENT: ICD-10-CM

## 2018-01-12 DIAGNOSIS — C85.90 T-CELL LYMPHOMA: Primary | ICD-10-CM

## 2018-01-12 DIAGNOSIS — I42.9 CARDIOMYOPATHY, UNSPECIFIED TYPE: ICD-10-CM

## 2018-01-12 DIAGNOSIS — D63.0 ANEMIA IN NEOPLASTIC DISEASE: ICD-10-CM

## 2018-01-12 DIAGNOSIS — R53.81 DEBILITY: ICD-10-CM

## 2018-01-12 DIAGNOSIS — C90.01 MULTIPLE MYELOMA IN REMISSION: ICD-10-CM

## 2018-01-12 DIAGNOSIS — G62.9 NEUROPATHY: ICD-10-CM

## 2018-01-12 DIAGNOSIS — I95.1 ORTHOSTATIC HYPOTENSION: ICD-10-CM

## 2018-01-12 DIAGNOSIS — D69.6 THROMBOCYTOPENIA: ICD-10-CM

## 2018-01-12 DIAGNOSIS — Z94.84 HISTORY OF PERIPHERAL STEM CELL TRANSPLANT: ICD-10-CM

## 2018-01-12 DIAGNOSIS — K59.00 CONSTIPATION, UNSPECIFIED CONSTIPATION TYPE: ICD-10-CM

## 2018-01-12 DIAGNOSIS — R73.9 HYPERGLYCEMIA: ICD-10-CM

## 2018-01-12 DIAGNOSIS — R50.9 FEVER, UNSPECIFIED FEVER CAUSE: ICD-10-CM

## 2018-01-12 DIAGNOSIS — R07.9 CHEST PAIN: ICD-10-CM

## 2018-01-12 DIAGNOSIS — D47.9 LYMPHOPROLIFERATIVE DISORDER: ICD-10-CM

## 2018-01-12 DIAGNOSIS — N40.0 BENIGN PROSTATIC HYPERPLASIA, UNSPECIFIED WHETHER LOWER URINARY TRACT SYMPTOMS PRESENT: ICD-10-CM

## 2018-01-12 DIAGNOSIS — R00.0 TACHYCARDIA: ICD-10-CM

## 2018-01-12 DIAGNOSIS — C90.01 MULTIPLE MYELOMA IN REMISSION: Primary | ICD-10-CM

## 2018-01-12 LAB
ALBUMIN SERPL BCP-MCNC: 2.2 G/DL
ALP SERPL-CCNC: 220 U/L
ALT SERPL W/O P-5'-P-CCNC: 25 U/L
ANION GAP SERPL CALC-SCNC: 9 MMOL/L
APTT BLDCRRT: 22.7 SEC
AST SERPL-CCNC: 22 U/L
BASOPHILS # BLD AUTO: 0.01 K/UL
BASOPHILS NFR BLD: 0.1 %
BILIRUB SERPL-MCNC: 0.5 MG/DL
BUN SERPL-MCNC: 19 MG/DL
CALCIUM SERPL-MCNC: 9.5 MG/DL
CHLORIDE SERPL-SCNC: 102 MMOL/L
CO2 SERPL-SCNC: 26 MMOL/L
CREAT SERPL-MCNC: 1 MG/DL
D DIMER PPP IA.FEU-MCNC: 6.81 MG/L FEU
DIFFERENTIAL METHOD: ABNORMAL
EOSINOPHIL # BLD AUTO: 0 K/UL
EOSINOPHIL NFR BLD: 0 %
ERYTHROCYTE [DISTWIDTH] IN BLOOD BY AUTOMATED COUNT: 17.5 %
EST. GFR  (AFRICAN AMERICAN): >60 ML/MIN/1.73 M^2
EST. GFR  (NON AFRICAN AMERICAN): >60 ML/MIN/1.73 M^2
ESTIMATED AVG GLUCOSE: 128 MG/DL
FIBRINOGEN PPP-MCNC: 669 MG/DL
GLUCOSE SERPL-MCNC: 133 MG/DL
HAPTOGLOB SERPL-MCNC: 495 MG/DL
HBA1C MFR BLD HPLC: 6.1 %
HCT VFR BLD AUTO: 25.5 %
HGB BLD-MCNC: 8.4 G/DL
IMM GRANULOCYTES # BLD AUTO: 0.07 K/UL
IMM GRANULOCYTES NFR BLD AUTO: 0.8 %
INR PPP: 1
LACTATE SERPL-SCNC: 2 MMOL/L
LDH SERPL L TO P-CCNC: 1894 U/L
LYMPHOCYTES # BLD AUTO: 0.7 K/UL
LYMPHOCYTES NFR BLD: 8.2 %
MAGNESIUM SERPL-MCNC: 1.7 MG/DL
MCH RBC QN AUTO: 31.8 PG
MCHC RBC AUTO-ENTMCNC: 32.9 G/DL
MCV RBC AUTO: 97 FL
MONOCYTES # BLD AUTO: 0.5 K/UL
MONOCYTES NFR BLD: 5.5 %
NEUTROPHILS # BLD AUTO: 7.3 K/UL
NEUTROPHILS NFR BLD: 85.4 %
NRBC BLD-RTO: 1 /100 WBC
PHOSPHATE SERPL-MCNC: 3.3 MG/DL
PLATELET # BLD AUTO: 30 K/UL
PMV BLD AUTO: 10.4 FL
POTASSIUM SERPL-SCNC: 4.7 MMOL/L
PROT SERPL-MCNC: 6.9 G/DL
PROTHROMBIN TIME: 10.5 SEC
RBC # BLD AUTO: 2.64 M/UL
SODIUM SERPL-SCNC: 137 MMOL/L
T4 FREE SERPL-MCNC: 0.89 NG/DL
TSH SERPL DL<=0.005 MIU/L-ACNC: 0.28 UIU/ML
URATE SERPL-MCNC: 6.3 MG/DL
WBC # BLD AUTO: 8.52 K/UL

## 2018-01-12 PROCEDURE — 85379 FIBRIN DEGRADATION QUANT: CPT

## 2018-01-12 PROCEDURE — 83735 ASSAY OF MAGNESIUM: CPT

## 2018-01-12 PROCEDURE — 84439 ASSAY OF FREE THYROXINE: CPT

## 2018-01-12 PROCEDURE — 83010 ASSAY OF HAPTOGLOBIN QUANT: CPT

## 2018-01-12 PROCEDURE — 80074 ACUTE HEPATITIS PANEL: CPT

## 2018-01-12 PROCEDURE — 83036 HEMOGLOBIN GLYCOSYLATED A1C: CPT

## 2018-01-12 PROCEDURE — 87040 BLOOD CULTURE FOR BACTERIA: CPT | Mod: 59

## 2018-01-12 PROCEDURE — 25000003 PHARM REV CODE 250: Performed by: STUDENT IN AN ORGANIZED HEALTH CARE EDUCATION/TRAINING PROGRAM

## 2018-01-12 PROCEDURE — 20600001 HC STEP DOWN PRIVATE ROOM

## 2018-01-12 PROCEDURE — 25000003 PHARM REV CODE 250: Performed by: INTERNAL MEDICINE

## 2018-01-12 PROCEDURE — 80053 COMPREHEN METABOLIC PANEL: CPT

## 2018-01-12 PROCEDURE — 99999 PR PBB SHADOW E&M-EST. PATIENT-LVL III: CPT | Mod: PBBFAC,,, | Performed by: INTERNAL MEDICINE

## 2018-01-12 PROCEDURE — 84100 ASSAY OF PHOSPHORUS: CPT

## 2018-01-12 PROCEDURE — 85610 PROTHROMBIN TIME: CPT

## 2018-01-12 PROCEDURE — 85025 COMPLETE CBC W/AUTO DIFF WBC: CPT

## 2018-01-12 PROCEDURE — 63600175 PHARM REV CODE 636 W HCPCS: Mod: JG | Performed by: STUDENT IN AN ORGANIZED HEALTH CARE EDUCATION/TRAINING PROGRAM

## 2018-01-12 PROCEDURE — 85384 FIBRINOGEN ACTIVITY: CPT

## 2018-01-12 PROCEDURE — 99213 OFFICE O/P EST LOW 20 MIN: CPT | Mod: PBBFAC | Performed by: INTERNAL MEDICINE

## 2018-01-12 PROCEDURE — 99215 OFFICE O/P EST HI 40 MIN: CPT | Mod: S$PBB,,, | Performed by: INTERNAL MEDICINE

## 2018-01-12 PROCEDURE — 84443 ASSAY THYROID STIM HORMONE: CPT

## 2018-01-12 PROCEDURE — 84550 ASSAY OF BLOOD/URIC ACID: CPT

## 2018-01-12 PROCEDURE — 83615 LACTATE (LD) (LDH) ENZYME: CPT

## 2018-01-12 PROCEDURE — 83605 ASSAY OF LACTIC ACID: CPT

## 2018-01-12 PROCEDURE — 85730 THROMBOPLASTIN TIME PARTIAL: CPT

## 2018-01-12 RX ORDER — GLUCAGON 1 MG
1 KIT INJECTION
Status: DISCONTINUED | OUTPATIENT
Start: 2018-01-12 | End: 2018-01-23 | Stop reason: HOSPADM

## 2018-01-12 RX ORDER — SODIUM,POTASSIUM PHOSPHATES 280-250MG
2 POWDER IN PACKET (EA) ORAL
Status: DISCONTINUED | OUTPATIENT
Start: 2018-01-12 | End: 2018-01-23 | Stop reason: HOSPADM

## 2018-01-12 RX ORDER — POTASSIUM CHLORIDE 20 MEQ/15ML
40 SOLUTION ORAL
Status: DISCONTINUED | OUTPATIENT
Start: 2018-01-12 | End: 2018-01-23 | Stop reason: HOSPADM

## 2018-01-12 RX ORDER — MORPHINE SULFATE 15 MG/1
15 TABLET ORAL EVERY 6 HOURS PRN
Status: DISCONTINUED | OUTPATIENT
Start: 2018-01-12 | End: 2018-01-23 | Stop reason: HOSPADM

## 2018-01-12 RX ORDER — IBUPROFEN 200 MG
16 TABLET ORAL
Status: DISCONTINUED | OUTPATIENT
Start: 2018-01-12 | End: 2018-01-23 | Stop reason: HOSPADM

## 2018-01-12 RX ORDER — FLUTICASONE FUROATE AND VILANTEROL 100; 25 UG/1; UG/1
1 POWDER RESPIRATORY (INHALATION) DAILY
Status: DISCONTINUED | OUTPATIENT
Start: 2018-01-13 | End: 2018-01-23 | Stop reason: HOSPADM

## 2018-01-12 RX ORDER — LANOLIN ALCOHOL/MO/W.PET/CERES
800 CREAM (GRAM) TOPICAL
Status: DISCONTINUED | OUTPATIENT
Start: 2018-01-12 | End: 2018-01-23 | Stop reason: HOSPADM

## 2018-01-12 RX ORDER — IBUPROFEN 200 MG
24 TABLET ORAL
Status: DISCONTINUED | OUTPATIENT
Start: 2018-01-12 | End: 2018-01-23 | Stop reason: HOSPADM

## 2018-01-12 RX ORDER — SODIUM CHLORIDE 9 MG/ML
INJECTION, SOLUTION INTRAVENOUS CONTINUOUS
Status: ACTIVE | OUTPATIENT
Start: 2018-01-12 | End: 2018-01-13

## 2018-01-12 RX ORDER — FAMOTIDINE 20 MG/1
20 TABLET, FILM COATED ORAL 2 TIMES DAILY
Status: DISCONTINUED | OUTPATIENT
Start: 2018-01-12 | End: 2018-01-23 | Stop reason: HOSPADM

## 2018-01-12 RX ORDER — GALANTAMINE 4 MG/1
16 TABLET, FILM COATED ORAL 2 TIMES DAILY WITH MEALS
Status: DISCONTINUED | OUTPATIENT
Start: 2018-01-13 | End: 2018-01-23 | Stop reason: HOSPADM

## 2018-01-12 RX ORDER — CYCLOBENZAPRINE HCL 5 MG
5 TABLET ORAL 3 TIMES DAILY PRN
Status: DISCONTINUED | OUTPATIENT
Start: 2018-01-12 | End: 2018-01-23 | Stop reason: HOSPADM

## 2018-01-12 RX ORDER — IPRATROPIUM BROMIDE AND ALBUTEROL SULFATE 2.5; .5 MG/3ML; MG/3ML
3 SOLUTION RESPIRATORY (INHALATION) EVERY 4 HOURS PRN
Status: DISCONTINUED | OUTPATIENT
Start: 2018-01-12 | End: 2018-01-23 | Stop reason: HOSPADM

## 2018-01-12 RX ORDER — ASPIRIN 81 MG/1
81 TABLET ORAL EVERY MORNING
Status: DISCONTINUED | OUTPATIENT
Start: 2018-01-13 | End: 2018-01-16

## 2018-01-12 RX ORDER — VENLAFAXINE HYDROCHLORIDE 37.5 MG/1
150 CAPSULE, EXTENDED RELEASE ORAL DAILY
Status: DISCONTINUED | OUTPATIENT
Start: 2018-01-13 | End: 2018-01-23 | Stop reason: HOSPADM

## 2018-01-12 RX ORDER — MEMANTINE HYDROCHLORIDE 10 MG/1
10 TABLET ORAL 2 TIMES DAILY
Status: DISCONTINUED | OUTPATIENT
Start: 2018-01-12 | End: 2018-01-23 | Stop reason: HOSPADM

## 2018-01-12 RX ORDER — PROCHLORPERAZINE MALEATE 5 MG
10 TABLET ORAL EVERY 6 HOURS PRN
Status: DISCONTINUED | OUTPATIENT
Start: 2018-01-12 | End: 2018-01-23 | Stop reason: HOSPADM

## 2018-01-12 RX ORDER — OLANZAPINE 2.5 MG/1
7.5 TABLET ORAL NIGHTLY
Status: DISCONTINUED | OUTPATIENT
Start: 2018-01-12 | End: 2018-01-23 | Stop reason: HOSPADM

## 2018-01-12 RX ORDER — ACETAMINOPHEN 325 MG/1
650 TABLET ORAL EVERY 4 HOURS PRN
Status: DISCONTINUED | OUTPATIENT
Start: 2018-01-12 | End: 2018-01-23 | Stop reason: HOSPADM

## 2018-01-12 RX ORDER — FINASTERIDE 5 MG/1
5 TABLET, FILM COATED ORAL
Status: DISCONTINUED | OUTPATIENT
Start: 2018-01-13 | End: 2018-01-23 | Stop reason: HOSPADM

## 2018-01-12 RX ORDER — OXYCODONE HYDROCHLORIDE 5 MG/1
5 TABLET ORAL EVERY 6 HOURS PRN
Status: DISCONTINUED | OUTPATIENT
Start: 2018-01-12 | End: 2018-01-12

## 2018-01-12 RX ORDER — SODIUM CHLORIDE 0.9 % (FLUSH) 0.9 %
5 SYRINGE (ML) INJECTION
Status: DISCONTINUED | OUTPATIENT
Start: 2018-01-12 | End: 2018-01-23 | Stop reason: HOSPADM

## 2018-01-12 RX ORDER — GABAPENTIN 300 MG/1
300 CAPSULE ORAL 3 TIMES DAILY
Status: DISCONTINUED | OUTPATIENT
Start: 2018-01-12 | End: 2018-01-23 | Stop reason: HOSPADM

## 2018-01-12 RX ADMIN — SODIUM CHLORIDE: 0.9 INJECTION, SOLUTION INTRAVENOUS at 11:01

## 2018-01-12 RX ADMIN — ALTEPLASE 2 MG: 2.2 INJECTION, POWDER, LYOPHILIZED, FOR SOLUTION INTRAVENOUS at 06:01

## 2018-01-12 RX ADMIN — GABAPENTIN 300 MG: 300 CAPSULE ORAL at 11:01

## 2018-01-12 RX ADMIN — FAMOTIDINE 20 MG: 20 TABLET, FILM COATED ORAL at 11:01

## 2018-01-12 RX ADMIN — OXYCODONE HYDROCHLORIDE 5 MG: 5 TABLET ORAL at 09:01

## 2018-01-12 RX ADMIN — MEMANTINE 10 MG: 10 TABLET ORAL at 11:01

## 2018-01-12 RX ADMIN — OLANZAPINE 7.5 MG: 2.5 TABLET, FILM COATED ORAL at 10:01

## 2018-01-12 NOTE — PROGRESS NOTES
HEMATOLOGIC MALIGNANCIES PROGRESS NOTE    IDENTIFYING STATEMENT   Lane Nunez Jr. (Lane) is a 67 y.o. male with a  of 1950 from [place] with the diagnosis of multiple myeloma and CD30+ T-cell lymphoma.      ONCOLOGY HISTORY:    1. [place] light chain multiple myeloma s/p autologous stem cell transplant   A.  - Isolated plasmacytoma of the T-spine   B. 2010: Developed low back pain. MRI showed L5 compression fracture and marrow signal. BMBx at that time showed no significant plasma cell population.   C. 2011: Metastatic skeletal survey showed a lytic lesion in the L humerus, lucent changes in the calvarium, and possible lytic lesion at the R inferior pubic ramus.   D. 2013: MRI showed multiple lytic lesions throughout the spine, ribs, bones, with significant expansion of th C2 vertebral body wtih some soft tissue anterior and posterior with some cord effacement. Small nodes were seen in the L supraclavicular region, largest 2 x 1.8 cm.    E. 2013: MRI C,T-spine showed multifocal osseous lesions throughout with dsic bulging at C3-4 and C4-5.    F. 9/3/2013: Kappa light chains 2577 (units?), lambda 11.83, ratio 217. SPEP shows no M-protein.   G. 2013: BMBx - 4% monoclonal plasma cell population. FISH negative.    H. 2013: Initial consult with Dr. Nickolas FLORES   10/24/2013: Biopsy of lytic bone lesion (spine) - sheets of plasma cells consistent with plasma cell neoplasm.    J. 11/15/2013: Presented to [facility] with cervical compression fracture. Cervical fusion surgery performed.    K. 13: Began bortezomib + dexamethasone therapy.    L. 2014: Restaging at completion of therapy (pre-transplant); BMBx - 50-60% cellular marrow with no evidence of plasma cell dyscrasia. SPEP and ELENO normal. Kappa light chains 1.39 mg/dl, lambda 0.79, ratio 1.76.    M. 2014: autologous stem cell transplant with melphalan conditioning. Subsequently started on maintenance lenalidomide.   N. 2015:  "1-year restaging; BMBx - 5-6% plasma cells; SPEP measures no M-protein. ELENO shows faint irregularity involving IgG lambda (not kappa), kappa light chains 2.37 mg/dl, lambda 1.07, ratio 2.21. Findings are consistent with very good partial response by IMWG criteria.    O. 6/27/2016: Kappa light chains 3.7 mg/dl, lambda 1.51, ratio 2.45. PET/CT shows multiple lytic bone lesions involving axial and appendicular skeleton with no corresponding FDG hypometabolism, compatible with treated multiple myeloma.    P. 12/27/2016: Kappa light chains 5.1 mg/dl, lambda 1.73, ratio 2.95   Q. 7/6/2017: Kappa 3.46 mg/dl, lambda 2.29, ratio 1.51   R. 11/21/2017: Kappa 44.87, lambda 29.36, ratio 1.53    S. 12/28/17: Kappa 4.3 mg/dl, lambda 3.08 mg/dl, ratio 1.4; BMBx shows no evidence of plasma cell neoplasm; CT chest, abdomen, pelvis shows multiple lytic lesions. Apparent sCR.     2. 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 10/2017: Began feeling weak. Noticed developing skin lesions   B. 11/8/2017: Skin biopsy - CD30+ lymphoproliferative disorder. 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 12/28/17: CT chest, abdomen, pelvis shows multiple enlarged mediastinal and right hilar lymph nodes... Mildly prominent bilateral axillary lymph nodes. 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     3. History of cervical spine fracture and fusion surgery related to multiple myeloma above    INTERVAL HISTORY:      Mr. Nunez returns to clinic for follow-up after his recent hospitalization to learn of his biopsy results. Since his discharge, he initially felt well, but he has been declining " over the past week. He has been getting weaker around the house, and his wife is again having to do everything for him. He got very weak trying to get out of a wheelchair yesterday, and his wife brought him to the local emergency department.     He remains very weak. He nearly fell when standing up to be weighed upon arriving at his appointment today. He felt lightheaded and initially had some word-finding difficulty which improved. His vitals signs were okay, but he was tachycardic.     Past Medical History, Past Social History and Past Family History have been reviewed and are unchanged except as noted in the interval history.    MEDICATIONS:     Current Facility-Administered Medications on File Prior to Visit   Medication Dose Route Frequency Provider Last Rate Last Dose    [COMPLETED] 0.9%  NaCl infusion  500 mL Intravenous ED 1 Time Robb Prather MD   Stopped at 01/11/18 1745    [DISCONTINUED] piperacillin-tazobactam 3.375 g in dextrose 5 % 50 mL IVPB (ready to mix system)  3.375 g Intravenous ED 1 Time Robb Prather MD        [DISCONTINUED] sodium phosphates 19-7 gram/118 mL enema 1 enema  1 enema Rectal ED 1 Time Robb Prather MD        [COMPLETED] sodium phosphates 19-7 gram/118 mL enema 1 enema  1 enema Rectal Once Robb Prather MD   1 enema at 01/11/18 1819    [DISCONTINUED] vancomycin (VANCOCIN) 1000 mg in dextrose 5 % 200 mL IVPB  1,000 mg Intravenous ED 1 Time Robb Prather MD         Current Outpatient Prescriptions on File Prior to Visit   Medication Sig Dispense Refill    amitriptyline (ELAVIL) 50 MG tablet Take 50 mg by mouth every evening.      aspirin (ECOTRIN) 81 MG EC tablet Take 81 mg by mouth every morning.       budesonide-formoterol 160-4.5 mcg (SYMBICORT) 160-4.5 mcg/actuation HFAA Inhale 2 puffs into the lungs every 12 (twelve) hours. Controller      cholecalciferol, vitamin D3, (VITAMIN D3) 2,000 unit Cap Take 1 capsule by mouth after lunch.        cyclobenzaprine (FLEXERIL) 5 MG tablet Take 1 tablet (5 mg total) by mouth 3 (three) times daily as needed for Muscle spasms. 30 tablet 2    diazePAM (VALIUM) 5 MG tablet Take 1 tablet (5 mg total) by mouth every 6 (six) hours as needed for Anxiety. 60 tablet 0    finasteride (PROSCAR) 5 mg tablet Take 5 mg by mouth after lunch.       furosemide (LASIX) 20 MG tablet Take 1 tablet (20 mg total) by mouth once daily. 30 tablet 3    gabapentin (NEURONTIN) 300 MG capsule Take 300 mg by mouth 3 (three) times daily.      galantamine (RAZADYNE) 8 MG Tab Take 16 mg by mouth 2 (two) times daily with meals.       memantine (NAMENDA) 5 MG Tab Take 10 mg by mouth 2 (two) times daily.       morphine (MSIR) 15 MG tablet Take 1 tablet (15 mg total) by mouth every 6 (six) hours as needed for Pain. 120 tablet 0    MULTIVIT &MINERALS/FERROUS FUM (MULTI VITAMIN ORAL) Take by mouth.      naproxen (EC NAPROSYN) 500 MG EC tablet   2    olanzapine (ZYPREXA) 15 MG Tab Take 7.5 mg by mouth nightly.      potassium chloride (KLOR-CON) 10 MEQ TbSR Take 1 tablet (10 mEq total) by mouth once daily. 30 tablet 3    potassium chloride SA (K-DUR,KLOR-CON) 10 MEQ tablet Take 10 mEq by mouth once daily.  3    predniSONE (DELTASONE) 10 MG tablet Take 7.5 tablets (75 mg total) by mouth once daily. 75 tablet 0    ranitidine (ZANTAC) 150 MG capsule Take 150 mg by mouth 2 (two) times daily.      TRAVATAN Z 0.004 % Drop Place 1 drop into both eyes nightly.  4    venlafaxine 150 mg TR24 Take 150 mg by mouth once daily.      vitamin E 1000 UNIT capsule Take 400 Units by mouth after lunch.       carvedilol (COREG) 3.125 MG tablet Take 6.25 mg by mouth 2 (two) times daily.      lisinopril (PRINIVIL,ZESTRIL) 5 MG tablet Take 10 mg by mouth once daily.          ALLERGIES:   Review of patient's allergies indicates:   Allergen Reactions    Keflex [cephalexin] Rash        ROS:       Review of Systems   Constitutional: Positive for fatigue and  unexpected weight change. Negative for diaphoresis and fever.   HENT:   Negative for lump/mass and sore throat.    Eyes: Negative for icterus.   Respiratory: Negative for cough and shortness of breath.    Cardiovascular: Negative for chest pain and palpitations.   Gastrointestinal: Positive for abdominal distention. Negative for constipation, diarrhea, nausea and vomiting.        Loss of appetite   Genitourinary: Negative for dysuria and frequency.    Musculoskeletal: Positive for gait problem. Negative for arthralgias and myalgias.   Skin: Negative for rash.        Nodules have formed throughout his upper body   Neurological: Positive for extremity weakness and gait problem. Negative for dizziness and headaches.   Hematological: Negative for adenopathy. Does not bruise/bleed easily.   Psychiatric/Behavioral: The patient is not nervous/anxious.        PHYSICAL EXAM:  Vitals:    01/12/18 1409   BP: 135/73   Pulse: (!) 128   Resp: 20   Temp: 99.3 °F (37.4 °C)   TempSrc: Oral   SpO2: 97%       Physical Exam   Constitutional: He is oriented to person, place, and time.   Appears fatigued.    HENT:   Head: Normocephalic and atraumatic.   Mouth/Throat: Oropharynx is clear and moist and mucous membranes are normal. No oral lesions.   Temporal wasting is present   Eyes: Conjunctivae are normal.   Neck: No thyromegaly present.   Cardiovascular: Regular rhythm and normal heart sounds.    No murmur heard.  Tachycardic   Pulmonary/Chest: Breath sounds normal. He has no wheezes. He has no rales.   Abdominal: Soft. He exhibits no distension and no mass. There is no splenomegaly or hepatomegaly. There is no tenderness.   Lymphadenopathy:     He has no cervical adenopathy.        Right cervical: No deep cervical adenopathy present.       Left cervical: No deep cervical adenopathy present.     He has no axillary adenopathy.        Right: No inguinal adenopathy present.        Left: No inguinal adenopathy present.   Neurological: He  is alert and oriented to person, place, and time. He has normal reflexes. No cranial nerve deficit. Coordination normal.   Markedly weak in bilateral lower extremities. Can barely lift legs against gravity. On standing, he nearly falls over.    Skin: No rash noted.   There are nodules across the skin of the upper body. These are present on the bilateral upper extremities, on the head, and on the chest, abdomen, and back. None are present on the legs. This has worsened since my previous exam on 12/27/17.          LAB:   Results for orders placed or performed in visit on 01/12/18   Comprehensive metabolic panel   Result Value Ref Range    Sodium 138 136 - 145 mmol/L    Potassium 4.5 3.5 - 5.1 mmol/L    Chloride 102 95 - 110 mmol/L    CO2 25 23 - 29 mmol/L    Glucose 106 70 - 110 mg/dL    BUN, Bld 23 8 - 23 mg/dL    Creatinine 1.1 0.5 - 1.4 mg/dL    Calcium 9.8 8.7 - 10.5 mg/dL    Total Protein 7.2 6.0 - 8.4 g/dL    Albumin 2.2 (L) 3.5 - 5.2 g/dL    Total Bilirubin 0.6 0.1 - 1.0 mg/dL    Alkaline Phosphatase 231 (H) 55 - 135 U/L    AST 22 10 - 40 U/L    ALT 25 10 - 44 U/L    Anion Gap 11 8 - 16 mmol/L    eGFR if African American >60.0 >60 mL/min/1.73 m^2    eGFR if non African American >60.0 >60 mL/min/1.73 m^2       PROBLEMS ASSESSED THIS VISIT:    No diagnosis found.    PLAN:       1. 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     We discussed in detail the nature of ALCL and that it is treatable, though historically difficult to cure, with chemotherapy. Current guidelines recommend multiagent chemotherapy in the first line.    Recently, the results of a Phase II trial evaluating the use of brentuximab vedotin in relapsed/refractory ALCL were published (Pro et al. Blood. 2017;130:3772-8299). With a median follow-up  of 6 years from the start of treatment, the 5-year overall survival rate was 60%. Importantly, there was an 88% overall response rate, with CR rates of 52-66% based on ALK status. Many of the responses were durable.    Given the favorable data of brentuximab and Mr. Nunez' limited performance status at this time, we will plan to admit him for treatment with brentuximab while also providing supportive care to help him improve his performance status and return home.     2. Kappa light chains multiple myeloma, s/p autologous stem cell transplant - He appears to be in a stringent complete response (sCR) at this time based on IMWG criteria as there are no detectable monoclonal proteins detected, his kappa/lambda ratio is normal, and his marrow shows no evidence of plasma cell dyscrasia. We will continue to monitor.      3. Anemia  4. Thrombocytopenia    Unclear cause. I suspect this is secondary to the ALCL, but he has no splenomegaly or bone marrow involvement. We will need to monitor closely. The thrombocytopenia did not response to steroids while he was in the hospital, so this does not appear to be ITP, though that has not been completely ruled out.     Follow-up to be determined upon hospital discharge.     Idris Bernabe MD  Hematology and Stem Cell Transplant

## 2018-01-13 PROBLEM — R00.0 TACHYCARDIA: Status: ACTIVE | Noted: 2018-01-13

## 2018-01-13 LAB
ALBUMIN SERPL BCP-MCNC: 2 G/DL
ALP SERPL-CCNC: 194 U/L
ALT SERPL W/O P-5'-P-CCNC: 20 U/L
ANION GAP SERPL CALC-SCNC: 9 MMOL/L
ANISOCYTOSIS BLD QL SMEAR: SLIGHT
AST SERPL-CCNC: 21 U/L
BASOPHILS # BLD AUTO: 0 K/UL
BASOPHILS NFR BLD: 0 %
BILIRUB SERPL-MCNC: 0.4 MG/DL
BUN SERPL-MCNC: 21 MG/DL
CALCIUM SERPL-MCNC: 9.2 MG/DL
CHLORIDE SERPL-SCNC: 103 MMOL/L
CO2 SERPL-SCNC: 25 MMOL/L
CREAT SERPL-MCNC: 0.9 MG/DL
DIFFERENTIAL METHOD: ABNORMAL
EOSINOPHIL # BLD AUTO: 0 K/UL
EOSINOPHIL NFR BLD: 0 %
ERYTHROCYTE [DISTWIDTH] IN BLOOD BY AUTOMATED COUNT: 17.8 %
EST. GFR  (AFRICAN AMERICAN): >60 ML/MIN/1.73 M^2
EST. GFR  (NON AFRICAN AMERICAN): >60 ML/MIN/1.73 M^2
GLUCOSE SERPL-MCNC: 87 MG/DL
HCT VFR BLD AUTO: 23.6 %
HGB BLD-MCNC: 7.8 G/DL
IMM GRANULOCYTES # BLD AUTO: 0.06 K/UL
IMM GRANULOCYTES NFR BLD AUTO: 0.8 %
INR PPP: 0.9
LDH SERPL L TO P-CCNC: 1895 U/L
LYMPHOCYTES # BLD AUTO: 1 K/UL
LYMPHOCYTES NFR BLD: 13.8 %
MAGNESIUM SERPL-MCNC: 1.9 MG/DL
MCH RBC QN AUTO: 31.7 PG
MCHC RBC AUTO-ENTMCNC: 33.1 G/DL
MCV RBC AUTO: 96 FL
MONOCYTES # BLD AUTO: 0.9 K/UL
MONOCYTES NFR BLD: 12.4 %
NEUTROPHILS # BLD AUTO: 5.5 K/UL
NEUTROPHILS NFR BLD: 73 %
NRBC BLD-RTO: 1 /100 WBC
OVALOCYTES BLD QL SMEAR: ABNORMAL
PHOSPHATE SERPL-MCNC: 3.2 MG/DL
PLATELET # BLD AUTO: 29 K/UL
PLATELET BLD QL SMEAR: ABNORMAL
PMV BLD AUTO: 12.7 FL
POIKILOCYTOSIS BLD QL SMEAR: SLIGHT
POLYCHROMASIA BLD QL SMEAR: ABNORMAL
POTASSIUM SERPL-SCNC: 4.1 MMOL/L
PROT SERPL-MCNC: 6.2 G/DL
PROTHROMBIN TIME: 10.2 SEC
RBC # BLD AUTO: 2.46 M/UL
SODIUM SERPL-SCNC: 137 MMOL/L
TACROLIMUS BLD-MCNC: <1.5 NG/ML
URATE SERPL-MCNC: 5.9 MG/DL
WBC # BLD AUTO: 7.51 K/UL

## 2018-01-13 PROCEDURE — 85610 PROTHROMBIN TIME: CPT

## 2018-01-13 PROCEDURE — 85025 COMPLETE CBC W/AUTO DIFF WBC: CPT

## 2018-01-13 PROCEDURE — 25000003 PHARM REV CODE 250: Performed by: STUDENT IN AN ORGANIZED HEALTH CARE EDUCATION/TRAINING PROGRAM

## 2018-01-13 PROCEDURE — 81003 URINALYSIS AUTO W/O SCOPE: CPT

## 2018-01-13 PROCEDURE — 97162 PT EVAL MOD COMPLEX 30 MIN: CPT

## 2018-01-13 PROCEDURE — 93010 ELECTROCARDIOGRAM REPORT: CPT | Mod: ,,, | Performed by: INTERNAL MEDICINE

## 2018-01-13 PROCEDURE — 84550 ASSAY OF BLOOD/URIC ACID: CPT

## 2018-01-13 PROCEDURE — 80197 ASSAY OF TACROLIMUS: CPT

## 2018-01-13 PROCEDURE — 25000242 PHARM REV CODE 250 ALT 637 W/ HCPCS: Performed by: STUDENT IN AN ORGANIZED HEALTH CARE EDUCATION/TRAINING PROGRAM

## 2018-01-13 PROCEDURE — 20600001 HC STEP DOWN PRIVATE ROOM

## 2018-01-13 PROCEDURE — 83735 ASSAY OF MAGNESIUM: CPT

## 2018-01-13 PROCEDURE — 63600175 PHARM REV CODE 636 W HCPCS: Performed by: STUDENT IN AN ORGANIZED HEALTH CARE EDUCATION/TRAINING PROGRAM

## 2018-01-13 PROCEDURE — 87799 DETECT AGENT NOS DNA QUANT: CPT

## 2018-01-13 PROCEDURE — 80053 COMPREHEN METABOLIC PANEL: CPT

## 2018-01-13 PROCEDURE — 93005 ELECTROCARDIOGRAM TRACING: CPT

## 2018-01-13 PROCEDURE — 84100 ASSAY OF PHOSPHORUS: CPT

## 2018-01-13 PROCEDURE — 83615 LACTATE (LD) (LDH) ENZYME: CPT

## 2018-01-13 PROCEDURE — 99223 1ST HOSP IP/OBS HIGH 75: CPT | Mod: GC,,, | Performed by: INTERNAL MEDICINE

## 2018-01-13 PROCEDURE — 86703 HIV-1/HIV-2 1 RESULT ANTBDY: CPT

## 2018-01-13 RX ORDER — SODIUM CHLORIDE 9 MG/ML
INJECTION, SOLUTION INTRAVENOUS CONTINUOUS
Status: ACTIVE | OUTPATIENT
Start: 2018-01-13 | End: 2018-01-14

## 2018-01-13 RX ADMIN — PREDNISONE 75 MG: 20 TABLET ORAL at 08:01

## 2018-01-13 RX ADMIN — FAMOTIDINE 20 MG: 20 TABLET, FILM COATED ORAL at 08:01

## 2018-01-13 RX ADMIN — SODIUM CHLORIDE: 0.9 INJECTION, SOLUTION INTRAVENOUS at 02:01

## 2018-01-13 RX ADMIN — GABAPENTIN 300 MG: 300 CAPSULE ORAL at 06:01

## 2018-01-13 RX ADMIN — MEMANTINE 10 MG: 10 TABLET ORAL at 08:01

## 2018-01-13 RX ADMIN — GABAPENTIN 300 MG: 300 CAPSULE ORAL at 10:01

## 2018-01-13 RX ADMIN — GALANTAMINE HYDROBROMIDE 16 MG: 4 TABLET, FILM COATED ORAL at 08:01

## 2018-01-13 RX ADMIN — OLANZAPINE 7.5 MG: 2.5 TABLET, FILM COATED ORAL at 08:01

## 2018-01-13 RX ADMIN — GALANTAMINE HYDROBROMIDE 16 MG: 4 TABLET, FILM COATED ORAL at 05:01

## 2018-01-13 RX ADMIN — FINASTERIDE 5 MG: 5 TABLET, FILM COATED ORAL at 01:01

## 2018-01-13 RX ADMIN — VENLAFAXINE HYDROCHLORIDE 150 MG: 37.5 CAPSULE, EXTENDED RELEASE ORAL at 08:01

## 2018-01-13 RX ADMIN — GABAPENTIN 300 MG: 300 CAPSULE ORAL at 02:01

## 2018-01-13 RX ADMIN — MORPHINE SULFATE 15 MG: 15 TABLET ORAL at 11:01

## 2018-01-13 RX ADMIN — ASPIRIN 81 MG: 81 TABLET, COATED ORAL at 08:01

## 2018-01-13 RX ADMIN — MORPHINE SULFATE 15 MG: 15 TABLET ORAL at 08:01

## 2018-01-13 RX ADMIN — FLUTICASONE FUROATE AND VILANTEROL TRIFENATATE 1 PUFF: 100; 25 POWDER RESPIRATORY (INHALATION) at 08:01

## 2018-01-13 NOTE — SUBJECTIVE & OBJECTIVE
Subjective:     Interval History: see hpi    Objective:     Vital Signs (Most Recent):  Temp: 97.9 °F (36.6 °C) (01/12/18 2000)  Pulse: (!) 121 (01/12/18 2000)  Resp: 16 (01/12/18 2000)  BP: 126/71 (01/12/18 2000)  SpO2: 97 % (01/12/18 2000) Vital Signs (24h Range):  Temp:  [97.9 °F (36.6 °C)-99.3 °F (37.4 °C)] 97.9 °F (36.6 °C)  Pulse:  [121-128] 121  Resp:  [16-20] 16  SpO2:  [97 %] 97 %  BP: (126-140)/(71-77) 126/71     Weight: 99.5 kg (219 lb 5.7 oz)  Body mass index is 28.94 kg/m².  Body surface area is 2.26 meters squared.    ECOG SCORE         [unfilled]    Intake/Output - Last 3 Shifts       01/10 0700 - 01/11 0659 01/11 0700 - 01/12 0659 01/12 0700 - 01/13 0659    Urine (mL/kg/hr)   400    Total Output     400    Net     -400                 Physical Exam   Constitutional: He appears well-developed.   HENT:   Head: Normocephalic.   Eyes: EOM are normal. Pupils are equal, round, and reactive to light.   Neck: Normal range of motion. No tracheal deviation present.   Cardiovascular: Regular rhythm and normal heart sounds.  Exam reveals no gallop and no friction rub.    No murmur heard.  tachycardic   Pulmonary/Chest: Effort normal and breath sounds normal. No respiratory distress. He has no wheezes. He has no rales.   Abdominal: Soft. Bowel sounds are normal. He exhibits no distension and no mass. There is no tenderness. There is no guarding.   Musculoskeletal: He exhibits no edema.   Neurological: He is alert.   Skin: Skin is warm and dry.   Multiple areas of erythematous papular and vesicular lesions throughout body       Significant Labs:   CBC:   Recent Labs  Lab 01/11/18  1628 01/12/18  1857   WBC 7.70 8.52   HGB 8.6* 8.4*   HCT 26.1* 25.5*   PLT 30* 30*   , CMP:   Recent Labs  Lab 01/11/18  1628 01/12/18  1304 01/12/18  1857    138 137   K 4.4 4.5 4.7    102 102   CO2 30* 25 26   * 106 133*   BUN 25* 23 19   CREATININE 1.10 1.1 1.0   CALCIUM 9.2 9.8 9.5   PROT 7.1 7.2 6.9   ALBUMIN 3.5  2.2* 2.2*   BILITOT 0.5 0.6 0.5   ALKPHOS 226* 231* 220*   AST 35 22 22   ALT 32 25 25   ANIONGAP  --  11 9   EGFRNONAA >60 >60.0 >60.0    and Coagulation:   Recent Labs  Lab 01/12/18  1857   INR 1.0   APTT 22.7       Diagnostic Results:  None

## 2018-01-13 NOTE — ASSESSMENT & PLAN NOTE
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 BM Bx negative. Plan for inpatient treatment with brentuximab.  -skin bx on 12/28/17- SKIN, RIGHT UPPER BACK, EXCISIONAL BIOPSY: CD30-positive T-cell lymphoproliferative disorder  -PICC line placement (has port from years ago, unsure if accessible)  -continue prednisone  -Viral workup (HIV, hepatitis, EBV, CMV) ordered  -daily TLS labs

## 2018-01-13 NOTE — HPI
"68 yo man with MM s/p auto SCT 3 years ago and newly diagnosed ALCL. He presented as a referral with subsequent admission 12/28/2017 for expedited workup of new constitutional sx (weightloss, fatigue, fevers, chills) with associated vesicular lesions spread diffusely throughout his body.  He was started on steroids after a thorough workup including bone marrow biopsy and skin biopsy were done. 12/28/17 skin biopsy resulted : "The differential diagnosis includes lymphomatoid papulosis type C (LyP) vs. primary cutaneous anaplastic large cell lymphoma (C-ALCL) vs. cutaneous involvement by a systemic ALK-1 negative ALCL. Clinical correlation is required for this distinction."    He presented to clinic 1/12/18 for follow up with Dr. Bernabe  Since his discharge, he initially felt well, but he has been declining over the past week. He has been getting weaker around the house, and his wife is again having to do everything for him. He got very weak trying to get out of a wheelchair yesterday, and his wife brought him to the local emergency department.  There he received IV hydration, felt better, and was subsequently discharged.  However again today he describes having orthostatic symptoms (dizziness when standing).  For his leg weakness, it is not limited by pain nor does he have numbness or tingling.  Wife accompanied him today and reports that he spends a lot of time in bed and eats about 2 meals a day at most with little fluid intake.  Denies nausea, vomiting, fevers, chills, myalgias, cough, shortness of breath, abdominal pain, or diarrhea.    Previous admission "Oncologic history: 12/28/2017: Pt admitted from bmt clinic after seeing Dr. Idris Bernabe for expedited work up. Follows with Dr. Patricia in [place], [place]. Hx of multiple myeloma with auto SCT 3 years ago. Now with possible concerns for lymphoma (cutaneous?). Denies SOB, chest pain, swelling. With weight loss and fatigue, as well as fevers/chills. With numerous " "vesicular lesions on trunk, arms, head, above groin (started about 1.5 months ago). Previously had skin bx 11/2017. Reported to show CD30+ lymphoproliferative disorder. Will need bm bx, repeat skin bx, skeletal survey, and to start prednisone 1 mg/kg/day after biopsies are performed. States he has been off revlimid for 3-4 weeks due to thrombocytopenia.  12/29/2017: Skeletal survey without evidence of metastatic disease, skin bx and BM bx both pending. Started on steroids pred 100 mg/day. No issues overnight."  "

## 2018-01-13 NOTE — PLAN OF CARE
Problem: Patient Care Overview  Goal: Plan of Care Review  Outcome: Ongoing (interventions implemented as appropriate)  Plan of care reviewed with patient and son.  Fall precautions maintained, side rails up x2, call light in reach, bad in low position and locked.  Requesting pain medication today and getting relief in hands and feet.  Son at the bedside.

## 2018-01-13 NOTE — NURSING
Bmt team notified of patient's heart rate is 138-140 at this time.  They said before they give the rituxan  they need to figure out the heart rate.

## 2018-01-13 NOTE — ASSESSMENT & PLAN NOTE
- possibly secondary to dementia  - He has refused a G-tube on multiple occasions in the past per chart  - During last admission, patient had regular diet with nectar thick liquids and aspiration precautions

## 2018-01-13 NOTE — PT/OT/SLP EVAL
Physical Therapy Evaluation    Patient Name:  Lane Nunez Jr.   MRN:  [id number]    Recommendations:     Discharge Recommendations:  nursing facility, skilled (may progress to )   Discharge Equipment Recommendations: none   Barriers to discharge: Decreased caregiver support (at current functional level)    Assessment:     Lane Nunez Jr. is a 67 y.o. 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  He presents with the following impairments/functional limitations:  weakness, impaired functional mobilty, gait instability, impaired endurance, impaired balance, impaired self care skills, edema, pain, decreased safety awareness, impaired cardiopulmonary response to activity, impaired sensation. Pt able to perform functional mobility with assist. Pt able to take lateral steps at EOB but with increased difficulty noted. Unable to progress ambulation further 2* weakness, impaired endurance, fatigue, poor postural control, and decreased balance. Pt would benefit from skilled acute PT in order to address current deficits and progress functional mobility. Pt is a good candidate for SNF upon d/c in order to maximize safety and (I) with mobility and reduce caregiver burden.     Rehab Prognosis:  good; patient would benefit from acute skilled PT services to address these deficits and reach maximum level of function.      Recent Surgery: * No surgery found *      Plan:     During this hospitalization, patient to be seen 3 x/week to address the above listed problems via gait training, therapeutic activities, therapeutic exercises, wheelchair management/training  · Plan of Care Expires:  02/11/18   Plan of Care Reviewed with: patient, spouse    Subjective     Communicated with RN prior to session.  Patient found supine upon PT entry to room, agreeable to evaluation.      Chief Complaint: neck pain   Patient comments/goals: return home    Pain/Comfort:  · Pain Rating 1: 7/10  · Location - Side 1: Bilateral  · Location - Orientation 1: generalized  · Location 1: neck  · Pain Addressed 1: Reposition, Distraction, Nurse notified    Patients cultural, spiritual, Yazidi conflicts given the current situation: none noted    Living Environment:  Pt lives with his wife in a mobile home with ramp to enter. PTA pt required assist with stand pivot transfers and ADLs. Pt was primarily pushed on rollator or w/c for household mobility and pushed in w/c for community mobility. Pt was receiving HH PT and OT  PTA and was ambulating limited distances with PT. Pt's wife reports that pt has fallen at least twice in the past 6 months.   Prior to admission, patients level of function was required assist and DME.  Patient has the following equipment: walker, rolling, rollator, wheelchair, bedside commode, shower chair, cane, quad.  Upon discharge, patient will have assistance from wife and children.    Objective:     Patient found with: peripheral IV     General Precautions: Standard, fall, aspiration   Orthopedic Precautions:N/A   Braces: N/A     Exams:  · Cognitive Exam:  Patient is oriented to Person and Place and follows two-step commands   · Postural Exam:  Patient presented with the following abnormalities:    · -       Rounded shoulders  · -       Forward head  · Sensation:  Decreased in B hands and feet 2* neuropathy   · Skin Integrity/Edema:      · -       Skin integrity: Dry B feet   · -       Edema: Mild BLE  · RLE ROM: WFL  · RLE Strength: WFL  · LLE ROM: WFL  · LLE Strength: WFL    Functional Mobility:  · Bed Mobility:     · Supine to Sit: minimum assistance (with HOB elevated)  · Sit to Supine: minimum assistance  · Transfers:     · Sit to Stand:  moderate assistance with no AD  · Gait: 4 lateral steps at EOB with modA   · Instability throughout with decreased postural control, decreased hip ext., impaired weight-shifting ability, and decreased  toe-floor clearance  · Cues throughout for sequencing and appropriate weight-shifting  · Balance:   · static standing: min-modA    · dynamic standing: modA    AM-PAC 6 CLICK MOBILITY  Total Score:12       Therapeutic Activities and Exercises:   Pt and wife educated on role of PT and PT POC. Pt and wife verbalized understanding.     Patient left supine with all lines intact, call button in reach and pt's wife present.    GOALS:    Physical Therapy Goals        Problem: Physical Therapy Goal    Goal Priority Disciplines Outcome Goal Variances Interventions   Physical Therapy Goal     PT/OT, PT Ongoing (interventions implemented as appropriate)     Description:  Goals to be met by: 18     Patient will increase functional independence with mobility by performin. Supine to sit with Stand-by Assistance  2. Sit to stand transfer with Minimal Assistance  3. Bed to chair transfer with Minimal Assistance using appropriate AD or without AD.   4. Gait  x 3 feet with Minimal Assistance using appropriate AD or without AD.   5. Wheelchair propulsion x20 feet with Stand-by Assistance using BUE and/or BLE.  6. Lower extremity exercise program x15 reps, with assistance as needed, in order to increase LE strength and (I) with functional mobility.                       History:     Past Medical History:   Diagnosis Date    Cancer     CHF (congestive heart failure)     Depression     GERD (gastroesophageal reflux disease)     High cholesterol     Hypertension     Left ventricular ejection fraction less than 40% 2017    Multiple myeloma     Renal disorder     Stroke     no sequelae       Past Surgical History:   Procedure Laterality Date    BACK SURGERY  2000    metal julieth at T-4    CHOLECYSTECTOMY      HEMORRHOID SURGERY      KYPHOSIS SURGERY  2010    PROSTATE SURGERY      SPINE SURGERY      TONSILLECTOMY         Clinical Decision Making:     History  Co-morbidities and personal factors that may impact  the plan of care Examination  Body Structures and Functions, activity limitations and participation restrictions that may impact the plan of care Clinical Presentation   Decision Making/ Complexity Score   Co-morbidities:   [] Time since onset of injury / illness / exacerbation  [x] Status of current condition  [x]Patient's cognitive status and safety concerns    [x] Multiple Medical Problems (see med hx)  Personal Factors:   [x] Patient's age  [x] Prior Level of function   [] Patient's home situation (environment and family support)  [] Patient's level of motivation  [] Expected progression of patient      HISTORY:(criteria)    [] 28785 - no personal factors/history    [] 45279 - has 1-2 personal factor/comorbidity     [x] 84520 - has >3 personal factor/comorbidity     Body Regions:  [] Objective examination findings  [] Head     []  Neck  [] Trunk   [] Upper Extremity  [] Lower Extremity    Body Systems:  [] For communication ability, affect, cognition, language, and learning style: the assessment of the ability to make needs known, consciousness, orientation (person, place, and time), expected emotional /behavioral responses, and learning preferences (eg, learning barriers, education  needs)  [x] For the neuromuscular system: a general assessment of gross coordinated movement (eg, balance, gait, locomotion, transfers, and transitions) and motor function  (motor control and motor learning)  [x] For the musculoskeletal system: the assessment of gross symmetry, gross range of motion, gross strength, height, and weight  [] For the integumentary system: the assessment of pliability(texture), presence of scar formation, skin color, and skin integrity  [x] For cardiovascular/pulmonary system: the assessment of heart rate, respiratory rate, blood pressure, and edema     Activity limitations:    [x] Patient's cognitive status and saf ety concerns          [] Status of current condition      [] Weight bearing  restriction  [] Cardiopulmunary Restriction    Participation Restrictions:   [] Goals and goal agreement with the patient     [] Rehab potential (prognosis) and probable outcome      Examination of Body System: (criteria)    [] 02679 - addressing 1-2 elements    [] 24181 - addressing a total of 3 or more elements     [x] 20501 -  Addressing a total of 4 or more elements         Clinical Presentation: (criteria)  Evolving - 89042     On examination of body system using standardized tests and measures patient presents with 4 or more elements from any of the following: body structures and functions, activity limitations, and/or participation restrictions.  Leading to a clinical presentation that is considered evolving with changing characteristics                              Clinical Decision Making  (Eval Complexity):  Moderate - 72335     Time Tracking:     PT Received On: 01/13/18  PT Start Time: 0835     PT Stop Time: 0856  PT Total Time (min): 21 min     Billable Minutes: Evaluation 21      Lesly Tinoco, PT, DPT   1/13/2018  [phone]

## 2018-01-13 NOTE — ASSESSMENT & PLAN NOTE
-had silva auto SCT 6/25/14 for myeloma, 3.5 years post transplant  - 1 year restaging marrow on 7/24/15 showed 40% cellularity with trilineage hematopoiesis and no morphologic evidence of plasma cells.  Cytogenetics could not be done as the metaphase cells did not grow.   - PET CT did not reveal any evidence of myeloma at 1 year   - skeletal survey 12/28/17 was negative  - maintenance Revlimid on hold

## 2018-01-13 NOTE — ASSESSMENT & PLAN NOTE
Anemia and thrombocytopenia likely 2/2 disease  - Revlimid has been on hold due to thrombocytopenia  - Transfuse for hgb <7 g/dL, plt <10 k/uL

## 2018-01-13 NOTE — H&P
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 He presented as a referral with subsequent admission 12/28/2017 for expedited workup of new constitutional sx (weightloss, fatigue, fevers, chills) with associated vesicular lesions spread diffusely throughout his body.  He was started on steroids after a thorough workup including bone marrow biopsy and skin biopsy were done. 12/28/17 skin biopsy resulted : "The differential diagnosis includes lymphomatoid papulosis type C (LyP) vs. primary cutaneous anaplastic large cell lymphoma (C-ALCL) vs. cutaneous involvement by a systemic ALK-1 negative ALCL. Clinical correlation is required for this distinction."    He presented to clinic 1/12/18 for follow up with Dr. Bernabe  Since his discharge, he initially felt well, but he has been declining over the past week. He has been getting weaker around the house, and his wife is again having to do everything for him. He got very weak trying to get out of a wheelchair yesterday, and his wife brought him to the local emergency department.  There he received IV hydration, felt better, and was subsequently discharged.  However again today he describes having orthostatic symptoms (dizziness when standing).  For his leg weakness, it is not limited by pain nor does he have numbness or tingling.  Wife accompanied him today and reports that he spends a lot of time in bed and eats about 2 meals a day at most with little fluid intake.  Denies nausea, vomiting, fevers, chills, myalgias, cough, shortness of breath, abdominal pain, or diarrhea.    Previous admission "Oncologic history: 12/28/2017: Pt admitted from bmt clinic after seeing Dr. Idris Bernabe for expedited work up. Follows with Dr. Patricia in [place]. Hx of multiple " "myeloma with auto SCT 3 years ago. Now with possible concerns for lymphoma (cutaneous?). Denies SOB, chest pain, swelling. With weight loss and fatigue, as well as fevers/chills. With numerous vesicular lesions on trunk, arms, head, above groin (started about 1.5 months ago). Previously had skin bx 11/2017. Reported to show CD30+ lymphoproliferative disorder. Will need bm bx, repeat skin bx, skeletal survey, and to start prednisone 1 mg/kg/day after biopsies are performed. States he has been off revlimid for 3-4 weeks due to thrombocytopenia.  12/29/2017: Skeletal survey without evidence of metastatic disease, skin bx and BM bx both pending. Started on steroids pred 100 mg/day. No issues overnight."      Subjective:     Interval History: see hpi    Objective:     Vital Signs (Most Recent):  Temp: 97.9 °F (36.6 °C) (01/12/18 2000)  Pulse: (!) 121 (01/12/18 2000)  Resp: 16 (01/12/18 2000)  BP: 126/71 (01/12/18 2000)  SpO2: 97 % (01/12/18 2000) Vital Signs (24h Range):  Temp:  [97.9 °F (36.6 °C)-99.3 °F (37.4 °C)] 97.9 °F (36.6 °C)  Pulse:  [121-128] 121  Resp:  [16-20] 16  SpO2:  [97 %] 97 %  BP: (126-140)/(71-77) 126/71     Weight: 99.5 kg (219 lb 5.7 oz)  Body mass index is 28.94 kg/m².  Body surface area is 2.26 meters squared.    ECOG SCORE         [unfilled]    Intake/Output - Last 3 Shifts       01/10 0700 - 01/11 0659 01/11 0700 - 01/12 0659 01/12 0700 - 01/13 0659    Urine (mL/kg/hr)   400    Total Output     400    Net     -400                 Physical Exam   Constitutional: He appears well-developed.   HENT:   Head: Normocephalic.   Eyes: EOM are normal. Pupils are equal, round, and reactive to light.   Neck: Normal range of motion. No tracheal deviation present.   Cardiovascular: Regular rhythm and normal heart sounds.  Exam reveals no gallop and no friction rub.    No murmur heard.  tachycardic   Pulmonary/Chest: Effort normal and breath sounds normal. No respiratory distress. He has no wheezes. He has no " rales.   Abdominal: Soft. Bowel sounds are normal. He exhibits no distension and no mass. There is no tenderness. There is no guarding.   Musculoskeletal: He exhibits no edema.   Neurological: He is alert.   Skin: Skin is warm and dry.   Multiple areas of erythematous papular and vesicular lesions throughout body       Significant Labs:   CBC:   Recent Labs  Lab 01/11/18  1628 01/12/18  1857   WBC 7.70 8.52   HGB 8.6* 8.4*   HCT 26.1* 25.5*   PLT 30* 30*   , CMP:   Recent Labs  Lab 01/11/18  1628 01/12/18  1304 01/12/18  1857    138 137   K 4.4 4.5 4.7    102 102   CO2 30* 25 26   * 106 133*   BUN 25* 23 19   CREATININE 1.10 1.1 1.0   CALCIUM 9.2 9.8 9.5   PROT 7.1 7.2 6.9   ALBUMIN 3.5 2.2* 2.2*   BILITOT 0.5 0.6 0.5   ALKPHOS 226* 231* 220*   AST 35 22 22   ALT 32 25 25   ANIONGAP  --  11 9   EGFRNONAA >60 >60.0 >60.0    and Coagulation:   Recent Labs  Lab 01/12/18  1857   INR 1.0   APTT 22.7       Diagnostic Results:  None    Assessment/Plan:     * 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 BM Bx negative. Plan for inpatient treatment with brentuximab.  -skin bx on 12/28/17- SKIN, RIGHT UPPER BACK, EXCISIONAL BIOPSY: CD30-positive T-cell lymphoproliferative disorder  -PICC line placement (has port from years ago, unsure if accessible)  -continue prednisone  -Viral workup (HIV, hepatitis, EBV, CMV) ordered  -daily TLS labs          Multiple myeloma in remission    See oncologic hx on H&P for more information  - had silva auto 6/25/14   - was in CR at 1 year  - maintenance Revlimid on hold for 4 weeks due to thrombocytopenia. Will continue to hold at this time  - now with BLE weakness, inability to walk--Will likely need PT/OT after therapy  - recent SPEP with normal protein, FLC from 12/28 with kappa light chain of 4.3, ratio of 1.40   - bone marrow biopsy 12/28/17 : NO MORPHOLOGIC OR IMMUNOPHENOTYPIC EVIDENCE OF RESIDUAL PLASMA CELL  NEOPLASM.  -- NO MORPHOLOGIC OR IMMUNOPHENOTYPIC EVIDENCE OF MARROW INVOLVEMENT BY  CD30-POSITIVE T-CELL LYMPHOPROLIFERATIVE DISORDER        Debility    -pt complaining of worsening orthostatic symptoms, decreased PO intake, and weakness over the past week  -maintenance fluids 75 cc/hr  -boost  -PT/OT consulted        Glaucoma    -continue home eye drops         Neuropathy    -continue home gabapentin  - PT/OT, will likely need home health        Dysphagia    - possibly secondary to dementia  - He has refused a G-tube on multiple occasions in the past per chart  - During last admission, patient had regular diet with nectar thick liquids and aspiration precautions          Cytopenia    Anemia and thrombocytopenia likely 2/2 disease  - Revlimid has been on hold due to thrombocytopenia  - Transfuse for hgb <7 g/dL, plt <10 k/uL         Cardiomyopathy    -hypotensive on admission, holding lasix 20 mg daily  - hx of decreased EF 30-40%          Hypertension    -hypotensive on admission, holding home coreg and lisinopril           History of peripheral stem cell transplant    -had silva auto SCT 6/25/14 for myeloma, 3.5 years post transplant  - 1 year restaging marrow on 7/24/15 showed 40% cellularity with trilineage hematopoiesis and no morphologic evidence of plasma cells.  Cytogenetics could not be done as the metaphase cells did not grow.   - PET CT did not reveal any evidence of myeloma at 1 year   - skeletal survey 12/28/17 was negative  - maintenance Revlimid on hold         BPH (benign prostatic hyperplasia)    -continue home finasteride           Memory impairment    - pt with demenia  - continue home memantine and galantamine          Depression    -Continue home amitriptyline, olanzapine             VTE Risk Mitigation         Ordered     Medium Risk of VTE  Once      01/12/18 1740     Place sequential compression device  Until discontinued      01/12/18 1740            Dana Fu MD  Bone Marrow  Transplant  Hematology  [facility]

## 2018-01-13 NOTE — ASSESSMENT & PLAN NOTE
See oncologic hx on H&P for more information  - had silva auto 6/25/14   - was in CR at 1 year  - maintenance Revlimid on hold for 4 weeks due to thrombocytopenia. Will continue to hold at this time  - now with BLE weakness, inability to walk--Will likely need PT/OT after therapy  - recent SPEP with normal protein, FLC from 12/28 with kappa light chain of 4.3, ratio of 1.40   - bone marrow biopsy 12/28/17 : NO MORPHOLOGIC OR IMMUNOPHENOTYPIC EVIDENCE OF RESIDUAL PLASMA CELL NEOPLASM.  -- NO MORPHOLOGIC OR IMMUNOPHENOTYPIC EVIDENCE OF MARROW INVOLVEMENT BY  CD30-POSITIVE T-CELL LYMPHOPROLIFERATIVE DISORDER

## 2018-01-13 NOTE — PLAN OF CARE
Problem: Physical Therapy Goal  Goal: Physical Therapy Goal  Goals to be met by: 18     Patient will increase functional independence with mobility by performin. Supine to sit with Stand-by Assistance  2. Sit to stand transfer with Minimal Assistance  3. Bed to chair transfer with Minimal Assistance using appropriate AD or without AD.   4. Gait  x 3 feet with Minimal Assistance using appropriate AD or without AD.   5. Wheelchair propulsion x20 feet with Stand-by Assistance using BUE and/or BLE.  6. Lower extremity exercise program x15 reps, with assistance as needed, in order to increase LE strength and (I) with functional mobility.     Outcome: Ongoing (interventions implemented as appropriate)  PT evaluation complete and appropriate goals established.    Lesly Tinoco, PT, DPT   2018  [phone]

## 2018-01-13 NOTE — ASSESSMENT & PLAN NOTE
-pt complaining of worsening orthostatic symptoms, decreased PO intake, and weakness over the past week  -maintenance fluids 75 cc/hr  -boost  -PT/OT consulted

## 2018-01-14 LAB
ALBUMIN SERPL BCP-MCNC: 1.9 G/DL
ALP SERPL-CCNC: 178 U/L
ALT SERPL W/O P-5'-P-CCNC: 17 U/L
ANION GAP SERPL CALC-SCNC: 8 MMOL/L
ANISOCYTOSIS BLD QL SMEAR: SLIGHT
AST SERPL-CCNC: 15 U/L
BASOPHILS # BLD AUTO: 0 K/UL
BASOPHILS NFR BLD: 0 %
BILIRUB SERPL-MCNC: 0.6 MG/DL
BILIRUB UR QL STRIP: NEGATIVE
BUN SERPL-MCNC: 15 MG/DL
CALCIUM SERPL-MCNC: 9 MG/DL
CHLORIDE SERPL-SCNC: 103 MMOL/L
CLARITY UR REFRACT.AUTO: CLEAR
CO2 SERPL-SCNC: 27 MMOL/L
COLOR UR AUTO: YELLOW
CREAT SERPL-MCNC: 0.8 MG/DL
DIFFERENTIAL METHOD: ABNORMAL
EOSINOPHIL # BLD AUTO: 0 K/UL
EOSINOPHIL NFR BLD: 0.3 %
ERYTHROCYTE [DISTWIDTH] IN BLOOD BY AUTOMATED COUNT: 17.9 %
EST. GFR  (AFRICAN AMERICAN): >60 ML/MIN/1.73 M^2
EST. GFR  (NON AFRICAN AMERICAN): >60 ML/MIN/1.73 M^2
GLUCOSE SERPL-MCNC: 79 MG/DL
GLUCOSE UR QL STRIP: NEGATIVE
HCT VFR BLD AUTO: 25.7 %
HGB BLD-MCNC: 8.1 G/DL
HGB UR QL STRIP: NEGATIVE
IMM GRANULOCYTES # BLD AUTO: 0.05 K/UL
IMM GRANULOCYTES NFR BLD AUTO: 0.7 %
INR PPP: 1
KETONES UR QL STRIP: NEGATIVE
LACTATE SERPL-SCNC: 3.1 MMOL/L
LEUKOCYTE ESTERASE UR QL STRIP: NEGATIVE
LYMPHOCYTES # BLD AUTO: 1.1 K/UL
LYMPHOCYTES NFR BLD: 15.4 %
MAGNESIUM SERPL-MCNC: 1.7 MG/DL
MCH RBC QN AUTO: 31 PG
MCHC RBC AUTO-ENTMCNC: 31.5 G/DL
MCV RBC AUTO: 99 FL
MONOCYTES # BLD AUTO: 0.9 K/UL
MONOCYTES NFR BLD: 12.8 %
NEUTROPHILS # BLD AUTO: 5.2 K/UL
NEUTROPHILS NFR BLD: 70.8 %
NITRITE UR QL STRIP: NEGATIVE
NRBC BLD-RTO: 1 /100 WBC
OVALOCYTES BLD QL SMEAR: ABNORMAL
PH UR STRIP: 6 [PH] (ref 5–8)
PHOSPHATE SERPL-MCNC: 2.8 MG/DL
PLATELET # BLD AUTO: 26 K/UL
PLATELET BLD QL SMEAR: ABNORMAL
PMV BLD AUTO: ABNORMAL FL
POIKILOCYTOSIS BLD QL SMEAR: SLIGHT
POTASSIUM SERPL-SCNC: 4 MMOL/L
PROT SERPL-MCNC: 6.1 G/DL
PROT UR QL STRIP: NEGATIVE
PROTHROMBIN TIME: 10.7 SEC
RBC # BLD AUTO: 2.61 M/UL
SODIUM SERPL-SCNC: 138 MMOL/L
SP GR UR STRIP: 1.01 (ref 1–1.03)
TROPONIN I SERPL DL<=0.01 NG/ML-MCNC: 0.01 NG/ML
URN SPEC COLLECT METH UR: NORMAL
UROBILINOGEN UR STRIP-ACNC: 4 EU/DL
WBC # BLD AUTO: 7.33 K/UL

## 2018-01-14 PROCEDURE — 93010 ELECTROCARDIOGRAM REPORT: CPT | Mod: ,,, | Performed by: INTERNAL MEDICINE

## 2018-01-14 PROCEDURE — 83735 ASSAY OF MAGNESIUM: CPT

## 2018-01-14 PROCEDURE — 99233 SBSQ HOSP IP/OBS HIGH 50: CPT | Mod: GC,,, | Performed by: INTERNAL MEDICINE

## 2018-01-14 PROCEDURE — 84100 ASSAY OF PHOSPHORUS: CPT

## 2018-01-14 PROCEDURE — 85610 PROTHROMBIN TIME: CPT

## 2018-01-14 PROCEDURE — 83605 ASSAY OF LACTIC ACID: CPT

## 2018-01-14 PROCEDURE — G8988 SELF CARE GOAL STATUS: HCPCS | Mod: CI

## 2018-01-14 PROCEDURE — 20600001 HC STEP DOWN PRIVATE ROOM

## 2018-01-14 PROCEDURE — 87040 BLOOD CULTURE FOR BACTERIA: CPT

## 2018-01-14 PROCEDURE — 93005 ELECTROCARDIOGRAM TRACING: CPT

## 2018-01-14 PROCEDURE — 80053 COMPREHEN METABOLIC PANEL: CPT

## 2018-01-14 PROCEDURE — 85025 COMPLETE CBC W/AUTO DIFF WBC: CPT

## 2018-01-14 PROCEDURE — 84484 ASSAY OF TROPONIN QUANT: CPT

## 2018-01-14 PROCEDURE — 97535 SELF CARE MNGMENT TRAINING: CPT

## 2018-01-14 PROCEDURE — 97166 OT EVAL MOD COMPLEX 45 MIN: CPT

## 2018-01-14 PROCEDURE — G8987 SELF CARE CURRENT STATUS: HCPCS | Mod: CM

## 2018-01-14 PROCEDURE — G8989 SELF CARE D/C STATUS: HCPCS | Mod: CM

## 2018-01-14 PROCEDURE — 25000003 PHARM REV CODE 250: Performed by: STUDENT IN AN ORGANIZED HEALTH CARE EDUCATION/TRAINING PROGRAM

## 2018-01-14 PROCEDURE — 25000003 PHARM REV CODE 250: Performed by: INTERNAL MEDICINE

## 2018-01-14 PROCEDURE — 36415 COLL VENOUS BLD VENIPUNCTURE: CPT

## 2018-01-14 RX ORDER — SODIUM CHLORIDE 9 MG/ML
INJECTION, SOLUTION INTRAVENOUS CONTINUOUS
Status: ACTIVE | OUTPATIENT
Start: 2018-01-14 | End: 2018-01-15

## 2018-01-14 RX ORDER — SODIUM CHLORIDE 0.9 % (FLUSH) 0.9 %
10 SYRINGE (ML) INJECTION
Status: CANCELLED | OUTPATIENT
Start: 2018-02-06

## 2018-01-14 RX ORDER — HEPARIN 100 UNIT/ML
500 SYRINGE INTRAVENOUS
Status: CANCELLED | OUTPATIENT
Start: 2018-02-06

## 2018-01-14 RX ORDER — FAMOTIDINE 10 MG/ML
20 INJECTION INTRAVENOUS
Status: CANCELLED | OUTPATIENT
Start: 2018-02-06

## 2018-01-14 RX ORDER — ACETAMINOPHEN 325 MG/1
650 TABLET ORAL
Status: CANCELLED | OUTPATIENT
Start: 2018-02-06

## 2018-01-14 RX ADMIN — ASPIRIN 81 MG: 81 TABLET, COATED ORAL at 08:01

## 2018-01-14 RX ADMIN — GALANTAMINE HYDROBROMIDE 16 MG: 4 TABLET, FILM COATED ORAL at 05:01

## 2018-01-14 RX ADMIN — MAGNESIUM OXIDE TAB 400 MG (241.3 MG ELEMENTAL MG) 800 MG: 400 (241.3 MG) TAB at 05:01

## 2018-01-14 RX ADMIN — SODIUM CHLORIDE 500 ML: 900 INJECTION, SOLUTION INTRAVENOUS at 03:01

## 2018-01-14 RX ADMIN — GABAPENTIN 300 MG: 300 CAPSULE ORAL at 08:01

## 2018-01-14 RX ADMIN — MAGNESIUM OXIDE TAB 400 MG (241.3 MG ELEMENTAL MG) 800 MG: 400 (241.3 MG) TAB at 01:01

## 2018-01-14 RX ADMIN — MEMANTINE 10 MG: 10 TABLET ORAL at 08:01

## 2018-01-14 RX ADMIN — SODIUM CHLORIDE: 0.9 INJECTION, SOLUTION INTRAVENOUS at 05:01

## 2018-01-14 RX ADMIN — GABAPENTIN 300 MG: 300 CAPSULE ORAL at 06:01

## 2018-01-14 RX ADMIN — FAMOTIDINE 20 MG: 20 TABLET, FILM COATED ORAL at 09:01

## 2018-01-14 RX ADMIN — MORPHINE SULFATE 15 MG: 15 TABLET ORAL at 09:01

## 2018-01-14 RX ADMIN — FLUTICASONE FUROATE AND VILANTEROL TRIFENATATE 1 PUFF: 100; 25 POWDER RESPIRATORY (INHALATION) at 08:01

## 2018-01-14 RX ADMIN — GABAPENTIN 300 MG: 300 CAPSULE ORAL at 01:01

## 2018-01-14 RX ADMIN — OLANZAPINE 7.5 MG: 2.5 TABLET, FILM COATED ORAL at 08:01

## 2018-01-14 RX ADMIN — MORPHINE SULFATE 15 MG: 15 TABLET ORAL at 04:01

## 2018-01-14 RX ADMIN — FINASTERIDE 5 MG: 5 TABLET, FILM COATED ORAL at 01:01

## 2018-01-14 RX ADMIN — GALANTAMINE HYDROBROMIDE 16 MG: 4 TABLET, FILM COATED ORAL at 08:01

## 2018-01-14 RX ADMIN — FAMOTIDINE 20 MG: 20 TABLET, FILM COATED ORAL at 08:01

## 2018-01-14 RX ADMIN — MEMANTINE 10 MG: 10 TABLET ORAL at 09:01

## 2018-01-14 RX ADMIN — VENLAFAXINE HYDROCHLORIDE 150 MG: 37.5 CAPSULE, EXTENDED RELEASE ORAL at 09:01

## 2018-01-14 NOTE — PLAN OF CARE
Problem: Occupational Therapy Goal  Goal: Occupational Therapy Goal  Goals to be met by: 2/14/18     Patient will increase functional independence with ADLs by performing:    UE Dressing with Modified [place].  LE Dressing with Modified [place].  Grooming while standing at sink with Modified [place].  Toileting from toilet with Modified [place] for hygiene and clothing management.   Bathing from  edge of bed with Modified [place].  Toilet transfer to toilet with Modified [place].  Increased functional strength to WFL for B UE.  Upper extremity exercise program x15 reps per handout, with independence.    POC initiated.

## 2018-01-14 NOTE — ASSESSMENT & PLAN NOTE
Unclear etiology of tachycardia. Qtc wnl.  Denies chest pain or SOB but feels weak.  Denies dizziness at this time. Slight improvement from 140s to 130s after IVF.  May be dehydration.  Ddx includes drug related with 75mg prednisone qday.  Otherwise not compromising BP at this time.  -continue with monitoring

## 2018-01-14 NOTE — ASSESSMENT & PLAN NOTE
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 BM Bx negative. Plan for inpatient treatment with brentuximab.  -skin bx on 12/28/17- SKIN, RIGHT UPPER BACK, EXCISIONAL BIOPSY: CD30-positive T-cell lymphoproliferative disorder  -PICC line placement 1/15/18 (has port from years ago, unsure if accessible)  -Holding prednisone for concern it may have caused strain on the heart.  Holding off on brentuximab due to tachycardia 1/13/18 and 1/14/18.  Reviewed old progress notes and he had been tachycardic on previous admission in 110s.  -Viral workup (HIV, hepatitis, EBV, CMV) pending  -daily TLS labs

## 2018-01-14 NOTE — ASSESSMENT & PLAN NOTE
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 BM Bx negative. Plan for inpatient treatment with brentuximab.  -skin bx on 12/28/17- SKIN, RIGHT UPPER BACK, EXCISIONAL BIOPSY: CD30-positive T-cell lymphoproliferative disorder  -PICC line placement 1/15/18 (has port from years ago, unsure if accessible)  -continue prednisone.  Holding off on brentuximab due to tachycardia 1/13/18  -Viral workup (HIV, hepatitis, EBV, CMV) ordered  -daily TLS labs

## 2018-01-14 NOTE — ASSESSMENT & PLAN NOTE
-pt complaining of worsening orthostatic symptoms, decreased PO intake, and weakness over the past week  -maintenance fluids 75 cc/hr x12 hrs  -boost  -PT/OT consulted

## 2018-01-14 NOTE — PROGRESS NOTES
Called to room by patients wife stating he is trying to urinate and it burns, noted patient sitting on side of bed with urinal being used, only a trickle of urine noted in urinal, patient assisted to bedside commode chair to attempt urination and was unsuccessful, bladder scan done and patient noted to have >300 in bladder on 3 different scan areas, notified Dr. Fu and order received to do in and out cath and collect urnialysis. Done. Patient tolerated well. 650cc noted in  bag. Sample collected. Will continue to monitor.

## 2018-01-14 NOTE — PT/OT/SLP EVAL
Occupational Therapy   Evaluation    Name: Lane Nunez Jr.  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  Has a tub/shower combo.  Previous level of function: I PTA  Roles and Routines: Former   Equipment Owned:  walker, rolling, rollator, wheelchair, bedside commode, shower chair, cane, quad  Assistance upon Discharge: Spouse    Past Medical History:   Diagnosis Date    Cancer     CHF (congestive heart failure)     Depression     GERD (gastroesophageal reflux disease)     High cholesterol     Hypertension     Left ventricular ejection fraction less than 40% 8/23/2017    Multiple myeloma     Renal disorder     Stroke     no sequelae       Past Surgical History:   Procedure Laterality Date    BACK SURGERY  1/2000    metal julieth at T-4    CHOLECYSTECTOMY      HEMORRHOID SURGERY      KYPHOSIS SURGERY  2010    PROSTATE SURGERY      SPINE SURGERY      TONSILLECTOMY         Subjective     Chief Complaint: Pt was admitted c large cell lymphoma  Patient/Family stated goals: To get better.  Communicated with: RN prior to session.  Pain/Comfort:  · Pain Rating 1: 7/10    Objective:     Patient found with: peripheral IV    General Precautions: Standard, fall, aspiration   Orthopedic Precautions:N/A   Braces: N/A     Occupational Performance:    Bed Mobility:    · Patient completed Supine to Sit with contact guard assistance    Functional Mobility/Transfers:  · Patient completed Sit <> Stand Transfer with minimum assistance  with  rolling walker   · Patient completed Bed <> Chair Transfer using Stand Pivot technique with minimum assistance with rolling  "walker  · Patient completed Toilet Transfer Stand Pivot technique with minimum assistance with  rolling walker    Activities of Daily Living:  · UB Dressing: minimum assistance to don hospital gown.  · LB Dressing: maximal assistance to don socks.    Cognitive/Visual Perceptual:  Cognitive/Psychosocial Skills:     -       Oriented to: Person, Place, Time and Situation   -       Follows Commands/attention:Follows multistep  commands    Physical Exam:  Upper Extremity Range of Motion:     -       Right Upper Extremity: WFL  -       Left Upper Extremity: WFL  Upper Extremity Strength:    -       Right Upper Extremity: WFL  -       Left Upper Extremity: WFL    Patient left up in chair with all lines intact, call button in reach, RN notified and family present    [facility] 6 Click:  [facility] Total Score: 15      Education:    Assessment:     Lane Nunez Jr. is a 67 y.o. 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  He was able to perform supine/sit T/F c CGA and sit/stand, BSC, and bed/chair T/F c min A and RW.  Able to perform LB dressing c max A and UB dressing c min A.  Has fair dynamic standing balance at this time. Performance deficits affecting function are weakness, impaired endurance, impaired self care skills, impaired functional mobilty, impaired balance, decreased upper extremity function, abnormal tone.      Rehab Prognosis:  Good; patient would benefit from acute skilled OT services to address these deficits and reach maximum level of function.         Clinical Decision Makin.  OT Mod:  "Pt evaluation falls under moderate complexity for evaluation coding due to identification of 3-5 performance deficits noted as stated above. Eval required Min/Mod assistance to complete on this date and detailed assessment(s) were utilized. Moreover, an expanded review of history and occupational profile obtained with additional review of cognitive, " "physical and psychosocial hx."     Plan:     Patient to be seen 4 x/week to address the above listed problems via self-care/home management, therapeutic activities, therapeutic exercises  · Plan of Care Expires:    · Plan of Care Reviewed with: patient    This Plan of care has been discussed with the patient who was involved in its development and understands and is in agreement with the identified goals and treatment plan    GOALS:    Occupational Therapy Goals        Problem: Occupational Therapy Goal    Goal Priority Disciplines Outcome Interventions   Occupational Therapy Goal     OT, PT/OT     Description:  Goals to be met by: 2/14/18     Patient will increase functional independence with ADLs by performing:    UE Dressing with Modified [place].  LE Dressing with Modified [place].  Grooming while standing at sink with Modified [place].  Toileting from toilet with Modified [place] for hygiene and clothing management.   Bathing from  edge of bed with Modified [place].  Toilet transfer to toilet with Modified [place].  Increased functional strength to WFL for B UE.  Upper extremity exercise program x15 reps per handout, with independence.                      Time Tracking:     OT Date of Treatment: 01/14/18  OT Start Time: 0740  OT Stop Time: 0803  OT Total Time (min): 23 min    Billable Minutes:Evaluation 10  Self Care/Home Management 13    YASMIN Gregg  1/14/2018    "

## 2018-01-14 NOTE — PLAN OF CARE
Problem: Patient Care Overview  Goal: Plan of Care Review  Outcome: Ongoing (interventions implemented as appropriate)  Patient is oriented with short term memory loss. Wife at bedside. Reported pain at beginning of shift and receipt po morphine. Patient had problem with urination at bedtime and in and out cath had to be done. Urine sent. Will continue to monitor. Remains on ivfs @75cc/hr. No other issues noted. No falls or injuries thus far my shift. Will continue to monitor.

## 2018-01-14 NOTE — ASSESSMENT & PLAN NOTE
Unclear etiology of tachycardia. Qtc wnl.  Sinus tachycardia 1/13/18.  Denies chest pain or SOB but feels weak.  Denies dizziness at this time. Slight improvement from 140s to 130s after IVF.  May be dehydration. Otherwise not compromising BP at this time.  -Ddx includes drug related  --prednisone 75mg qday held  --other drugs : galantamine (causes bradycardia, heart block; ?possible compensatory response).  Memantine (hyper- or hypotension).  Olanzapine (conduction abnormalities), venlafaxine (tachycardia, hypotension)  ---consult psych in AM for de-escalation  -EKG 1/14/18 with lateral leads showing poor R wave progression.  Ddx ACS vs poor lead placement  --will check troponins x3 and recheck EKG in Am.  If persistent, next step may be 2d Echo to eval for new WMA   -slight improvement with hydration and review of vitals show lowest HR when sleeping  -continue with monitoring

## 2018-01-14 NOTE — ASSESSMENT & PLAN NOTE
-hypotensive on admission, holding lasix 20 mg daily  - hx of decreased EF 30-40%    --may consider repeat echo given persistent tachycardia

## 2018-01-14 NOTE — ASSESSMENT & PLAN NOTE
-had silva auto SCT 6/25/14 for myeloma, Day 1300  - 1 year restaging marrow on 7/24/15 showed 40% cellularity with trilineage hematopoiesis and no morphologic evidence of plasma cells.  Cytogenetics could not be done as the metaphase cells did not grow.   - PET CT did not reveal any evidence of myeloma at 1 year   - skeletal survey 12/28/17 was negative  - maintenance Revlimid on hold

## 2018-01-14 NOTE — SUBJECTIVE & OBJECTIVE
Subjective:     Interval History: Felt better this morning and was sitting up in the chair.  Later in the afternoon was feeling tired.  No chest pain or shortness of breath.  No fevers overnight.  Appetite remains low.    Objective:     Vital Signs (Most Recent):  Temp: 99.9 °F (37.7 °C) (01/14/18 1551)  Pulse: (!) 135 (01/14/18 1551)  Resp: 18 (01/14/18 1551)  BP: 134/81 (01/14/18 1551)  SpO2: 97 % (01/14/18 1551) Vital Signs (24h Range):  Temp:  [98 °F (36.7 °C)-99.9 °F (37.7 °C)] 99.9 °F (37.7 °C)  Pulse:  [112-145] 135  Resp:  [18-20] 18  SpO2:  [94 %-97 %] 97 %  BP: ()/(61-85) 134/81     Weight: 99.5 kg (219 lb 5.7 oz)  Body mass index is 28.94 kg/m².  Body surface area is 2.26 meters squared.    ECOG SCORE         [unfilled]    Intake/Output - Last 3 Shifts       01/12 0700 - 01/13 0659 01/13 0700 - 01/14 0659 01/14 0700 - 01/15 0659    P.O.  767     I.V. (mL/kg) 580 (5.8) 1123.8 (11.3)     Total Intake(mL/kg) 580 (5.8) 1890.8 (19)     Urine (mL/kg/hr) 450 1525 (0.6) 425 (0.5)    Total Output 450 1525 425    Net +130 +365.8 -425           Urine Occurrence 2 x 1 x           Physical Exam   Constitutional: He appears well-developed.   HENT:   Head: Normocephalic.   Eyes: EOM are normal. Pupils are equal, round, and reactive to light.   Neck: Normal range of motion. No tracheal deviation present.   Cardiovascular: Regular rhythm and normal heart sounds.  Exam reveals no gallop and no friction rub.    No murmur heard.  tachycardic   Pulmonary/Chest: Effort normal and breath sounds normal. No respiratory distress. He has no wheezes. He has no rales.   Abdominal: Soft. Bowel sounds are normal. He exhibits no distension and no mass. There is no tenderness. There is no guarding.   Musculoskeletal: He exhibits no edema.   Neurological: He is alert.   Skin: Skin is warm and dry.   Multiple areas of erythematous papular and vesicular lesions throughout body       Significant Labs:   CBC:   Recent Labs  Lab  01/12/18 1857 01/13/18 0418 01/14/18  0547   WBC 8.52 7.51 7.33   HGB 8.4* 7.8* 8.1*   HCT 25.5* 23.6* 25.7*   PLT 30* 29* 26*   , CMP:   Recent Labs  Lab 01/12/18 1857 01/13/18 0418 01/14/18  0547    137 138   K 4.7 4.1 4.0    103 103   CO2 26 25 27   * 87 79   BUN 19 21 15   CREATININE 1.0 0.9 0.8   CALCIUM 9.5 9.2 9.0   PROT 6.9 6.2 6.1   ALBUMIN 2.2* 2.0* 1.9*   BILITOT 0.5 0.4 0.6   ALKPHOS 220* 194* 178*   AST 22 21 15   ALT 25 20 17   ANIONGAP 9 9 8   EGFRNONAA >60.0 >60.0 >60.0    and Coagulation:   Recent Labs  Lab 01/12/18 1857 01/13/18 0418 01/14/18  0547   INR 1.0 0.9 1.0   APTT 22.7  --   --        Diagnostic Results:  I have reviewed all pertinent imaging results/findings within the past 24 hours.

## 2018-01-14 NOTE — NURSING
Bmt team called , spoke withDr. Caba notifed Dr. Martin of hear rate 145 at this time and b/p=98/61.  Will continue to monitor.

## 2018-01-14 NOTE — PROGRESS NOTES
[facility]  Hematology  Bone Marrow Transplant  Progress Note    Patient Name: Lane Nunez Jr.  Admission Date: 1/12/2018  Hospital Length of Stay: 1 days  Code Status: DNR  No new subjective & objective note has been filed under this hospital service since the last note was generated.    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 BM Bx negative. Plan for inpatient treatment with brentuximab.  -skin bx on 12/28/17- SKIN, RIGHT UPPER BACK, EXCISIONAL BIOPSY: CD30-positive T-cell lymphoproliferative disorder  -PICC line placement 1/15/18 (has port from years ago, unsure if accessible)  -d/c prednisone (see tachycardia).  Holding off on brentuximab due to tachycardia 1/13/18  -Viral workup (HIV, hepatitis, EBV, CMV) ordered  -daily TLS labs          Tachycardia    Unclear etiology of tachycardia. Qtc wnl.  Denies chest pain or SOB but feels weak.  Denies dizziness at this time. Slight improvement from 140s to 130s after IVF.  May be dehydration.  Ddx includes drug related with 75mg prednisone qday.  Otherwise not compromising BP at this time.  -continue with monitoring        Glaucoma    -continue home eye drops         Neuropathy    -continue home gabapentin  - PT/OT, will likely need home health        Dysphagia    - possibly secondary to dementia  - He has refused a G-tube on multiple occasions in the past per chart  - During last admission, patient had regular diet with nectar thick liquids and aspiration precautions          Multiple myeloma in remission    See oncologic hx on H&P for more information  - had silva auto 6/25/14   - was in CR at 1 year  - maintenance Revlimid on hold for 4 weeks due to thrombocytopenia. Will continue to hold at this time  - now with BLE weakness, inability to walk--Will likely need PT/OT after therapy  - recent SPEP with normal protein, FLC from 12/28 with kappa light chain of  4.3, ratio of 1.40   - bone marrow biopsy 12/28/17 : NO MORPHOLOGIC OR IMMUNOPHENOTYPIC EVIDENCE OF RESIDUAL PLASMA CELL NEOPLASM.  -- NO MORPHOLOGIC OR IMMUNOPHENOTYPIC EVIDENCE OF MARROW INVOLVEMENT BY  CD30-POSITIVE T-CELL LYMPHOPROLIFERATIVE DISORDER        Cytopenia    Anemia and thrombocytopenia likely 2/2 disease  - Revlimid has been on hold due to thrombocytopenia  - Transfuse for hgb <7 g/dL, plt <10 k/uL         Cardiomyopathy    -hypotensive on admission, holding lasix 20 mg daily  - hx of decreased EF 30-40%          Hypertension    -hypotensive on admission, holding home coreg and lisinopril           History of peripheral stem cell transplant    -had silva auto SCT 6/25/14 for myeloma, 3.5 years post transplant  - 1 year restaging marrow on 7/24/15 showed 40% cellularity with trilineage hematopoiesis and no morphologic evidence of plasma cells.  Cytogenetics could not be done as the metaphase cells did not grow.   - PET CT did not reveal any evidence of myeloma at 1 year   - skeletal survey 12/28/17 was negative  - maintenance Revlimid on hold         Debility    -pt complaining of worsening orthostatic symptoms, decreased PO intake, and weakness over the past week  -maintenance fluids 75 cc/hr  -boost  -PT/OT consulted        BPH (benign prostatic hyperplasia)    -continue home finasteride           Memory impairment    - pt with demenia  - continue home memantine and galantamine          Depression    -Continue home amitriptyline, olanzapine             VTE Risk Mitigation         Ordered     Medium Risk of VTE  Once      01/12/18 1740     Place sequential compression device  Until discontinued      01/12/18 1740          Disposition: inpatient    Russel Martin MD  Bone Marrow Transplant  [facility]

## 2018-01-14 NOTE — NURSING
Patient was bladder scanned after only urinating little this morning and little later this afternoon.  The bladder scan was done and showed 260cc and 280cc in two different locations.  Patient had a in and out catheter done and noted 500cc yellow urine in gu bag.  Dr. Martin was notified.

## 2018-01-14 NOTE — PLAN OF CARE
Problem: Patient Care Overview  Goal: Plan of Care Review  Outcome: Ongoing (interventions implemented as appropriate)  Plan of care reviewed with patient and family.  Fall precautions maintained, side rails up x2 , call light in reach, bed in low position and locked, nonskid socks on.  Encouraged to eat a regular diet.  Getting iv fluids.  Ekg done at this time.  500cc bolus of normal saline given.  Complained of pain earlier today and got some relief.  Will continue monitor.

## 2018-01-15 PROBLEM — D69.6 THROMBOCYTOPENIA: Status: ACTIVE | Noted: 2018-01-15

## 2018-01-15 PROBLEM — K59.00 CONSTIPATION: Status: ACTIVE | Noted: 2018-01-15

## 2018-01-15 PROBLEM — D69.3 CHRONIC ITP (IDIOPATHIC THROMBOCYTOPENIA): Status: RESOLVED | Noted: 2018-01-15 | Resolved: 2018-01-15

## 2018-01-15 PROBLEM — D75.829 THROMBOCYTOPENIA, HEPARIN-INDUCED (HIT): Status: ACTIVE | Noted: 2018-01-15

## 2018-01-15 PROBLEM — R50.9 FEVER: Status: ACTIVE | Noted: 2018-01-15

## 2018-01-15 PROBLEM — D69.3 CHRONIC ITP (IDIOPATHIC THROMBOCYTOPENIA): Status: ACTIVE | Noted: 2018-01-15

## 2018-01-15 LAB
ALBUMIN SERPL BCP-MCNC: 1.8 G/DL
ALP SERPL-CCNC: 158 U/L
ALT SERPL W/O P-5'-P-CCNC: 17 U/L
ANION GAP SERPL CALC-SCNC: 11 MMOL/L
ANISOCYTOSIS BLD QL SMEAR: SLIGHT
AST SERPL-CCNC: 21 U/L
BASOPHILS # BLD AUTO: 0.01 K/UL
BASOPHILS NFR BLD: 0.2 %
BILIRUB SERPL-MCNC: 0.5 MG/DL
BILIRUB UR QL STRIP: NEGATIVE
BUN SERPL-MCNC: 14 MG/DL
BURR CELLS BLD QL SMEAR: ABNORMAL
CALCIUM SERPL-MCNC: 8.9 MG/DL
CHLORIDE SERPL-SCNC: 104 MMOL/L
CLARITY UR REFRACT.AUTO: ABNORMAL
CMV DNA SERPL NAA+PROBE-ACNC: NORMAL IU/ML
CO2 SERPL-SCNC: 22 MMOL/L
COLOR UR AUTO: YELLOW
CREAT SERPL-MCNC: 0.9 MG/DL
DACRYOCYTES BLD QL SMEAR: ABNORMAL
DIFFERENTIAL METHOD: ABNORMAL
EBV DNA # BLD NAA+PROBE: NORMAL {COPIES}/ML
EBV DNA BY PCR: NORMAL
EOSINOPHIL # BLD AUTO: 0 K/UL
EOSINOPHIL NFR BLD: 0.3 %
ERYTHROCYTE [DISTWIDTH] IN BLOOD BY AUTOMATED COUNT: 18 %
EST. GFR  (AFRICAN AMERICAN): >60 ML/MIN/1.73 M^2
EST. GFR  (NON AFRICAN AMERICAN): >60 ML/MIN/1.73 M^2
GLUCOSE SERPL-MCNC: 109 MG/DL
GLUCOSE UR QL STRIP: NEGATIVE
HAV IGM SERPL QL IA: NEGATIVE
HBV CORE IGM SERPL QL IA: NEGATIVE
HBV SURFACE AG SERPL QL IA: NEGATIVE
HCT VFR BLD AUTO: 24.7 %
HCV AB SERPL QL IA: NEGATIVE
HGB BLD-MCNC: 7.8 G/DL
HGB UR QL STRIP: ABNORMAL
HIV 1+2 AB+HIV1 P24 AG SERPL QL IA: NEGATIVE
HYALINE CASTS UR QL AUTO: 4 /LPF
HYPOCHROMIA BLD QL SMEAR: ABNORMAL
IMM GRANULOCYTES # BLD AUTO: 0.02 K/UL
IMM GRANULOCYTES NFR BLD AUTO: 0.3 %
INR PPP: 1
KETONES UR QL STRIP: NEGATIVE
LACTATE SERPL-SCNC: 1.8 MMOL/L
LDH SERPL L TO P-CCNC: 1325 U/L
LDH SERPL L TO P-CCNC: 1353 U/L
LEUKOCYTE ESTERASE UR QL STRIP: NEGATIVE
LOWER 95% CONFIDENCE INTERVAL: NORMAL
LYMPHOCYTES # BLD AUTO: 1.3 K/UL
LYMPHOCYTES NFR BLD: 19.4 %
MAGNESIUM SERPL-MCNC: 1.8 MG/DL
MCH RBC QN AUTO: 31.1 PG
MCHC RBC AUTO-ENTMCNC: 31.6 G/DL
MCV RBC AUTO: 98 FL
MICROSCOPIC COMMENT: ABNORMAL
MONOCYTES # BLD AUTO: 0.8 K/UL
MONOCYTES NFR BLD: 12.5 %
NEUTROPHILS # BLD AUTO: 4.4 K/UL
NEUTROPHILS NFR BLD: 67.3 %
NITRITE UR QL STRIP: NEGATIVE
NRBC BLD-RTO: 0 /100 WBC
OVALOCYTES BLD QL SMEAR: ABNORMAL
PH UR STRIP: 5 [PH] (ref 5–8)
PHOSPHATE SERPL-MCNC: 3.2 MG/DL
PLATELET # BLD AUTO: 26 K/UL
PLATELET BLD QL SMEAR: ABNORMAL
PMV BLD AUTO: ABNORMAL FL
POIKILOCYTOSIS BLD QL SMEAR: SLIGHT
POLYCHROMASIA BLD QL SMEAR: ABNORMAL
POTASSIUM SERPL-SCNC: 4.2 MMOL/L
PROT SERPL-MCNC: 6 G/DL
PROT UR QL STRIP: NEGATIVE
PROTHROMBIN TIME: 10.9 SEC
RBC # BLD AUTO: 2.51 M/UL
RBC #/AREA URNS AUTO: >100 /HPF (ref 0–4)
SODIUM SERPL-SCNC: 137 MMOL/L
SP GR UR STRIP: 1.01 (ref 1–1.03)
TROPONIN I SERPL DL<=0.01 NG/ML-MCNC: 0.01 NG/ML
TROPONIN I SERPL DL<=0.01 NG/ML-MCNC: 0.02 NG/ML
UPPER 95% CONFIDENCE INTERVAL: NORMAL
URATE SERPL-MCNC: 5.3 MG/DL
URN SPEC COLLECT METH UR: ABNORMAL
UROBILINOGEN UR STRIP-ACNC: 4 EU/DL
WBC # BLD AUTO: 6.56 K/UL
WBC #/AREA URNS AUTO: 32 /HPF (ref 0–5)

## 2018-01-15 PROCEDURE — 25000003 PHARM REV CODE 250: Performed by: STUDENT IN AN ORGANIZED HEALTH CARE EDUCATION/TRAINING PROGRAM

## 2018-01-15 PROCEDURE — 83605 ASSAY OF LACTIC ACID: CPT

## 2018-01-15 PROCEDURE — 83615 LACTATE (LD) (LDH) ENZYME: CPT

## 2018-01-15 PROCEDURE — 25000003 PHARM REV CODE 250: Performed by: INTERNAL MEDICINE

## 2018-01-15 PROCEDURE — 97110 THERAPEUTIC EXERCISES: CPT

## 2018-01-15 PROCEDURE — 84550 ASSAY OF BLOOD/URIC ACID: CPT

## 2018-01-15 PROCEDURE — 36415 COLL VENOUS BLD VENIPUNCTURE: CPT

## 2018-01-15 PROCEDURE — 87632 RESP VIRUS 6-11 TARGETS: CPT

## 2018-01-15 PROCEDURE — 85025 COMPLETE CBC W/AUTO DIFF WBC: CPT

## 2018-01-15 PROCEDURE — 80053 COMPREHEN METABOLIC PANEL: CPT

## 2018-01-15 PROCEDURE — 87086 URINE CULTURE/COLONY COUNT: CPT

## 2018-01-15 PROCEDURE — 85610 PROTHROMBIN TIME: CPT

## 2018-01-15 PROCEDURE — 63600175 PHARM REV CODE 636 W HCPCS: Performed by: STUDENT IN AN ORGANIZED HEALTH CARE EDUCATION/TRAINING PROGRAM

## 2018-01-15 PROCEDURE — 20600001 HC STEP DOWN PRIVATE ROOM

## 2018-01-15 PROCEDURE — 25500020 PHARM REV CODE 255: Performed by: INTERNAL MEDICINE

## 2018-01-15 PROCEDURE — 83735 ASSAY OF MAGNESIUM: CPT

## 2018-01-15 PROCEDURE — 84484 ASSAY OF TROPONIN QUANT: CPT

## 2018-01-15 PROCEDURE — 81001 URINALYSIS AUTO W/SCOPE: CPT

## 2018-01-15 PROCEDURE — 83615 LACTATE (LD) (LDH) ENZYME: CPT | Mod: 91

## 2018-01-15 PROCEDURE — 84100 ASSAY OF PHOSPHORUS: CPT

## 2018-01-15 PROCEDURE — 99233 SBSQ HOSP IP/OBS HIGH 50: CPT | Mod: ,,, | Performed by: INTERNAL MEDICINE

## 2018-01-15 PROCEDURE — 84484 ASSAY OF TROPONIN QUANT: CPT | Mod: 91

## 2018-01-15 RX ORDER — AMOXICILLIN 250 MG
1 CAPSULE ORAL DAILY
Status: DISCONTINUED | OUTPATIENT
Start: 2018-01-15 | End: 2018-01-23 | Stop reason: HOSPADM

## 2018-01-15 RX ORDER — POLYETHYLENE GLYCOL 3350 17 G/17G
17 POWDER, FOR SOLUTION ORAL DAILY
Status: DISCONTINUED | OUTPATIENT
Start: 2018-01-15 | End: 2018-01-16

## 2018-01-15 RX ORDER — VANCOMYCIN HCL IN 5 % DEXTROSE 1.5G/250ML
15 PLASTIC BAG, INJECTION (ML) INTRAVENOUS
Status: DISCONTINUED | OUTPATIENT
Start: 2018-01-15 | End: 2018-01-15

## 2018-01-15 RX ORDER — OSELTAMIVIR PHOSPHATE 75 MG/1
75 CAPSULE ORAL 2 TIMES DAILY
Status: DISCONTINUED | OUTPATIENT
Start: 2018-01-15 | End: 2018-01-17

## 2018-01-15 RX ORDER — CEFEPIME HYDROCHLORIDE 2 G/50ML
2 INJECTION, SOLUTION INTRAVENOUS
Status: DISCONTINUED | OUTPATIENT
Start: 2018-01-15 | End: 2018-01-15

## 2018-01-15 RX ORDER — SODIUM CHLORIDE 9 MG/ML
INJECTION, SOLUTION INTRAVENOUS CONTINUOUS
Status: ACTIVE | OUTPATIENT
Start: 2018-01-15 | End: 2018-01-15

## 2018-01-15 RX ORDER — CEFEPIME HYDROCHLORIDE 1 G/50ML
1 INJECTION, SOLUTION INTRAVENOUS
Status: DISCONTINUED | OUTPATIENT
Start: 2018-01-15 | End: 2018-01-18

## 2018-01-15 RX ADMIN — OSELTAMIVIR PHOSPHATE 75 MG: 75 CAPSULE ORAL at 08:01

## 2018-01-15 RX ADMIN — OLANZAPINE 7.5 MG: 2.5 TABLET, FILM COATED ORAL at 08:01

## 2018-01-15 RX ADMIN — MORPHINE SULFATE 15 MG: 15 TABLET ORAL at 05:01

## 2018-01-15 RX ADMIN — MEMANTINE 10 MG: 10 TABLET ORAL at 08:01

## 2018-01-15 RX ADMIN — GALANTAMINE HYDROBROMIDE 16 MG: 4 TABLET, FILM COATED ORAL at 08:01

## 2018-01-15 RX ADMIN — POLYETHYLENE GLYCOL 3350 17 G: 17 POWDER, FOR SOLUTION ORAL at 10:01

## 2018-01-15 RX ADMIN — Medication 1500 MG: at 12:01

## 2018-01-15 RX ADMIN — SODIUM CHLORIDE: 0.9 INJECTION, SOLUTION INTRAVENOUS at 10:01

## 2018-01-15 RX ADMIN — MORPHINE SULFATE 15 MG: 15 TABLET ORAL at 02:01

## 2018-01-15 RX ADMIN — GABAPENTIN 300 MG: 300 CAPSULE ORAL at 08:01

## 2018-01-15 RX ADMIN — GABAPENTIN 300 MG: 300 CAPSULE ORAL at 05:01

## 2018-01-15 RX ADMIN — GABAPENTIN 300 MG: 300 CAPSULE ORAL at 02:01

## 2018-01-15 RX ADMIN — ASPIRIN 81 MG: 81 TABLET, COATED ORAL at 05:01

## 2018-01-15 RX ADMIN — GALANTAMINE HYDROBROMIDE 16 MG: 4 TABLET, FILM COATED ORAL at 06:01

## 2018-01-15 RX ADMIN — SODIUM CHLORIDE 500 ML: 0.9 INJECTION, SOLUTION INTRAVENOUS at 12:01

## 2018-01-15 RX ADMIN — FINASTERIDE 5 MG: 5 TABLET, FILM COATED ORAL at 02:01

## 2018-01-15 RX ADMIN — CEFEPIME HYDROCHLORIDE 2 G: 2 INJECTION, SOLUTION INTRAVENOUS at 02:01

## 2018-01-15 RX ADMIN — CEFEPIME HYDROCHLORIDE 1 G: 1 INJECTION, SOLUTION INTRAVENOUS at 05:01

## 2018-01-15 RX ADMIN — SODIUM CHLORIDE 500 ML: 900 INJECTION, SOLUTION INTRAVENOUS at 03:01

## 2018-01-15 RX ADMIN — FAMOTIDINE 20 MG: 20 TABLET, FILM COATED ORAL at 08:01

## 2018-01-15 RX ADMIN — CEFEPIME HYDROCHLORIDE 1 G: 1 INJECTION, SOLUTION INTRAVENOUS at 11:01

## 2018-01-15 RX ADMIN — STANDARDIZED SENNA CONCENTRATE AND DOCUSATE SODIUM 1 TABLET: 8.6; 5 TABLET, FILM COATED ORAL at 10:01

## 2018-01-15 RX ADMIN — CYCLOBENZAPRINE HYDROCHLORIDE 5 MG: 5 TABLET, FILM COATED ORAL at 05:01

## 2018-01-15 RX ADMIN — ACETAMINOPHEN 650 MG: 325 TABLET ORAL at 12:01

## 2018-01-15 RX ADMIN — FLUTICASONE FUROATE AND VILANTEROL TRIFENATATE 1 PUFF: 100; 25 POWDER RESPIRATORY (INHALATION) at 08:01

## 2018-01-15 RX ADMIN — IOHEXOL 10 ML: 300 INJECTION, SOLUTION INTRAVENOUS at 12:01

## 2018-01-15 RX ADMIN — ASPIRIN 81 MG: 81 TABLET, COATED ORAL at 08:01

## 2018-01-15 RX ADMIN — MORPHINE SULFATE 15 MG: 15 TABLET ORAL at 08:01

## 2018-01-15 RX ADMIN — VENLAFAXINE HYDROCHLORIDE 150 MG: 37.5 CAPSULE, EXTENDED RELEASE ORAL at 08:01

## 2018-01-15 NOTE — SUBJECTIVE & OBJECTIVE
Subjective:     Interval History: Febrile overnight.  Wife also reports constipation.  Straight cath'd for 500cc 1/14/18.  Dysuria since getting straight cath'd.    Objective:     Vital Signs (Most Recent):  Temp: 99.2 °F (37.3 °C) (01/15/18 1555)  Pulse: (!) 141 (01/15/18 1555)  Resp: 18 (01/15/18 1555)  BP: 137/80 (01/15/18 1555)  SpO2: 96 % (01/15/18 1555) Vital Signs (24h Range):  Temp:  [97.6 °F (36.4 °C)-102.1 °F (38.9 °C)] 99.2 °F (37.3 °C)  Pulse:  [118-145] 141  Resp:  [18-20] 18  SpO2:  [94 %-97 %] 96 %  BP: (101-137)/(61-81) 137/80     Weight: 99.5 kg (219 lb 5.7 oz)  Body mass index is 28.94 kg/m².  Body surface area is 2.26 meters squared.    ECOG SCORE         [unfilled]    Intake/Output - Last 3 Shifts       01/13 0700 - 01/14 0659 01/14 0700 - 01/15 0659 01/15 0700 - 01/16 0659    P.O. 767 1060     I.V. (mL/kg) 1123.8 (11.3) 742.5 (7.5) 270 (2.7)    IV Piggyback  800 50    Total Intake(mL/kg) 1890.8 (19) 2602.5 (26.2) 320 (3.2)    Urine (mL/kg/hr) 1525 (0.6) 1525 (0.6) 800 (0.8)    Total Output 1525 1525 800    Net +365.8 +1077.5 -480           Urine Occurrence 1 x            Physical Exam   Constitutional: He appears well-developed.   HENT:   Head: Normocephalic.   Eyes: EOM are normal. Pupils are equal, round, and reactive to light.   Neck: Normal range of motion. No tracheal deviation present.   Cardiovascular: Regular rhythm and normal heart sounds.  Exam reveals no gallop and no friction rub.    No murmur heard.  tachycardic   Pulmonary/Chest: Effort normal and breath sounds normal. No respiratory distress. He has no wheezes. He has no rales.   Abdominal: Soft. Bowel sounds are normal. He exhibits no distension and no mass. There is no tenderness. There is no guarding.   Musculoskeletal: He exhibits no edema.   Neurological: He is alert.   Slight left sided droop (baseline per wife) and right pupil slightly larger than left.  No other focal deficits.   Skin: Skin is warm and dry.   Multiple  areas of erythematous papular and vesicular lesions throughout body       Significant Labs:   CBC:   Recent Labs  Lab 01/14/18  0547 01/15/18  0440   WBC 7.33 6.56   HGB 8.1* 7.8*   HCT 25.7* 24.7*   PLT 26* 26*   , CMP:   Recent Labs  Lab 01/14/18  0547 01/15/18  0440    137   K 4.0 4.2    104   CO2 27 22*   GLU 79 109   BUN 15 14   CREATININE 0.8 0.9   CALCIUM 9.0 8.9   PROT 6.1 6.0   ALBUMIN 1.9* 1.8*   BILITOT 0.6 0.5   ALKPHOS 178* 158*   AST 15 21   ALT 17 17   ANIONGAP 8 11   EGFRNONAA >60.0 >60.0    and Coagulation:   Recent Labs  Lab 01/14/18  0547 01/15/18  0440   INR 1.0 1.0       Diagnostic Results:  None

## 2018-01-15 NOTE — ASSESSMENT & PLAN NOTE
Febrile 1/14/18.  With urinary retention, suspect UTI as leading source.  Differentials include oral with dental caries seen on physical exam.  He denies URI symptoms and cxr without infiltrate.  -follow up blood cx x2  -continue with abx.  Day 2 of cefepime and vancomycin  --consider de-escalation of vancomycin tomorrow

## 2018-01-15 NOTE — PROGRESS NOTES
[facility]  Hematology  Bone Marrow Transplant  Progress Note    Patient Name: Lane Nunez Jr.  Admission Date: 1/12/2018  Hospital Length of Stay: 2 days  Code Status: DNR    Subjective:     Interval History: Felt better this morning and was sitting up in the chair.  Later in the afternoon was feeling tired.  No chest pain or shortness of breath.  No fevers overnight.  Appetite remains low.    Objective:     Vital Signs (Most Recent):  Temp: 99.9 °F (37.7 °C) (01/14/18 1551)  Pulse: (!) 135 (01/14/18 1551)  Resp: 18 (01/14/18 1551)  BP: 134/81 (01/14/18 1551)  SpO2: 97 % (01/14/18 1551) Vital Signs (24h Range):  Temp:  [98 °F (36.7 °C)-99.9 °F (37.7 °C)] 99.9 °F (37.7 °C)  Pulse:  [112-145] 135  Resp:  [18-20] 18  SpO2:  [94 %-97 %] 97 %  BP: ()/(61-85) 134/81     Weight: 99.5 kg (219 lb 5.7 oz)  Body mass index is 28.94 kg/m².  Body surface area is 2.26 meters squared.    ECOG SCORE         [unfilled]    Intake/Output - Last 3 Shifts       01/12 0700 - 01/13 0659 01/13 0700 - 01/14 0659 01/14 0700 - 01/15 0659    P.O.  767     I.V. (mL/kg) 580 (5.8) 1123.8 (11.3)     Total Intake(mL/kg) 580 (5.8) 1890.8 (19)     Urine (mL/kg/hr) 450 1525 (0.6) 425 (0.5)    Total Output 450 1525 425    Net +130 +365.8 -425           Urine Occurrence 2 x 1 x           Physical Exam   Constitutional: He appears well-developed.   HENT:   Head: Normocephalic.   Eyes: EOM are normal. Pupils are equal, round, and reactive to light.   Neck: Normal range of motion. No tracheal deviation present.   Cardiovascular: Regular rhythm and normal heart sounds.  Exam reveals no gallop and no friction rub.    No murmur heard.  tachycardic   Pulmonary/Chest: Effort normal and breath sounds normal. No respiratory distress. He has no wheezes. He has no rales.   Abdominal: Soft. Bowel sounds are normal. He exhibits no distension and no mass. There is no tenderness. There is no guarding.   Musculoskeletal: He exhibits no edema.    Neurological: He is alert.   Skin: Skin is warm and dry.   Multiple areas of erythematous papular and vesicular lesions throughout body       Significant Labs:   CBC:   Recent Labs  Lab 01/12/18 1857 01/13/18 0418 01/14/18  0547   WBC 8.52 7.51 7.33   HGB 8.4* 7.8* 8.1*   HCT 25.5* 23.6* 25.7*   PLT 30* 29* 26*   , CMP:   Recent Labs  Lab 01/12/18 1857 01/13/18 0418 01/14/18  0547    137 138   K 4.7 4.1 4.0    103 103   CO2 26 25 27   * 87 79   BUN 19 21 15   CREATININE 1.0 0.9 0.8   CALCIUM 9.5 9.2 9.0   PROT 6.9 6.2 6.1   ALBUMIN 2.2* 2.0* 1.9*   BILITOT 0.5 0.4 0.6   ALKPHOS 220* 194* 178*   AST 22 21 15   ALT 25 20 17   ANIONGAP 9 9 8   EGFRNONAA >60.0 >60.0 >60.0    and Coagulation:   Recent Labs  Lab 01/12/18 1857 01/13/18 0418 01/14/18  0547   INR 1.0 0.9 1.0   APTT 22.7  --   --        Diagnostic Results:  I have reviewed all pertinent imaging results/findings within the past 24 hours.    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 BM Bx negative. Plan for inpatient treatment with brentuximab.  -skin bx on 12/28/17- SKIN, RIGHT UPPER BACK, EXCISIONAL BIOPSY: CD30-positive T-cell lymphoproliferative disorder  -PICC line placement 1/15/18 (has port from years ago, unsure if accessible)  -Holding prednisone for concern it may have caused strain on the heart.  Holding off on brentuximab due to tachycardia 1/13/18 and 1/14/18.  Reviewed old progress notes and he had been tachycardic on previous admission in 110s.  -Viral workup (HIV, hepatitis, EBV, CMV) pending  -daily TLS labs          Tachycardia    Unclear etiology of tachycardia. Qtc wnl.  Sinus tachycardia 1/13/18.  Denies chest pain or SOB but feels weak.  Denies dizziness at this time. Slight improvement from 140s to 130s after IVF.  May be dehydration. Otherwise not compromising BP at this time.  -Ddx includes drug related  --prednisone 75mg  qday held  --other drugs : galantamine (causes bradycardia, heart block; ?possible compensatory response).  Memantine (hyper- or hypotension).  Olanzapine (conduction abnormalities), venlafaxine (tachycardia, hypotension)  ---consult psych in AM for de-escalation  -EKG 1/14/18 with lateral leads showing poor R wave progression.  Ddx ACS vs poor lead placement  --will check troponins x3 and recheck EKG in Am.  If persistent, next step may be 2d Echo to eval for new WMA   -slight improvement with hydration and review of vitals show lowest HR when sleeping  -continue with monitoring        Glaucoma    -continue home eye drops         Neuropathy    -continue home gabapentin  - PT/OT, will likely need home health        Dysphagia    - possibly secondary to dementia  - He has refused a G-tube on multiple occasions in the past per chart  - During last admission, patient had regular diet with nectar thick liquids and aspiration precautions          Multiple myeloma in remission    See oncologic hx on H&P for more information  - had silva auto 6/25/14   - was in CR at 1 year  - maintenance Revlimid on hold for 4 weeks due to thrombocytopenia. Will continue to hold at this time  - now with BLE weakness, inability to walk--Will likely need PT/OT after therapy  - recent SPEP with normal protein, FLC from 12/28 with kappa light chain of 4.3, ratio of 1.40   - bone marrow biopsy 12/28/17 : NO MORPHOLOGIC OR IMMUNOPHENOTYPIC EVIDENCE OF RESIDUAL PLASMA CELL NEOPLASM.  -- NO MORPHOLOGIC OR IMMUNOPHENOTYPIC EVIDENCE OF MARROW INVOLVEMENT BY  CD30-POSITIVE T-CELL LYMPHOPROLIFERATIVE DISORDER        Cytopenia    Anemia and thrombocytopenia likely 2/2 disease  - Revlimid has been on hold due to thrombocytopenia  - Transfuse for hgb <7 g/dL, plt <10 k/uL         Cardiomyopathy    -hypotensive on admission, holding lasix 20 mg daily  - hx of decreased EF 30-40%    --may consider repeat echo given persistent tachycardia        Hypertension     -hypotensive on admission, holding home coreg and lisinopril           History of peripheral stem cell transplant    -had silva auto SCT 6/25/14 for myeloma, Day 1300  - 1 year restaging marrow on 7/24/15 showed 40% cellularity with trilineage hematopoiesis and no morphologic evidence of plasma cells.  Cytogenetics could not be done as the metaphase cells did not grow.   - PET CT did not reveal any evidence of myeloma at 1 year   - skeletal survey 12/28/17 was negative  - maintenance Revlimid on hold         Debility    -pt complaining of worsening orthostatic symptoms, decreased PO intake, and weakness over the past week  -maintenance fluids 75 cc/hr x12 hrs  -boost  -PT/OT consulted        BPH (benign prostatic hyperplasia)    -continue home finasteride           Memory impairment    - pt with dementia  - continue home memantine and galantamine         Depression    -Continue home amitriptyline, olanzapine, and tachycardia            VTE Risk Mitigation         Ordered     Medium Risk of VTE  Once      01/12/18 1740     Place sequential compression device  Until discontinued      01/12/18 1740          Disposition: inpatient    Russel Martin MD  Bone Marrow Transplant  [facility]

## 2018-01-15 NOTE — ASSESSMENT & PLAN NOTE
-had silva auto SCT 6/25/14 for myeloma, Day 1301  - 1 year restaging marrow on 7/24/15 showed 40% cellularity with trilineage hematopoiesis and no morphologic evidence of plasma cells.  Cytogenetics could not be done as the metaphase cells did not grow. SPEP/ELENO 12/28/17 without monoclonal peaks. FLC from 12/28 with kappa light chain of 4.3, ratio of 1.40   - PET CT did not reveal any evidence of myeloma at 1 year   - skeletal survey 12/28/17 was negative  - maintenance Revlimid on hold

## 2018-01-15 NOTE — PLAN OF CARE
Problem: Patient Care Overview  Goal: Plan of Care Review  Outcome: Ongoing (interventions implemented as appropriate)  Fall, pressure ulcer, and infection preventions continued. Urine culture and nasal swab sent to the lab. PRN medication given for pain. Cefepime and vancomycin adjusted. Patient remains tachycardic, increasing tachycardia in the afternoon, MD aware. 500ml NS bolus given. Patient up with PT, heart rate increased into the 150s. Patient able to sit in the chair for awhile. Patient stable, will continue to monitor.

## 2018-01-15 NOTE — PROGRESS NOTES
01/15/18 1500   Vital Signs   Pulse (!) 140   Dr. Martin notified of increased and sustained tachycardia, NS bolus to be given.

## 2018-01-15 NOTE — PT/OT/SLP PROGRESS
Physical Therapy Treatment    Patient Name:  Lane Nunez Jr.   MRN:  [id number]    Recommendations:     Discharge Recommendations:  nursing facility, skilled   Discharge Equipment Recommendations: none   Barriers to discharge: Decreased caregiver support (at current functional level)    Assessment:     Lane Nunez Jr. is a 67 y.o. 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  He presents with the following impairments/functional limitations:  weakness, impaired functional mobilty, impaired cardiopulmonary response to activity, decreased safety awareness, impaired endurance, gait instability, impaired balance, impaired self care skills. Pt progressing functional mobility, as he was able to perform mobility with less assist needed and increased ambulation distance this session. Pt required assist of 2 to transfer sit>stand 2* LE weakness and decreased balance. Pt with episode of dizziness during 1st standing trial, requiring increased time before progressing mobility. Ambulation distance remains limited 2* impaired endurance, fatigue, and weakness. Pt would continue to benefit from skilled acute PT in order to address current deficits and progress functional mobility.     Rehab Prognosis:  good; patient would benefit from acute skilled PT services to address these deficits and reach maximum level of function.      Recent Surgery: * No surgery found *      Plan:     During this hospitalization, patient to be seen 3 x/week to address the above listed problems via gait training, therapeutic activities, therapeutic exercises, wheelchair management/training  · Plan of Care Expires:  02/11/18   Plan of Care Reviewed with: patient, spouse    Subjective     Communicated with RN prior to session.  Patient found supine upon PT entry to room, agreeable to treatment.      Patient comments/goals: Pt reported feeling cold.   Pain/Comfort:  · Pain Rating  1: 0/10    Patients cultural, spiritual, Oriental orthodox conflicts given the current situation: none noted    Objective:     Patient found with: peripheral IV, telemetry     General Precautions: Standard, fall, aspiration   Orthopedic Precautions:N/A   Braces: N/A     Functional Mobility:  · Bed Mobility:     · Supine to Sit: stand by assistance with increased time and with HOB elevated  · Transfers:     · Sit to Stand:  minimum assistance of 2 persons with rolling walker, x2 reps  · Gait: ~20 ft. with RW and CGA  · Chair follow throughout; returned via chair  · Decreased sangeeta, decreased step length, decreased weight-shifting, decreased toe-floor clearance  · V/c for upright posture and forward gaze  · Reported feeling fatigued and weak, requesting seated rest. RN then to bedside to report pt tachy with HR in 150s and advised no further ambulation.       AM-PAC 6 CLICK MOBILITY  Turning over in bed (including adjusting bedclothes, sheets and blankets)?: 3  Sitting down on and standing up from a chair with arms (e.g., wheelchair, bedside commode, etc.): 2  Moving from lying on back to sitting on the side of the bed?: 3  Moving to and from a bed to a chair (including a wheelchair)?: 3  Need to walk in hospital room?: 2  Climbing 3-5 steps with a railing?: 1  Total Score: 14       Therapeutic Activities and Exercises:  Performed sit<>stand with minAx2 and RW. Pt reported feeling dizziness when initially coming to stand. Cued on pursed lip breathing and maintaining eye opening. Pt reported worsening dizziness and required return to sit. Dizziness improved with seated rest. Performed sit<>stand x2nd trial with minAx2 and RW. Pt denied dizziness when in standing thus mobility progressed. Performed ambulation in room as described above. Pt reported weakness and fatigue and requested seated rest. RN to bedside to report pt tachy with HR in 150s. Thus, pt returned via chair. Performed seated therex BLE x10 reps each: AP, LAQ,  hip flexion. Pt instructed to continue performing therex (I) 3x daily. Pt v/u.   Pt oriented to call bell and instructed to call RN when ready to return to bed. Pt v/u. RN notified of level of assist needed for transfers.   White board updated.    Patient left up in chair with all lines intact, call button in reach, RN notified and pt's wife present.    GOALS:    Physical Therapy Goals        Problem: Physical Therapy Goal    Goal Priority Disciplines Outcome Goal Variances Interventions   Physical Therapy Goal     PT/OT, PT Ongoing (interventions implemented as appropriate)     Description:  Goals to be met by: 18     Patient will increase functional independence with mobility by performin. Supine to sit with Stand-by Assistance - met   2. Sit to stand transfer with Minimal Assistance  3. Bed to chair transfer with Minimal Assistance using appropriate AD or without AD. - met  4. Gait  x 3 feet with Minimal Assistance using appropriate AD or without AD. - met  4a. Gait x50 ft with SBA using appropriate AD or without AD  5. Wheelchair propulsion x20 feet with Stand-by Assistance using BUE and/or BLE.  6. Lower extremity exercise program x15 reps, with assistance as needed, in order to increase LE strength and (I) with functional mobility.                        Time Tracking:     PT Received On: 01/15/18  PT Start Time: 1357     PT Stop Time: 1415  PT Total Time (min): 18 min     Billable Minutes: Therapeutic Exercise 18    Treatment Type: Treatment  PT/PTA: PT     PTA Visit Number: 0     Lesly Tinoco, PT, DPT   1/15/2018

## 2018-01-15 NOTE — PROCEDURES
Radiology Post-Procedure Note    Pre Op Diagnosis: port dysfunction  Post Op Diagnosis: Same    Procedure: port check    Procedure performed by: Mejia Rossi MD    Findings:   Pt presented from the floor with L chest port accessed. Using fluoroscopy,  images demonstrated catheter tip at the junction of the L innominate vein and SVC. The port did not draw back blood, but was able to be flushed without difficulty. Contrast was injected images demonstrated a fibrin sheath at the catheter tip.     Patient tolerated procedure well.    Mejia Rossi MD  Radiology PGY-2  [phone]

## 2018-01-15 NOTE — ASSESSMENT & PLAN NOTE
Unclear etiology of tachycardia but leading ddx with fever 1/14/18 is UTI. Qtc wnl.  Sinus tachycardia 1/13/18.  Denies chest pain or SOB but feels weak.  Denies dizziness at this time. Slight improvement from 140s to 130s after IVF.  May be dehydration. Otherwise not compromising BP at this time.  -Ddx includes drug related  --prednisone 75mg qday held  --other drugs : galantamine (causes bradycardia, heart block; ?possible compensatory response).  Memantine (hyper- or hypotension).  Olanzapine (conduction abnormalities), venlafaxine (tachycardia, hypotension)  ---consider consult neuro for de-escalation  -Ruled out ACS with trop x 3 negative.   -slight improvement with hydration and review of vitals show lowest HR when sleeping  -continue with monitoring

## 2018-01-15 NOTE — ASSESSMENT & PLAN NOTE
-pt complaining of worsening orthostatic symptoms, decreased PO intake, and weakness over the past week  -maintenance fluids 100 cc/hr x12 hrs  -boost  -PT/OT consulted

## 2018-01-15 NOTE — PROGRESS NOTES
Admit Assessment    Patient Identification  Lane Nunez Jr.   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   is involved was referred to the Social Work Department via (Referal).  Patient presents as a 67 y.o. year old  male.    Persons interviewed with patient's permission: spouse Maryjane  Living Situation:      Resides at [address] [place] [address] [id number] [place] [id number], phone: [phone] (home).      Pt resides with Maryjane. They have 3 steps to enter the home. At baseline pt needs standby assist from the bed to the bathroom. Maryjane stated that it has been increasingly difficult for her to provide enough support.     Current or Past Agencies and Description of Services/Supplies    DME  Equipment Currently Used at Home: walker, rolling, rollator, wheelchair, bedside commode, shower chair, cane, quad    Home Health  Agency Name: [facility]  Agency Phone Number: [phone]  Services: nursing, PT and speech therapy    IV Infusion  N/A    Nutrition: oral    Outpatient Pharmacy:     [id number] - [place] - [address] AT [facility]  [address]  [place] [address]  Phone: [phone] Fax: [phone]    [facility] - [address]  [address] [place] [address]  Phone: [phone] Fax: [phone]    [id number] - [place] - [address] AT COR[facility] OF [facility]  [address]  [place] [address]  Phone: [phone] Fax: [phone]      Patient Preference of agencies include [facility]    Patient/Caregiver informed of right to choose providers or agencies.  Patient provides permission to release any necessary information to [facility] and  to Non-[facility] agencies as needed to facilitate patient care, treatment planning, and patient discharge planning.  Written and verbal resources provided.      Coping  spouse answered most of the questions, pt was sleepy during assessment    Adjustment to Diagnosis and Treatment  appropriate    Emotional/Behavioral/Cognitive Issues  None noted    History/Current Symptoms of Anxiety/Depression: Yes  History/Current Substance Use:   Social History     Social History Main Topics    Smoking status: Former Smoker     Packs/day: 1.00     Years: 40.00     Quit date: 11/15/2008    Smokeless tobacco: Never Used    Alcohol use No      Comment: rare    Drug use: No    Sexual activity: Yes     Partners: Female       Indications of Abuse/Neglect: No  Abuse Screen  Do You Feel Unsafe at Home, Work or School?: no    Financial:  Payor/Plan Subscr  Sex Relation Sub. Ins. ID Effective Group Num   1. [place]* HUYNHNAT JR. 1950 Male  [id number]                                    [address]   2. [facility]* NAT HUYNH JR. 1950 Male  [id number]                                    [address]        Other identified concerns/needs: adequate caregiver assist at home    Plan: TBD    Interventions/Referrals: [facility] referral    Patient/caregiver engaged in treatment planning process.     providing psychosocial and supportive counseling, resources, education, assistance and discharge planning as appropriate.  Patient/caregiver state understanding of  available resources,  following, remains available.

## 2018-01-15 NOTE — ASSESSMENT & PLAN NOTE
BPH and was straight cath'd 500cc 1/14/18.  Wife also reports constipation.  Urinary retention either not well controlled with home finasteride or exacerbated by constipation.  -continue home finasteride     --consider addition of tamsulosin if requiring persistent straight caths

## 2018-01-15 NOTE — ASSESSMENT & PLAN NOTE
-hypotensive on admission initially held home coreg and lisinopril  -BP adequately controlled without medications at this time.

## 2018-01-15 NOTE — PLAN OF CARE
Problem: Physical Therapy Goal  Goal: Physical Therapy Goal  Goals to be met by: 18     Patient will increase functional independence with mobility by performin. Supine to sit with Stand-by Assistance - met   2. Sit to stand transfer with Minimal Assistance  3. Bed to chair transfer with Minimal Assistance using appropriate AD or without AD. - met  4. Gait  x 3 feet with Minimal Assistance using appropriate AD or without AD. - met  4a. Gait x50 ft with SBA using appropriate AD or without AD  5. Wheelchair propulsion x20 feet with Stand-by Assistance using BUE and/or BLE.  6. Lower extremity exercise program x15 reps, with assistance as needed, in order to increase LE strength and (I) with functional mobility.      Outcome: Ongoing (interventions implemented as appropriate)  Goals reviewed and remain appropriate. Pt progressing towards goals.    Lesly Tinoco, PT, DPT   1/15/2018  [phone]

## 2018-01-15 NOTE — ASSESSMENT & PLAN NOTE
Wife reports that he hasn't had a bowel movement since admission.  May contribute to urinary retention.    -start senna/colace and miralax daily

## 2018-01-15 NOTE — PROGRESS NOTES
01/14/18 1953   Vital Signs   Temp (!) 101.1 °F (38.4 °C)   Temp src Oral   Pulse (!) 138   Resp 18   SpO2 95 %   /70   MAP (mmHg) 91   Assessments (Pre/Post)   Level of Consciousness (AVPU) alert     Dr. Arrieta notified of increased temp and HR. Last blood cx on 1/12. Dr. Arrieta ordered lactic, another set of blood cx, a urinary analysis, and CXR. Stated to recheck in 1 hour to see if temp sustained before starting antibiotics

## 2018-01-15 NOTE — PROGRESS NOTES
[facility]  Hematology  Bone Marrow Transplant  Progress Note    Patient Name: Lane Nunez Jr.  Admission Date: 1/12/2018  Hospital Length of Stay: 3 days  Code Status: DNR    Subjective:     Interval History: Febrile overnight.  Wife also reports constipation.  Straight cath'd for 500cc 1/14/18.  Dysuria since getting straight cath'd.    Objective:     Vital Signs (Most Recent):  Temp: 99.2 °F (37.3 °C) (01/15/18 1555)  Pulse: (!) 141 (01/15/18 1555)  Resp: 18 (01/15/18 1555)  BP: 137/80 (01/15/18 1555)  SpO2: 96 % (01/15/18 1555) Vital Signs (24h Range):  Temp:  [97.6 °F (36.4 °C)-102.1 °F (38.9 °C)] 99.2 °F (37.3 °C)  Pulse:  [118-145] 141  Resp:  [18-20] 18  SpO2:  [94 %-97 %] 96 %  BP: (101-137)/(61-81) 137/80     Weight: 99.5 kg (219 lb 5.7 oz)  Body mass index is 28.94 kg/m².  Body surface area is 2.26 meters squared.    ECOG SCORE         [unfilled]    Intake/Output - Last 3 Shifts       01/13 0700 - 01/14 0659 01/14 0700 - 01/15 0659 01/15 0700 - 01/16 0659    P.O. 767 1060     I.V. (mL/kg) 1123.8 (11.3) 742.5 (7.5) 270 (2.7)    IV Piggyback  800 50    Total Intake(mL/kg) 1890.8 (19) 2602.5 (26.2) 320 (3.2)    Urine (mL/kg/hr) 1525 (0.6) 1525 (0.6) 800 (0.8)    Total Output 1525 1525 800    Net +365.8 +1077.5 -480           Urine Occurrence 1 x            Physical Exam   Constitutional: He appears well-developed.   HENT:   Head: Normocephalic.   Eyes: EOM are normal. Pupils are equal, round, and reactive to light.   Neck: Normal range of motion. No tracheal deviation present.   Cardiovascular: Regular rhythm and normal heart sounds.  Exam reveals no gallop and no friction rub.    No murmur heard.  tachycardic   Pulmonary/Chest: Effort normal and breath sounds normal. No respiratory distress. He has no wheezes. He has no rales.   Abdominal: Soft. Bowel sounds are normal. He exhibits no distension and no mass. There is no tenderness. There is no guarding.   Musculoskeletal: He exhibits no  edema.   Neurological: He is alert.   Slight left sided droop (baseline per wife) and right pupil slightly larger than left.  No other focal deficits.   Skin: Skin is warm and dry.   Multiple areas of erythematous papular and vesicular lesions throughout body       Significant Labs:   CBC:   Recent Labs  Lab 01/14/18  0547 01/15/18  0440   WBC 7.33 6.56   HGB 8.1* 7.8*   HCT 25.7* 24.7*   PLT 26* 26*   , CMP:   Recent Labs  Lab 01/14/18  0547 01/15/18  0440    137   K 4.0 4.2    104   CO2 27 22*   GLU 79 109   BUN 15 14   CREATININE 0.8 0.9   CALCIUM 9.0 8.9   PROT 6.1 6.0   ALBUMIN 1.9* 1.8*   BILITOT 0.6 0.5   ALKPHOS 178* 158*   AST 15 21   ALT 17 17   ANIONGAP 8 11   EGFRNONAA >60.0 >60.0    and Coagulation:   Recent Labs  Lab 01/14/18  0547 01/15/18  0440   INR 1.0 1.0       Diagnostic Results:  None    Assessment/Plan:     * 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 BM Bx negative. Initially planned for treatment with brentuximab.  Stage II ALCL (chest lymphadenopathy only)  -skin bx on 12/28/17- SKIN, RIGHT UPPER BACK, EXCISIONAL BIOPSY: CD30-positive, ALK negative T-cell lymphoproliferative disorder  -Port study today.  From CXR, appears to be in brachiocephalic veins.  Will not need central access for brentuximab.  -Holding prednisone for concern it may have caused strain on the heart.  Held off on brentuximab due to tachycardia 1/13/18 and 1/14/18.  Reviewed old progress notes and he had been tachycardic on previous admission in 110s.  -Viral workup HIV and hepatitis negative. EBV and CMV pending   -daily TLS labs  --LDH downtrend 1895 to 1353, uric acid downtrend 5.9 to 5.3  -Recommendation to get outpatient brentuximab        Tachycardia    Unclear etiology of tachycardia but leading ddx with fever 1/14/18 is UTI. Qtc wnl.  Sinus tachycardia 1/13/18.  Denies chest pain or SOB but feels weak.  Denies dizziness at this time. Slight  improvement from 140s to 130s after IVF.  May be dehydration. Otherwise not compromising BP at this time.  -Ddx includes drug related  --prednisone 75mg qday held  --other drugs : galantamine (causes bradycardia, heart block; ?possible compensatory response).  Memantine (hyper- or hypotension).  Olanzapine (conduction abnormalities), venlafaxine (tachycardia, hypotension)  ---consider consult neuro for de-escalation  -Ruled out ACS with trop x 3 negative.   -slight improvement with hydration and review of vitals show lowest HR when sleeping  -continue with monitoring        Thrombocytopenia    Concern for ITP on previous admission for which he was empirically started on prednisone 100mg qday without response.  Thrombocytopenia had been slowly worsening over the past year and particularly has been lower since 11/2017.  -Stopped prednisone 75mg qday due to tachycardia and potential heart strain with known history of systolic dysfunction.  -monitoring platelets daily; has not required plt transfusion nor has he bled thus far.        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  HIT panels from 6/2014 show two OD readings of 1.034 and 1.212.          Constipation    Wife reports that he hasn't had a bowel movement since admission.  May contribute to urinary retention.    -start senna/colace and miralax daily        Fever    Febrile 1/14/18.  With urinary retention, suspect UTI as leading source.  Differentials include oral with dental caries seen on physical exam.  He denies URI symptoms and cxr without infiltrate.  -follow up blood cx x2  -continue with abx.  Day 2 of cefepime and vancomycin  --consider de-escalation of vancomycin tomorrow          Glaucoma    -continue home eye drops         Neuropathy    -continue home gabapentin  - PT/OT, will likely need home health        Dysphagia    - possibly secondary to dementia  - He has refused a G-tube on multiple occasions in the past per chart  - During last  admission, patient had regular diet with nectar thick liquids and aspiration precautions          Multiple myeloma in remission    See oncologic hx on H&P for more information  - had silva auto 6/25/14   - was in CR at 1 year  - maintenance Revlimid on hold for 4 weeks due to thrombocytopenia. Will continue to hold at this time  - now with BLE weakness, inability to walk--Will likely need PT/OT after therapy  - recent SPEP with normal protein, FLC from 12/28 with kappa light chain of 4.3, ratio of 1.40   - bone marrow biopsy 12/28/17 : NO MORPHOLOGIC OR IMMUNOPHENOTYPIC EVIDENCE OF RESIDUAL PLASMA CELL NEOPLASM.  -- NO MORPHOLOGIC OR IMMUNOPHENOTYPIC EVIDENCE OF MARROW INVOLVEMENT BY  CD30-POSITIVE T-CELL LYMPHOPROLIFERATIVE DISORDER        Cytopenia    Anemia and thrombocytopenia likely 2/2 disease  - Revlimid has been on hold due to thrombocytopenia  - Transfuse for hgb <7 g/dL, plt <10 k/uL         Cardiomyopathy    -hypotensive on admission, holding lasix 20 mg daily  - hx of decreased EF 30-40%    --may consider repeat echo given persistent tachycardia        Hypertension    -hypotensive on admission initially held home coreg and lisinopril  -BP adequately controlled without medications at this time.        History of peripheral stem cell transplant    -had silva auto SCT 6/25/14 for myeloma, Day 1301  - 1 year restaging marrow on 7/24/15 showed 40% cellularity with trilineage hematopoiesis and no morphologic evidence of plasma cells.  Cytogenetics could not be done as the metaphase cells did not grow. SPEP/ELENO 12/28/17 without monoclonal peaks. FLC from 12/28 with kappa light chain of 4.3, ratio of 1.40   - PET CT did not reveal any evidence of myeloma at 1 year   - skeletal survey 12/28/17 was negative  - maintenance Revlimid on hold         Debility    -pt complaining of worsening orthostatic symptoms, decreased PO intake, and weakness over the past week  -maintenance fluids 100 cc/hr x12 hrs  -boost  -PT/OT  consulted        BPH (benign prostatic hyperplasia)    BPH and was straight cath'd 500cc 1/14/18.  Wife also reports constipation.  Urinary retention either not well controlled with home finasteride or exacerbated by constipation.  -continue home finasteride     --consider addition of tamsulosin if requiring persistent straight caths        Memory impairment    - pt with dementia  - continue home memantine and galantamine         Depression    -Continue home amitriptyline, olanzapine, and venlafaxine            VTE Risk Mitigation         Ordered     Medium Risk of VTE  Once      01/12/18 1740     Place sequential compression device  Until discontinued      01/12/18 1740          Disposition:   Plan for potential discharge home with home health services for outpatient brentuximab.  Wife declined PT/OT recommendation for SNF    Russel Martin MD  Bone Marrow Transplant  [facility]

## 2018-01-15 NOTE — PROGRESS NOTES
01/14/18 2355   Vital Signs   Temp (!) 102.1 °F (38.9 °C)   Temp src Oral   Pulse (!) 145   Heart Rate Source Monitor   Resp 20   SpO2 95 %   O2 Device (Oxygen Therapy) room air   /81   MAP (mmHg) 96   BP Location Left arm   Patient Position Lying    Dr. Arrieta notified of increased temp (previous temp was not sustained) and he ordered vanc, cefepime and 500cc bolus for a lactic of 3.1. Also bladder scanned patient b/c patient having hard time urinating and also complaining of burning- 430cc found, Dr. Arrieta stated to straight cath patient. Will continue to monitor.

## 2018-01-15 NOTE — PLAN OF CARE
Problem: Patient Care Overview  Goal: Plan of Care Review  Outcome: Ongoing (interventions implemented as appropriate)  Patient remained free from falls throughout shift, call bell within reach. Patient now on vanc and cefepime for TMAX 102.1. See previous notes. HR currently 113-119 at rest. Bladder scanned and straight catheterized patient once due to dysuria and retention. Chemo held off yesterday because of increased heart rate. Cardiac monitoring initiated last night. Vitals stable, will continue to monitor.

## 2018-01-15 NOTE — ASSESSMENT & PLAN NOTE
Concern for ITP on previous admission for which he was empirically started on prednisone 100mg qday without response.  Thrombocytopenia had been slowly worsening over the past year and particularly has been lower since 11/2017.  -Stopped prednisone 75mg qday due to tachycardia and potential heart strain with known history of systolic dysfunction.  -monitoring platelets daily; has not required plt transfusion.  Epistaxis 1/16/18 self-limited.  Will hold on transfusion.

## 2018-01-15 NOTE — ASSESSMENT & PLAN NOTE
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 BM Bx negative. Initially planned for treatment with brentuximab.  Stage II ALCL (chest lymphadenopathy only)  -skin bx on 12/28/17- SKIN, RIGHT UPPER BACK, EXCISIONAL BIOPSY: CD30-positive, ALK negative T-cell lymphoproliferative disorder  -Port study today.  From CXR, appears to be in brachiocephalic veins.  Will not need central access for brentuximab.  -Holding prednisone for concern it may have caused strain on the heart.  Held off on brentuximab due to tachycardia 1/13/18 and 1/14/18.  Reviewed old progress notes and he had been tachycardic on previous admission in 110s.  -Viral workup HIV and hepatitis negative. EBV and CMV pending   -daily TLS labs  --LDH downtrend 1895 to 1353, uric acid downtrend 5.9 to 5.3  -Recommendation to get outpatient brentuximab

## 2018-01-16 LAB
ALBUMIN SERPL BCP-MCNC: 1.8 G/DL
ALP SERPL-CCNC: 150 U/L
ALT SERPL W/O P-5'-P-CCNC: 14 U/L
ANION GAP SERPL CALC-SCNC: 8 MMOL/L
ANISOCYTOSIS BLD QL SMEAR: SLIGHT
AST SERPL-CCNC: 14 U/L
BACTERIA UR CULT: NO GROWTH
BASOPHILS # BLD AUTO: 0.01 K/UL
BASOPHILS NFR BLD: 0.2 %
BILIRUB SERPL-MCNC: 0.4 MG/DL
BUN SERPL-MCNC: 12 MG/DL
CALCIUM SERPL-MCNC: 8.8 MG/DL
CHLORIDE SERPL-SCNC: 100 MMOL/L
CO2 SERPL-SCNC: 28 MMOL/L
CREAT SERPL-MCNC: 0.8 MG/DL
DIFFERENTIAL METHOD: ABNORMAL
ENTEROVIRUS: NOT DETECTED
EOSINOPHIL # BLD AUTO: 0 K/UL
EOSINOPHIL NFR BLD: 0.5 %
ERYTHROCYTE [DISTWIDTH] IN BLOOD BY AUTOMATED COUNT: 17.9 %
EST. GFR  (AFRICAN AMERICAN): >60 ML/MIN/1.73 M^2
EST. GFR  (NON AFRICAN AMERICAN): >60 ML/MIN/1.73 M^2
GLUCOSE SERPL-MCNC: 118 MG/DL
HCT VFR BLD AUTO: 24 %
HGB BLD-MCNC: 7.8 G/DL
HUMAN BOCAVIRUS: NOT DETECTED
HUMAN CORONAVIRUS, COMMON COLD VIRUS: NOT DETECTED
IMM GRANULOCYTES # BLD AUTO: 0.02 K/UL
IMM GRANULOCYTES NFR BLD AUTO: 0.3 %
INFLUENZA A - H1N1-09: NOT DETECTED
LDH SERPL L TO P-CCNC: 1235 U/L
LYMPHOCYTES # BLD AUTO: 1.4 K/UL
LYMPHOCYTES NFR BLD: 21.7 %
MAGNESIUM SERPL-MCNC: 1.7 MG/DL
MCH RBC QN AUTO: 31.3 PG
MCHC RBC AUTO-ENTMCNC: 32.5 G/DL
MCV RBC AUTO: 96 FL
MONOCYTES # BLD AUTO: 0.8 K/UL
MONOCYTES NFR BLD: 12.4 %
NEUTROPHILS # BLD AUTO: 4 K/UL
NEUTROPHILS NFR BLD: 64.9 %
NRBC BLD-RTO: 0 /100 WBC
OVALOCYTES BLD QL SMEAR: ABNORMAL
PARAINFLUENZA: NOT DETECTED
PHOSPHATE SERPL-MCNC: 3.2 MG/DL
PLATELET # BLD AUTO: 26 K/UL
PLATELET BLD QL SMEAR: ABNORMAL
PMV BLD AUTO: 12.7 FL
POCT GLUCOSE: 145 MG/DL (ref 70–110)
POIKILOCYTOSIS BLD QL SMEAR: SLIGHT
POLYCHROMASIA BLD QL SMEAR: ABNORMAL
POTASSIUM SERPL-SCNC: 4.2 MMOL/L
PROT SERPL-MCNC: 6 G/DL
RBC # BLD AUTO: 2.49 M/UL
RVP - ADENOVIRUS: NOT DETECTED
RVP - HUMAN METAPNEUMOVIRUS (HMPV): NOT DETECTED
RVP - INFLUENZA A: NOT DETECTED
RVP - INFLUENZA B: NOT DETECTED
RVP - RESPIRATORY SYNCTIAL VIRUS (RSV) A: NOT DETECTED
RVP - RESPIRATORY VIRAL PANEL, SOURCE: NORMAL
RVP - RHINOVIRUS: NOT DETECTED
SODIUM SERPL-SCNC: 136 MMOL/L
URATE SERPL-MCNC: 4.8 MG/DL
WBC # BLD AUTO: 6.22 K/UL

## 2018-01-16 PROCEDURE — 99233 SBSQ HOSP IP/OBS HIGH 50: CPT | Mod: ,,, | Performed by: INTERNAL MEDICINE

## 2018-01-16 PROCEDURE — 25000003 PHARM REV CODE 250: Performed by: STUDENT IN AN ORGANIZED HEALTH CARE EDUCATION/TRAINING PROGRAM

## 2018-01-16 PROCEDURE — 25000003 PHARM REV CODE 250: Performed by: INTERNAL MEDICINE

## 2018-01-16 PROCEDURE — 83615 LACTATE (LD) (LDH) ENZYME: CPT

## 2018-01-16 PROCEDURE — 20600001 HC STEP DOWN PRIVATE ROOM

## 2018-01-16 PROCEDURE — 87040 BLOOD CULTURE FOR BACTERIA: CPT

## 2018-01-16 PROCEDURE — 83735 ASSAY OF MAGNESIUM: CPT

## 2018-01-16 PROCEDURE — 87086 URINE CULTURE/COLONY COUNT: CPT

## 2018-01-16 PROCEDURE — 84100 ASSAY OF PHOSPHORUS: CPT

## 2018-01-16 PROCEDURE — 63600175 PHARM REV CODE 636 W HCPCS: Performed by: STUDENT IN AN ORGANIZED HEALTH CARE EDUCATION/TRAINING PROGRAM

## 2018-01-16 PROCEDURE — 36415 COLL VENOUS BLD VENIPUNCTURE: CPT

## 2018-01-16 PROCEDURE — 93005 ELECTROCARDIOGRAM TRACING: CPT

## 2018-01-16 PROCEDURE — 85025 COMPLETE CBC W/AUTO DIFF WBC: CPT

## 2018-01-16 PROCEDURE — 93010 ELECTROCARDIOGRAM REPORT: CPT | Mod: ,,, | Performed by: INTERNAL MEDICINE

## 2018-01-16 PROCEDURE — 84550 ASSAY OF BLOOD/URIC ACID: CPT

## 2018-01-16 PROCEDURE — 80053 COMPREHEN METABOLIC PANEL: CPT

## 2018-01-16 RX ORDER — METOPROLOL TARTRATE 1 MG/ML
INJECTION, SOLUTION INTRAVENOUS
Status: DISPENSED
Start: 2018-01-16 | End: 2018-01-17

## 2018-01-16 RX ORDER — VANCOMYCIN HCL IN 5 % DEXTROSE 1.5G/250ML
15 PLASTIC BAG, INJECTION (ML) INTRAVENOUS
Status: DISCONTINUED | OUTPATIENT
Start: 2018-01-16 | End: 2018-01-18

## 2018-01-16 RX ORDER — METOPROLOL TARTRATE 1 MG/ML
5 INJECTION, SOLUTION INTRAVENOUS ONCE
Status: COMPLETED | OUTPATIENT
Start: 2018-01-16 | End: 2018-01-16

## 2018-01-16 RX ORDER — POLYETHYLENE GLYCOL 3350 17 G/17G
17 POWDER, FOR SOLUTION ORAL 3 TIMES DAILY PRN
Status: DISCONTINUED | OUTPATIENT
Start: 2018-01-16 | End: 2018-01-23 | Stop reason: HOSPADM

## 2018-01-16 RX ORDER — SODIUM CHLORIDE 9 MG/ML
INJECTION, SOLUTION INTRAVENOUS CONTINUOUS
Status: ACTIVE | OUTPATIENT
Start: 2018-01-16 | End: 2018-01-16

## 2018-01-16 RX ORDER — SODIUM CHLORIDE 9 MG/ML
INJECTION, SOLUTION INTRAVENOUS CONTINUOUS
Status: ACTIVE | OUTPATIENT
Start: 2018-01-16 | End: 2018-01-17

## 2018-01-16 RX ADMIN — GABAPENTIN 300 MG: 300 CAPSULE ORAL at 09:01

## 2018-01-16 RX ADMIN — SODIUM CHLORIDE: 0.9 INJECTION, SOLUTION INTRAVENOUS at 09:01

## 2018-01-16 RX ADMIN — Medication 1500 MG: at 10:01

## 2018-01-16 RX ADMIN — OSELTAMIVIR PHOSPHATE 75 MG: 75 CAPSULE ORAL at 08:01

## 2018-01-16 RX ADMIN — MORPHINE SULFATE 15 MG: 15 TABLET ORAL at 06:01

## 2018-01-16 RX ADMIN — ASPIRIN 81 MG: 81 TABLET, COATED ORAL at 06:01

## 2018-01-16 RX ADMIN — FAMOTIDINE 20 MG: 20 TABLET, FILM COATED ORAL at 08:01

## 2018-01-16 RX ADMIN — GABAPENTIN 300 MG: 300 CAPSULE ORAL at 06:01

## 2018-01-16 RX ADMIN — GABAPENTIN 300 MG: 300 CAPSULE ORAL at 01:01

## 2018-01-16 RX ADMIN — ACETAMINOPHEN 650 MG: 325 TABLET ORAL at 06:01

## 2018-01-16 RX ADMIN — POLYETHYLENE GLYCOL 3350 17 G: 17 POWDER, FOR SOLUTION ORAL at 08:01

## 2018-01-16 RX ADMIN — OSELTAMIVIR PHOSPHATE 75 MG: 75 CAPSULE ORAL at 09:01

## 2018-01-16 RX ADMIN — CEFEPIME HYDROCHLORIDE 1 G: 1 INJECTION, SOLUTION INTRAVENOUS at 09:01

## 2018-01-16 RX ADMIN — Medication 1500 MG: at 09:01

## 2018-01-16 RX ADMIN — OLANZAPINE 7.5 MG: 2.5 TABLET, FILM COATED ORAL at 09:01

## 2018-01-16 RX ADMIN — CEFEPIME HYDROCHLORIDE 1 G: 1 INJECTION, SOLUTION INTRAVENOUS at 05:01

## 2018-01-16 RX ADMIN — FLUTICASONE FUROATE AND VILANTEROL TRIFENATATE 1 PUFF: 100; 25 POWDER RESPIRATORY (INHALATION) at 08:01

## 2018-01-16 RX ADMIN — VENLAFAXINE HYDROCHLORIDE 150 MG: 37.5 CAPSULE, EXTENDED RELEASE ORAL at 08:01

## 2018-01-16 RX ADMIN — SODIUM CHLORIDE: 0.9 INJECTION, SOLUTION INTRAVENOUS at 07:01

## 2018-01-16 RX ADMIN — GALANTAMINE HYDROBROMIDE 16 MG: 4 TABLET, FILM COATED ORAL at 08:01

## 2018-01-16 RX ADMIN — CEFEPIME HYDROCHLORIDE 1 G: 1 INJECTION, SOLUTION INTRAVENOUS at 02:01

## 2018-01-16 RX ADMIN — MEMANTINE 10 MG: 10 TABLET ORAL at 08:01

## 2018-01-16 RX ADMIN — STANDARDIZED SENNA CONCENTRATE AND DOCUSATE SODIUM 1 TABLET: 8.6; 5 TABLET, FILM COATED ORAL at 08:01

## 2018-01-16 RX ADMIN — GALANTAMINE HYDROBROMIDE 16 MG: 4 TABLET, FILM COATED ORAL at 06:01

## 2018-01-16 RX ADMIN — FINASTERIDE 5 MG: 5 TABLET, FILM COATED ORAL at 01:01

## 2018-01-16 RX ADMIN — SODIUM CHLORIDE: 0.9 INJECTION, SOLUTION INTRAVENOUS at 01:01

## 2018-01-16 RX ADMIN — METOROPROLOL TARTRATE 5 MG: 5 INJECTION, SOLUTION INTRAVENOUS at 05:01

## 2018-01-16 RX ADMIN — SODIUM CHLORIDE 500 ML: 900 INJECTION, SOLUTION INTRAVENOUS at 09:01

## 2018-01-16 RX ADMIN — FAMOTIDINE 20 MG: 20 TABLET, FILM COATED ORAL at 09:01

## 2018-01-16 RX ADMIN — MEMANTINE 10 MG: 10 TABLET ORAL at 09:01

## 2018-01-16 NOTE — SUBJECTIVE & OBJECTIVE
Subjective:     Interval History: Febrile overnight and diaphoretic this morning.  Denied nausea, vomiting, chest pain, or shortness of breath.  No bowel movement yesterday.  Did not require any straight cath's either.  Also with epistaxis this morning with blood from his oropharynx.    Objective:     Vital Signs (Most Recent):  Temp: (!) 100.4 °F (38 °C) (01/16/18 0856)  Pulse: (!) 134 (01/16/18 1031)  Resp: (!) 24 (01/16/18 0856)  BP: 107/62 (01/16/18 0856)  SpO2: (!) 94 % (01/16/18 0856) Vital Signs (24h Range):  Temp:  [98.7 °F (37.1 °C)-100.8 °F (38.2 °C)] 100.4 °F (38 °C)  Pulse:  [131-141] 134  Resp:  [16-24] 24  SpO2:  [93 %-98 %] 94 %  BP: (107-137)/(62-80) 107/62     Weight: 99.5 kg (219 lb 5.7 oz)  Body mass index is 28.94 kg/m².  Body surface area is 2.26 meters squared.    ECOG SCORE         [unfilled]    Intake/Output - Last 3 Shifts       01/14 0700 - 01/15 0659 01/15 0700 - 01/16 0659 01/16 0700 - 01/17 0659    P.O. 1060 240 480    I.V. (mL/kg) 742.5 (7.5) 1231.7 (12.4) 0 (0)    IV Piggyback 800 650     Total Intake(mL/kg) 2602.5 (26.2) 2121.7 (21.3) 480 (4.8)    Urine (mL/kg/hr) 1525 (0.6) 2575 (1.1) 900 (1.9)    Total Output 1525 2575 900    Net +1077.5 -453.3 -420                 Physical Exam   Constitutional: He appears well-developed.   HENT:   Head: Normocephalic.   Eyes: EOM are normal. Pupils are equal, round, and reactive to light.   Neck: Normal range of motion. No tracheal deviation present.   Cardiovascular: Regular rhythm and normal heart sounds.  Exam reveals no gallop and no friction rub.    No murmur heard.  tachycardic   Pulmonary/Chest: Effort normal and breath sounds normal. No respiratory distress. He has no wheezes. He has no rales.   Abdominal: Soft. Bowel sounds are normal. He exhibits no distension and no mass. There is no tenderness. There is no guarding.   Musculoskeletal: He exhibits no edema.   Neurological: He is alert.   Slight left sided droop (baseline per wife) and  right pupil slightly larger than left.  No other focal deficits.   Skin: Skin is warm and dry.   Multiple areas of erythematous papular and vesicular lesions throughout body  Open lesion on mid back that is not actively draining blood without purulent discharge.  Pillow case with dried blood.       Significant Labs:   CBC:   Recent Labs  Lab 01/15/18  0440 01/16/18  0354   WBC 6.56 6.22   HGB 7.8* 7.8*   HCT 24.7* 24.0*   PLT 26* 26*   , CMP:   Recent Labs  Lab 01/15/18  0440 01/16/18  0354    136   K 4.2 4.2    100   CO2 22* 28    118*   BUN 14 12   CREATININE 0.9 0.8   CALCIUM 8.9 8.8   PROT 6.0 6.0   ALBUMIN 1.8* 1.8*   BILITOT 0.5 0.4   ALKPHOS 158* 150*   AST 21 14   ALT 17 14   ANIONGAP 11 8   EGFRNONAA >60.0 >60.0    and Coagulation:   Recent Labs  Lab 01/15/18  0440   INR 1.0       Diagnostic Results:  I have reviewed all pertinent imaging results/findings within the past 24 hours.

## 2018-01-16 NOTE — ASSESSMENT & PLAN NOTE
Anemia and thrombocytopenia likely 2/2 disease  - Revlimid has been on hold due to thrombocytopenia  - Transfuse for hgb <7 g/dL, plt <10 k/uL   - dc'ed ASA 81mg qday with plt <50k

## 2018-01-16 NOTE — ASSESSMENT & PLAN NOTE
Wife reports that he hasn't had a bowel movement since admission.  May contribute to urinary retention.  Still without bowel movement after starting laxative.  -continue senna/colace and increase miralax to TID PRN.

## 2018-01-16 NOTE — ASSESSMENT & PLAN NOTE
-pt complaining of worsening orthostatic symptoms, decreased PO intake, and weakness over the past week  -maintenance fluids 100 cc/hr x12 hrs  -boost  -PT/OT consulted, recommend SNF.  Consult placed to Brendan.

## 2018-01-16 NOTE — CARE UPDATE
RN Proactive Rounding Note  Time of Visit:     Admit Date: 2018  LOS: 4  Code Status: DNR   Date of Visit: 2018  : 1950  Age: 67 y.o.  Sex: male  Bed: [address] [place] [address]:   MRN: [id number]  Was the patient discharged from an ICU this admission? no   Was the patient discharged from a PACU within last 24 hours? no  Did the patient receive conscious sedation/general anesthesia in last 24 hours? no  Was the patient in the ED within the past 24 hours? no  Was the patient started on NIPPV within the past 24 hours? no  Attending Physician: Idris Bernabe MD  Primary Service: Networked reference to record PCT       ASSESSMENT:     Modified Early Warning Score (MEWS): 4  Abnormal Vital Signs: tachycardia  Clinical Issues: Dysrythmia     INTERVENTIONS/ RECOMMENDATIONS:     Arrived to bedside. Nia primary RN present. Pt HR in 140's sustaining asymptomatic, no improvement with fluids per RN. Veronica CORBETT with primary team contacted. EKG ordered, 5 mg of lopressor administered per primary RN. Pt HR improved to 120's. All other VSS.     Discussed plan of care with RAMAKRISHNA Kramer    PHYSICIAN ESCALATION:     Yes    Orders received and case discussed with Dr. Khalil     Disposition: Remain in oncology bed, continuous telemetry monitoring    FOLLOW-UP/CONTINGENCY:       Call back the Rapid Response Nurse at x [phone]  for additional questions or concerns

## 2018-01-16 NOTE — ASSESSMENT & PLAN NOTE
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 BM Bx negative. Initially planned for treatment with brentuximab.  Stage II ALCL (chest lymphadenopathy only)  -skin bx on 12/28/17- SKIN, RIGHT UPPER BACK, EXCISIONAL BIOPSY: CD30-positive, ALK negative T-cell lymphoproliferative disorder  -Port study today.  From CXR, appears to be in brachiocephalic veins.  Will not need central access for brentuximab.  -Holding prednisone for concern it may have caused strain on the heart.  Held off on brentuximab due to tachycardia since 1/13/18.  Reviewed old progress notes and he had been tachycardic on previous admission in 110s.  -Viral workup HIV and hepatitis negative. EBV and CMV negative.  -daily TLS labs  --LDH downtrend 1353 to 1235, uric acid downtrend 5.3 to 4.8  -Reconsidering treatment with brentuximab while inpatient.  -Has port that is located in brachiocephalic v.  Port study 1/15/18 pending final result, but pt was told that it was occluded.  --If no more fevers in next 24 hours, will order for port to be removed.

## 2018-01-16 NOTE — PLAN OF CARE
Problem: Patient Care Overview  Goal: Plan of Care Review  Outcome: Ongoing (interventions implemented as appropriate)  Patient remained free from falls throughout shift, call bell within reach. Tmax 100.8 unsustained. -140s throughout night. Complained of pain once - prn morphine given. Patient voiding spontaneously overnight.  Vitals stable, will continue to monitor.

## 2018-01-16 NOTE — ASSESSMENT & PLAN NOTE
-hypotensive on admission, holding lasix 20 mg daily.  Currently autodiuresing.  - hx of decreased EF 30-40%    --may consider repeat echo given persistent tachycardia

## 2018-01-16 NOTE — ASSESSMENT & PLAN NOTE
BPH and was straight cath'd 500cc 1/14/18.  Wife also reports constipation.  Urinary retention either not well controlled with home finasteride or exacerbated by constipation.  -continue home finasteride     --consider addition of tamsulosin if requiring persistent straight caths  -bladder scan qshift

## 2018-01-16 NOTE — PROGRESS NOTES
01/15/18 1917   Vital Signs   Temp (!) 100.8 °F (38.2 °C)   Temp src Oral   Pulse (!) 138   Resp 16   SpO2 98 %   /74   Assessments (Pre/Post)   Level of Consciousness (AVPU) alert     Dr. Loera notified of increased temp, stated to inform her if temp goes over 101. Will continue to monitor.

## 2018-01-16 NOTE — PROGRESS NOTES
01/16/18 0856   Vital Signs   Temp (!) 100.4 °F (38 °C)   Temp src Axillary   Pulse (!) 139   Heart Rate Source Monitor   Resp (!) 24   SpO2 (!) 94 %   O2 Device (Oxygen Therapy) room air   /62   BP Location Left arm   BP Method Automatic   Patient Position Lying   Ivonne Curtis NP notified of patient's vital signs, cbg 145, patient being significantly diaphoretic,  skin being cold, and patient's non-productive coughing. Patient's wife states hes been coughing all morning.

## 2018-01-16 NOTE — ASSESSMENT & PLAN NOTE
Febrile 1/14/18.  With urinary retention, suspect UTI as leading source.  Differentials include oral with dental caries seen on physical exam.  He denies URI symptoms and cxr without infiltrate.  -1/12 and 1/14 blood cx ngtd.  Urine cx NGTD.  RVP pending.  -continue with abx.  Day 3 of cefepime and vancomycin

## 2018-01-16 NOTE — PROGRESS NOTES
[facility]  Hematology  Bone Marrow Transplant  Progress Note    Patient Name: Lane Nunez Jr.  Admission Date: 1/12/2018  Hospital Length of Stay: 4 days  Code Status: DNR    Subjective:     Interval History: Febrile overnight and diaphoretic this morning.  Denied nausea, vomiting, chest pain, or shortness of breath.  No bowel movement yesterday.  Did not require any straight cath's either.  Also with epistaxis this morning with blood from his oropharynx.    Objective:     Vital Signs (Most Recent):  Temp: (!) 100.4 °F (38 °C) (01/16/18 0856)  Pulse: (!) 134 (01/16/18 1031)  Resp: (!) 24 (01/16/18 0856)  BP: 107/62 (01/16/18 0856)  SpO2: (!) 94 % (01/16/18 0856) Vital Signs (24h Range):  Temp:  [98.7 °F (37.1 °C)-100.8 °F (38.2 °C)] 100.4 °F (38 °C)  Pulse:  [131-141] 134  Resp:  [16-24] 24  SpO2:  [93 %-98 %] 94 %  BP: (107-137)/(62-80) 107/62     Weight: 99.5 kg (219 lb 5.7 oz)  Body mass index is 28.94 kg/m².  Body surface area is 2.26 meters squared.    ECOG SCORE         [unfilled]    Intake/Output - Last 3 Shifts       01/14 0700 - 01/15 0659 01/15 0700 - 01/16 0659 01/16 0700 - 01/17 0659    P.O. 1060 240 480    I.V. (mL/kg) 742.5 (7.5) 1231.7 (12.4) 0 (0)    IV Piggyback 800 650     Total Intake(mL/kg) 2602.5 (26.2) 2121.7 (21.3) 480 (4.8)    Urine (mL/kg/hr) 1525 (0.6) 2575 (1.1) 900 (1.9)    Total Output 1525 2575 900    Net +1077.5 -453.3 -420                 Physical Exam   Constitutional: He appears well-developed.   HENT:   Head: Normocephalic.   Eyes: EOM are normal. Pupils are equal, round, and reactive to light.   Neck: Normal range of motion. No tracheal deviation present.   Cardiovascular: Regular rhythm and normal heart sounds.  Exam reveals no gallop and no friction rub.    No murmur heard.  tachycardic   Pulmonary/Chest: Effort normal and breath sounds normal. No respiratory distress. He has no wheezes. He has no rales.   Abdominal: Soft. Bowel sounds are normal. He  exhibits no distension and no mass. There is no tenderness. There is no guarding.   Musculoskeletal: He exhibits no edema.   Neurological: He is alert.   Slight left sided droop (baseline per wife) and right pupil slightly larger than left.  No other focal deficits.   Skin: Skin is warm and dry.   Multiple areas of erythematous papular and vesicular lesions throughout body  Open lesion on mid back that is not actively draining blood without purulent discharge.  Pillow case with dried blood.       Significant Labs:   CBC:   Recent Labs  Lab 01/15/18  0440 01/16/18  0354   WBC 6.56 6.22   HGB 7.8* 7.8*   HCT 24.7* 24.0*   PLT 26* 26*   , CMP:   Recent Labs  Lab 01/15/18  0440 01/16/18  0354    136   K 4.2 4.2    100   CO2 22* 28    118*   BUN 14 12   CREATININE 0.9 0.8   CALCIUM 8.9 8.8   PROT 6.0 6.0   ALBUMIN 1.8* 1.8*   BILITOT 0.5 0.4   ALKPHOS 158* 150*   AST 21 14   ALT 17 14   ANIONGAP 11 8   EGFRNONAA >60.0 >60.0    and Coagulation:   Recent Labs  Lab 01/15/18  0440   INR 1.0       Diagnostic Results:  I have reviewed all pertinent imaging results/findings within the past 24 hours.    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 BM Bx negative. Initially planned for treatment with brentuximab.  Stage II ALCL (chest lymphadenopathy only)  -skin bx on 12/28/17- SKIN, RIGHT UPPER BACK, EXCISIONAL BIOPSY: CD30-positive, ALK negative T-cell lymphoproliferative disorder  -Port study today.  From CXR, appears to be in brachiocephalic veins.  Will not need central access for brentuximab.  -Holding prednisone for concern it may have caused strain on the heart.  Held off on brentuximab due to tachycardia since 1/13/18.  Reviewed old progress notes and he had been tachycardic on previous admission in 110s.  -Viral workup HIV and hepatitis negative. EBV and CMV negative.  -daily TLS labs  --LDH downtrend 1353 to 1235,  uric acid downtrend 5.3 to 4.8  -Reconsidering treatment with brentuximab while inpatient.  -Has port that is located in brachiocephalic v.  Port study 1/15/18 pending final result, but pt was told that it was occluded.  --If no more fevers in next 24 hours, will order for port to be removed.        Tachycardia    Unclear etiology of tachycardia but leading ddx with fever 1/14/18 is UTI. Qtc wnl.  Sinus tachycardia since admission.  Denies chest pain or SOB but feels weak.  Denies dizziness at this time. Slight improvement from 140s to 130s after IVF. Otherwise not compromising BP at this time.  -Ddx includes drug related  --prednisone 75mg qday held  --other drugs : galantamine (causes bradycardia, heart block; ?possible compensatory response).  Memantine (hyper- or hypotension).  Olanzapine (conduction abnormalities), venlafaxine (tachycardia, hypotension)  ---consider consult neuro for de-escalation  -Ruled out ACS with trop x 3 negative.   -slight improvement with hydration and review of vitals show lowest HR when sleeping  -continue with monitoring        Thrombocytopenia    Concern for ITP on previous admission for which he was empirically started on prednisone 100mg qday without response.  Thrombocytopenia had been slowly worsening over the past year and particularly has been lower since 11/2017.  -Stopped prednisone 75mg qday due to tachycardia and potential heart strain with known history of systolic dysfunction.  -monitoring platelets daily; has not required plt transfusion.  Epistaxis 1/16/18 self-limited.  Will hold on transfusion.        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  HIT panels from 6/2014 show two OD readings of 1.034 and 1.212.          Constipation    Wife reports that he hasn't had a bowel movement since admission.  May contribute to urinary retention.  Still without bowel movement after starting laxative.  -continue senna/colace and increase miralax to TID PRN.        Fever     Febrile 1/14/18.  With urinary retention, suspect UTI as leading source.  Differentials include oral with dental caries seen on physical exam.  He denies URI symptoms and cxr without infiltrate.  -1/12 and 1/14 blood cx ngtd.  Urine cx NGTD.  RVP pending.  -continue with abx.  Day 3 of cefepime and vancomycin          Glaucoma    -continue home eye drops         Neuropathy    -continue home gabapentin        Dysphagia    - possibly secondary to dementia  - He has refused a G-tube on multiple occasions in the past per chart  - During last admission, patient had regular diet with nectar thick liquids and aspiration precautions          Multiple myeloma in remission    See oncologic hx on H&P for more information  - had silva auto 6/25/14   - was in CR at 1 year  - maintenance Revlimid on hold for 4 weeks due to thrombocytopenia. Will continue to hold at this time  - now with BLE weakness, inability to walk--Will likely need PT/OT after therapy  - recent SPEP with normal protein, FLC from 12/28 with kappa light chain of 4.3, ratio of 1.40   - bone marrow biopsy 12/28/17 : NO MORPHOLOGIC OR IMMUNOPHENOTYPIC EVIDENCE OF RESIDUAL PLASMA CELL NEOPLASM.  -- NO MORPHOLOGIC OR IMMUNOPHENOTYPIC EVIDENCE OF MARROW INVOLVEMENT BY  CD30-POSITIVE T-CELL LYMPHOPROLIFERATIVE DISORDER        Cytopenia    Anemia and thrombocytopenia likely 2/2 disease  - Revlimid has been on hold due to thrombocytopenia  - Transfuse for hgb <7 g/dL, plt <10 k/uL   - dc'ed ASA 81mg qday with plt <50k        Cardiomyopathy    -hypotensive on admission, holding lasix 20 mg daily.  Currently autodiuresing.  - hx of decreased EF 30-40%    --may consider repeat echo given persistent tachycardia        Hypertension    -hypotensive on admission initially held home coreg and lisinopril  -BP adequately controlled without medications at this time.        History of peripheral stem cell transplant    -had silva auto SCT 6/25/14 for myeloma, Day 1302  - 1 year  restaging marrow on 7/24/15 showed 40% cellularity with trilineage hematopoiesis and no morphologic evidence of plasma cells.  Cytogenetics could not be done as the metaphase cells did not grow. SPEP/ELENO 12/28/17 without monoclonal peaks. FLC from 12/28 with kappa light chain of 4.3, ratio of 1.40   - PET CT did not reveal any evidence of myeloma at 1 year   - skeletal survey 12/28/17 was negative  - maintenance Revlimid on hold         Debility    -pt complaining of worsening orthostatic symptoms, decreased PO intake, and weakness over the past week  -maintenance fluids 100 cc/hr x12 hrs  -boost  -PT/OT consulted, recommend SNF.  Consult placed to Brendan.        BPH (benign prostatic hyperplasia)    BPH and was straight cath'd 500cc 1/14/18.  Wife also reports constipation.  Urinary retention either not well controlled with home finasteride or exacerbated by constipation.  -continue home finasteride     --consider addition of tamsulosin if requiring persistent straight caths  -bladder scan qshift        Memory impairment    - pt with dementia  - continue home memantine and galantamine         Depression    -Continue home amitriptyline, olanzapine, and venlafaxine            VTE Risk Mitigation         Ordered     Medium Risk of VTE  Once      01/12/18 1740     Place sequential compression device  Until discontinued      01/12/18 1740          Disposition: inpatient then dispo to SNF    Russel Martin MD  Bone Marrow Transplant  [facility]

## 2018-01-16 NOTE — PLAN OF CARE
Problem: Patient Care Overview  Goal: Plan of Care Review  Outcome: Ongoing (interventions implemented as appropriate)  Fall, pressure ulcer, and infection preventions continued. Cefepime and vancomycin continued. Patient remains tachycardic, increasing tachycardia in the afternoon, MD aware. 500ml NS bolus given in the morning for tachycardia and diaphoretic episode. Wound care consulted. Will continue to monitor.

## 2018-01-16 NOTE — PROGRESS NOTES
01/16/18 1754   Vital Signs   Temp (!) 101.1 °F (38.4 °C)   Temp src Oral   Pulse (!) 130   Heart Rate Source Monitor   Resp 18   SpO2 (!) 93 %   O2 Device (Oxygen Therapy) room air   BP (!) 112/56   BP Location Left arm   BP Method Automatic   Patient Position Lying   Dr. Martin notified of febrile episode.

## 2018-01-16 NOTE — ASSESSMENT & PLAN NOTE
-had silva auto SCT 6/25/14 for myeloma, Day 1302  - 1 year restaging marrow on 7/24/15 showed 40% cellularity with trilineage hematopoiesis and no morphologic evidence of plasma cells.  Cytogenetics could not be done as the metaphase cells did not grow. SPEP/ELENO 12/28/17 without monoclonal peaks. FLC from 12/28 with kappa light chain of 4.3, ratio of 1.40   - PET CT did not reveal any evidence of myeloma at 1 year   - skeletal survey 12/28/17 was negative  - maintenance Revlimid on hold

## 2018-01-16 NOTE — ASSESSMENT & PLAN NOTE
Unclear etiology of tachycardia but leading ddx with fever 1/14/18 is UTI. Qtc wnl.  Sinus tachycardia since admission.  Denies chest pain or SOB but feels weak.  Denies dizziness at this time. Slight improvement from 140s to 130s after IVF. Otherwise not compromising BP at this time.  -Ddx includes drug related  --prednisone 75mg qday held  --other drugs : galantamine (causes bradycardia, heart block; ?possible compensatory response).  Memantine (hyper- or hypotension).  Olanzapine (conduction abnormalities), venlafaxine (tachycardia, hypotension)  ---consider consult neuro for de-escalation  -Ruled out ACS with trop x 3 negative.   -slight improvement with hydration and review of vitals show lowest HR when sleeping  -continue with monitoring

## 2018-01-17 LAB
ALBUMIN SERPL BCP-MCNC: 1.8 G/DL
ALP SERPL-CCNC: 135 U/L
ALT SERPL W/O P-5'-P-CCNC: 14 U/L
ANION GAP SERPL CALC-SCNC: 8 MMOL/L
AST SERPL-CCNC: 21 U/L
BACTERIA BLD CULT: NORMAL
BACTERIA BLD CULT: NORMAL
BASOPHILS # BLD AUTO: 0 K/UL
BASOPHILS NFR BLD: 0 %
BILIRUB SERPL-MCNC: 0.6 MG/DL
BUN SERPL-MCNC: 11 MG/DL
CALCIUM SERPL-MCNC: 8.7 MG/DL
CHLORIDE SERPL-SCNC: 102 MMOL/L
CO2 SERPL-SCNC: 25 MMOL/L
CREAT SERPL-MCNC: 0.8 MG/DL
DIFFERENTIAL METHOD: ABNORMAL
EOSINOPHIL # BLD AUTO: 0 K/UL
EOSINOPHIL NFR BLD: 0.4 %
ERYTHROCYTE [DISTWIDTH] IN BLOOD BY AUTOMATED COUNT: 17.9 %
EST. GFR  (AFRICAN AMERICAN): >60 ML/MIN/1.73 M^2
EST. GFR  (NON AFRICAN AMERICAN): >60 ML/MIN/1.73 M^2
GLUCOSE SERPL-MCNC: 106 MG/DL
HCT VFR BLD AUTO: 23.1 %
HGB BLD-MCNC: 7.5 G/DL
IMM GRANULOCYTES # BLD AUTO: 0.02 K/UL
IMM GRANULOCYTES NFR BLD AUTO: 0.4 %
LYMPHOCYTES # BLD AUTO: 0.9 K/UL
LYMPHOCYTES NFR BLD: 17 %
MAGNESIUM SERPL-MCNC: 2 MG/DL
MCH RBC QN AUTO: 31.5 PG
MCHC RBC AUTO-ENTMCNC: 32.5 G/DL
MCV RBC AUTO: 97 FL
MONOCYTES # BLD AUTO: 0.7 K/UL
MONOCYTES NFR BLD: 12.8 %
NEUTROPHILS # BLD AUTO: 3.7 K/UL
NEUTROPHILS NFR BLD: 69.4 %
NRBC BLD-RTO: 0 /100 WBC
PHOSPHATE SERPL-MCNC: 2.8 MG/DL
PLATELET # BLD AUTO: 25 K/UL
PLATELET BLD QL SMEAR: ABNORMAL
PMV BLD AUTO: ABNORMAL FL
POTASSIUM SERPL-SCNC: 4.1 MMOL/L
PROT SERPL-MCNC: 5.9 G/DL
RBC # BLD AUTO: 2.38 M/UL
SODIUM SERPL-SCNC: 135 MMOL/L
WBC # BLD AUTO: 5.3 K/UL

## 2018-01-17 PROCEDURE — 76937 US GUIDE VASCULAR ACCESS: CPT

## 2018-01-17 PROCEDURE — 97535 SELF CARE MNGMENT TRAINING: CPT

## 2018-01-17 PROCEDURE — 85025 COMPLETE CBC W/AUTO DIFF WBC: CPT

## 2018-01-17 PROCEDURE — C1751 CATH, INF, PER/CENT/MIDLINE: HCPCS

## 2018-01-17 PROCEDURE — 80053 COMPREHEN METABOLIC PANEL: CPT

## 2018-01-17 PROCEDURE — 99233 SBSQ HOSP IP/OBS HIGH 50: CPT | Mod: ,,, | Performed by: INTERNAL MEDICINE

## 2018-01-17 PROCEDURE — 36415 COLL VENOUS BLD VENIPUNCTURE: CPT

## 2018-01-17 PROCEDURE — 63600175 PHARM REV CODE 636 W HCPCS: Performed by: STUDENT IN AN ORGANIZED HEALTH CARE EDUCATION/TRAINING PROGRAM

## 2018-01-17 PROCEDURE — 36569 INSJ PICC 5 YR+ W/O IMAGING: CPT

## 2018-01-17 PROCEDURE — 25000003 PHARM REV CODE 250: Performed by: STUDENT IN AN ORGANIZED HEALTH CARE EDUCATION/TRAINING PROGRAM

## 2018-01-17 PROCEDURE — 20600001 HC STEP DOWN PRIVATE ROOM

## 2018-01-17 PROCEDURE — 84100 ASSAY OF PHOSPHORUS: CPT

## 2018-01-17 PROCEDURE — 83735 ASSAY OF MAGNESIUM: CPT

## 2018-01-17 RX ORDER — CARVEDILOL 6.25 MG/1
6.25 TABLET ORAL 2 TIMES DAILY
Status: DISCONTINUED | OUTPATIENT
Start: 2018-01-17 | End: 2018-01-23 | Stop reason: HOSPADM

## 2018-01-17 RX ORDER — SODIUM CHLORIDE 9 MG/ML
INJECTION, SOLUTION INTRAVENOUS CONTINUOUS
Status: DISCONTINUED | OUTPATIENT
Start: 2018-01-17 | End: 2018-01-18

## 2018-01-17 RX ADMIN — MEMANTINE 10 MG: 10 TABLET ORAL at 09:01

## 2018-01-17 RX ADMIN — GALANTAMINE HYDROBROMIDE 16 MG: 4 TABLET, FILM COATED ORAL at 09:01

## 2018-01-17 RX ADMIN — CARVEDILOL 6.25 MG: 6.25 TABLET, FILM COATED ORAL at 11:01

## 2018-01-17 RX ADMIN — GABAPENTIN 300 MG: 300 CAPSULE ORAL at 04:01

## 2018-01-17 RX ADMIN — CARVEDILOL 6.25 MG: 6.25 TABLET, FILM COATED ORAL at 09:01

## 2018-01-17 RX ADMIN — GABAPENTIN 300 MG: 300 CAPSULE ORAL at 09:01

## 2018-01-17 RX ADMIN — VENLAFAXINE HYDROCHLORIDE 150 MG: 37.5 CAPSULE, EXTENDED RELEASE ORAL at 09:01

## 2018-01-17 RX ADMIN — MORPHINE SULFATE 15 MG: 15 TABLET ORAL at 06:01

## 2018-01-17 RX ADMIN — SODIUM CHLORIDE: 0.9 INJECTION, SOLUTION INTRAVENOUS at 04:01

## 2018-01-17 RX ADMIN — STANDARDIZED SENNA CONCENTRATE AND DOCUSATE SODIUM 1 TABLET: 8.6; 5 TABLET, FILM COATED ORAL at 09:01

## 2018-01-17 RX ADMIN — CEFEPIME HYDROCHLORIDE 1 G: 1 INJECTION, SOLUTION INTRAVENOUS at 05:01

## 2018-01-17 RX ADMIN — OLANZAPINE 7.5 MG: 2.5 TABLET, FILM COATED ORAL at 09:01

## 2018-01-17 RX ADMIN — FINASTERIDE 5 MG: 5 TABLET, FILM COATED ORAL at 04:01

## 2018-01-17 RX ADMIN — CEFEPIME HYDROCHLORIDE 1 G: 1 INJECTION, SOLUTION INTRAVENOUS at 02:01

## 2018-01-17 RX ADMIN — GABAPENTIN 300 MG: 300 CAPSULE ORAL at 06:01

## 2018-01-17 RX ADMIN — SODIUM CHLORIDE: 0.9 INJECTION, SOLUTION INTRAVENOUS at 08:01

## 2018-01-17 RX ADMIN — CEFEPIME HYDROCHLORIDE 1 G: 1 INJECTION, SOLUTION INTRAVENOUS at 10:01

## 2018-01-17 RX ADMIN — OSELTAMIVIR PHOSPHATE 75 MG: 75 CAPSULE ORAL at 09:01

## 2018-01-17 RX ADMIN — GALANTAMINE HYDROBROMIDE 16 MG: 4 TABLET, FILM COATED ORAL at 05:01

## 2018-01-17 RX ADMIN — FAMOTIDINE 20 MG: 20 TABLET, FILM COATED ORAL at 09:01

## 2018-01-17 RX ADMIN — SODIUM CHLORIDE: 0.9 INJECTION, SOLUTION INTRAVENOUS at 02:01

## 2018-01-17 RX ADMIN — FLUTICASONE FUROATE AND VILANTEROL TRIFENATATE 1 PUFF: 100; 25 POWDER RESPIRATORY (INHALATION) at 10:01

## 2018-01-17 RX ADMIN — Medication 1500 MG: at 04:01

## 2018-01-17 NOTE — ASSESSMENT & PLAN NOTE
-hypotensive on admission initially held home coreg and lisinopril  --start coreg 6.25 mg bid today for both Htn and tachycardia  -BP adequately controlled without medications at this time.

## 2018-01-17 NOTE — ASSESSMENT & PLAN NOTE
-had silva auto SCT 6/25/14 for myeloma, Day 1303  - 1 year restaging marrow on 7/24/15 showed 40% cellularity with trilineage hematopoiesis and no morphologic evidence of plasma cells.  Cytogenetics could not be done as the metaphase cells did not grow. SPEP/ELENO 12/28/17 without monoclonal peaks. FLC from 12/28 with kappa light chain of 4.3, ratio of 1.40   - PET CT did not reveal any evidence of myeloma at 1 year   - skeletal survey 12/28/17 was negative  - maintenance Revlimid on hold

## 2018-01-17 NOTE — PT/OT/SLP PROGRESS
Occupational Therapy   Treatment    Name: Lane Nunez Jr.  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  Pain/Comfort:  · Pain Rating 1: 0/10    Objective:     Patient found with: peripheral IV, telemetry    General Precautions: Standard, fall, aspiration   Orthopedic Precautions:N/A   Braces: N/A     Occupational Performance:    Activities of Daily Living:  · Bathing: moderate assistance spongebath while sitting on BSC  · UB Dressing: moderate assistance [place] and donning front gown  · Toileting: moderate assistance while on BSC    Patient left on BSC as he needed more time with nursing notified, wife present and all needs met.     [facility] 6 Click:  [facility] Total Score: 13    Assessment:     Lane Nunez Jr. is a 67 y.o. 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  Performance deficits affecting function are weakness, impaired endurance, impaired self care skills, impaired functional mobilty, gait instability, impaired balance, impaired cardiopulmonary response to activity, decreased safety awareness.      Rehab Prognosis:  good; patient would benefit from acute skilled OT services to address these deficits and reach maximum level of function.       Plan:     Patient to be seen 4 x/week to address the above listed problems via self-care/home management, therapeutic activities, therapeutic exercises  · Plan of Care Reviewed with: patient    This Plan of care has been discussed with the patient who was involved in its development and understands and is in agreement with the identified goals and treatment plan    GOALS:    Occupational Therapy Goals        Problem:  Occupational Therapy Goal    Goal Priority Disciplines Outcome Interventions   Occupational Therapy Goal     OT, PT/OT Ongoing (interventions implemented as appropriate)    Description:  Goals to be met by: 2/14/18     Patient will increase functional independence with ADLs by performing:    UE Dressing with Modified [place].  LE Dressing with Modified [place].  Grooming while standing at sink with Modified [place].  Toileting from toilet with Modified [place] for hygiene and clothing management.   Bathing from  edge of bed with Modified [place].  Toilet transfer to toilet with Modified [place].  Increased functional strength to WFL for B UE.  Upper extremity exercise program x15 reps per handout, with independence.                      Time Tracking:     OT Date of Treatment: 01/17/18  OT Start Time: 1415  OT Stop Time: 1440  OT Total Time (min): 25 min    Billable Minutes:Self Care/Home Management 25    YASMIN Kline  1/17/2018

## 2018-01-17 NOTE — PLAN OF CARE
Problem: Patient Care Overview  Goal: Plan of Care Review  Outcome: Ongoing (interventions implemented as appropriate)  Patient remains tachycardic on telemetry. IVF and cefepime continued. Urine culture sent. Instructed patient to call for assistance, bed low and locked, call bell within reach, nonskid socks on, patient verbalized understanding.

## 2018-01-17 NOTE — PROGRESS NOTES
[facility]  Hematology  Bone Marrow Transplant  Progress Note    Patient Name: Lane Nunez Jr.  Admission Date: 1/12/2018  Hospital Length of Stay: 5 days  Code Status: DNR    Subjective:     Interval History: Monitored by ICU team with a trial of IV lopressor.  HR brought down from 140 to 120s.  He reports feeling a little better after the medication.  Aside from the fever at 5PM, denies other new symptoms overnight.  Denies chest pain, cough, sore throat, abdominal pain, or diarrhea.    Objective:     Vital Signs (Most Recent):  Temp: 99.7 °F (37.6 °C) (01/17/18 1237)  Pulse: (!) 121 (01/17/18 1419)  Resp: 16 (01/17/18 1237)  BP: 116/74 (01/17/18 1237)  SpO2: 95 % (01/17/18 1237) Vital Signs (24h Range):  Temp:  [98.9 °F (37.2 °C)-101.1 °F (38.4 °C)] 99.7 °F (37.6 °C)  Pulse:  [114-142] 121  Resp:  [16-18] 16  SpO2:  [93 %-98 %] 95 %  BP: (104-136)/(55-74) 116/74     Weight: 99.5 kg (219 lb 5.7 oz)  Body mass index is 28.94 kg/m².  Body surface area is 2.26 meters squared.    ECOG SCORE         [unfilled]    Intake/Output - Last 3 Shifts       01/15 0700 - 01/16 0659 01/16 0700 - 01/17 0659 01/17 0700 - 01/18 0659    P.O. 240 720     I.V. (mL/kg) 1231.7 (12.4) 582 (5.8)     IV Piggyback 650 1150     Total Intake(mL/kg) 2121.7 (21.3) 2452 (24.6)     Urine (mL/kg/hr) 2575 (1.1) 3725 (1.6)     Stool  0 (0)     Total Output 2575 3725      Net -453.3 -1273             Stool Occurrence  1 x           Physical Exam   Constitutional: He appears well-developed.   HENT:   Head: Normocephalic.   Eyes: EOM are normal. Pupils are equal, round, and reactive to light.   Neck: Normal range of motion. No tracheal deviation present.   Cardiovascular: Regular rhythm and normal heart sounds.  Exam reveals no gallop and no friction rub.    No murmur heard.  tachycardic   Pulmonary/Chest: Effort normal and breath sounds normal. No respiratory distress. He has no wheezes. He has no rales.   Abdominal: Soft. Bowel  sounds are normal. He exhibits no distension and no mass. There is no tenderness. There is no guarding.   Musculoskeletal: He exhibits no edema.   Neurological: He is alert.   Slight left sided droop (baseline per wife) and right pupil slightly larger than left.  No other focal deficits.   Skin: Skin is warm and dry.   Multiple areas of erythematous papular and vesicular lesions throughout body  Previously identified open lesion on mid back is no longer bleeding.  Pillow case without blood.       Significant Labs:   CBC:   Recent Labs  Lab 01/16/18  0354 01/17/18  0559   WBC 6.22 5.30   HGB 7.8* 7.5*   HCT 24.0* 23.1*   PLT 26* 25*   , CMP:   Recent Labs  Lab 01/16/18  0354 01/17/18  0559    135*   K 4.2 4.1    102   CO2 28 25   * 106   BUN 12 11   CREATININE 0.8 0.8   CALCIUM 8.8 8.7   PROT 6.0 5.9*   ALBUMIN 1.8* 1.8*   BILITOT 0.4 0.6   ALKPHOS 150* 135   AST 14 21   ALT 14 14   ANIONGAP 8 8   EGFRNONAA >60.0 >60.0    and Coagulation: No results for input(s): PT, INR, APTT in the last 48 hours.    Diagnostic Results:  I have reviewed all pertinent imaging results/findings within the past 24 hours.       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 BM Bx negative. Initially planned for treatment with brentuximab.  Stage II ALCL (chest lymphadenopathy only)  -skin bx on 12/28/17- SKIN, RIGHT UPPER BACK, EXCISIONAL BIOPSY: CD30-positive, ALK negative T-cell lymphoproliferative disorder  -Port study today.  From CXR, appears to be in brachiocephalic veins.  Will not need central access for brentuximab.  -Holding prednisone for concern it may have caused strain on the heart.  Held off on brentuximab due to tachycardia since 1/13/18.  Reviewed old progress notes and he had been tachycardic on previous admission in 110s.  -Viral workup HIV and hepatitis negative. EBV and CMV negative.  -daily TLS labs  --LDH downtrend  1353 to 1235, uric acid downtrend 5.3 to 4.8  -Reconsidering treatment with brentuximab while inpatient.  -Has port that is located in brachiocephalic v.  Port study 1/15/18 identified fibrin sheath at catheter tip  --If no more fevers in next 48 hours, will order for port to be removed.        Tachycardia    Unclear etiology of tachycardia but leading ddx with fever 1/14/18 is UTI. Qtc wnl.  Sinus tachycardia since admission.  Denies chest pain or SOB but feels weak.  Denies dizziness at this time. Slight improvement from 140s to 130s after IVF. Otherwise not compromising BP at this time.  -Ddx includes drug related  --prednisone 75mg qday held  --other drugs : galantamine (causes bradycardia, heart block; ?possible compensatory response).  Memantine (hyper- or hypotension).  Olanzapine (conduction abnormalities), venlafaxine (tachycardia, hypotension)  ---consider consult neuro for de-escalation  -Ruled out ACS with trop x 3 negative.   -slight improvement with hydration and review of vitals show lowest HR when sleeping  -continue with monitoring  -With improved HR post IV lopressor trial 1/16/18, will restart home coreg 6.25mg BID        Thrombocytopenia    Concern for ITP on previous admission for which he was empirically started on prednisone 100mg qday without response.  Thrombocytopenia had been slowly worsening over the past year and particularly has been lower since 11/2017.  -Stopped prednisone 75mg qday due to tachycardia and potential heart strain with known history of systolic dysfunction.  -monitoring platelets daily; has not required plt transfusion.  Epistaxis 1/16/18 self-limited.  Will hold on transfusion.        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  HIT panels from 6/2014 show two OD readings of 1.034 and 1.212.          Constipation    Resolved. Wife reports that he hasn't had a bowel movement since admission.  May haved contributed to urinary retention. BM 1/16/18.  -continue  senna/colace and increase miralax to TID PRN.        Fever    Febrile 1/14/18.  With urinary retention, suspect UTI as leading source.  Differentials include oral with dental caries seen on physical exam.  He denies URI symptoms and cxr without infiltrate.  -1/12 and 1/14 blood cx ngtd.  Urine cx NGTD.  RVP pending.  -continue with abx.  Day 4 of cefepime and vancomycin          Glaucoma    -continue home eye drops         Neuropathy    -continue home gabapentin        Dysphagia    - possibly secondary to dementia  - He has refused a G-tube on multiple occasions in the past per chart  - During last admission, patient had regular diet with nectar thick liquids and aspiration precautions          Multiple myeloma in remission    See oncologic hx on H&P for more information  - had silva auto 6/25/14   - was in CR at 1 year  - maintenance Revlimid on hold for 4 weeks due to thrombocytopenia. Will continue to hold at this time  - now with BLE weakness, inability to walk--Will likely need PT/OT after therapy  - recent SPEP with normal protein, FLC from 12/28 with kappa light chain of 4.3, ratio of 1.40   - bone marrow biopsy 12/28/17 : NO MORPHOLOGIC OR IMMUNOPHENOTYPIC EVIDENCE OF RESIDUAL PLASMA CELL NEOPLASM.  -- NO MORPHOLOGIC OR IMMUNOPHENOTYPIC EVIDENCE OF MARROW INVOLVEMENT BY  CD30-POSITIVE T-CELL LYMPHOPROLIFERATIVE DISORDER        Cytopenia    Anemia and thrombocytopenia likely 2/2 disease  - Revlimid has been on hold due to thrombocytopenia  - Transfuse for hgb <7 g/dL, plt <10 k/uL   - dc'ed ASA 81mg qday with plt <50k        Cardiomyopathy    -hypotensive on admission, holding lasix 20 mg daily.  Currently autodiuresing.  - hx of decreased EF 30-40%    --may consider repeat echo given persistent tachycardia        Hypertension    -hypotensive on admission initially held home coreg and lisinopril  --start coreg 6.25 mg bid today for both Htn and tachycardia  -BP adequately controlled without medications at this  time.        History of peripheral stem cell transplant    -had silva auto SCT 6/25/14 for myeloma, Day 1303  - 1 year restaging marrow on 7/24/15 showed 40% cellularity with trilineage hematopoiesis and no morphologic evidence of plasma cells.  Cytogenetics could not be done as the metaphase cells did not grow. SPEP/ELENO 12/28/17 without monoclonal peaks. FLC from 12/28 with kappa light chain of 4.3, ratio of 1.40   - PET CT did not reveal any evidence of myeloma at 1 year   - skeletal survey 12/28/17 was negative  - maintenance Revlimid on hold         Debility    -pt complaining of worsening orthostatic symptoms, decreased PO intake, and weakness over the past week  -maintenance fluids 100 cc/hr x10 hrs  -boost  -PT/OT consulted, recommend SNF.  Consult placed to Brendan.        BPH (benign prostatic hyperplasia)    BPH and was straight cath'd 500cc 1/14/18.  Wife also reports constipation.  Urinary retention either not well controlled with home finasteride or exacerbated by constipation.  -continue home finasteride     --consider addition of tamsulosin if requiring persistent straight caths  -bladder scan qshift        Memory impairment    - pt with dementia  - continue home memantine and galantamine         Depression    -Continue home amitriptyline, olanzapine, and venlafaxine            VTE Risk Mitigation         Ordered     Medium Risk of VTE  Once      01/12/18 1740     Place sequential compression device  Until discontinued      01/12/18 1740          Disposition: inpatient    Russel Martin MD  Bone Marrow Transplant  [facility]

## 2018-01-17 NOTE — PLAN OF CARE
Problem: Occupational Therapy Goal  Goal: Occupational Therapy Goal  Goals to be met by: 2/14/18     Patient will increase functional independence with ADLs by performing:    UE Dressing with Modified [place].  LE Dressing with Modified [place].  Grooming while standing at sink with Modified [place].  Toileting from toilet with Modified [place] for hygiene and clothing management.   Bathing from  edge of bed with Modified [place].  Toilet transfer to toilet with Modified [place].  Increased functional strength to WFL for B UE.  Upper extremity exercise program x15 reps per handout, with independence.     Outcome: Ongoing (interventions implemented as appropriate)  Patient's goal are appropriate.  YASMIN Kline  1/17/2018

## 2018-01-17 NOTE — ASSESSMENT & PLAN NOTE
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 BM Bx negative. Initially planned for treatment with brentuximab.  Stage II ALCL (chest lymphadenopathy only)  -skin bx on 12/28/17- SKIN, RIGHT UPPER BACK, EXCISIONAL BIOPSY: CD30-positive, ALK negative T-cell lymphoproliferative disorder  -Port study today.  From CXR, appears to be in brachiocephalic veins.  Will not need central access for brentuximab.  -Holding prednisone for concern it may have caused strain on the heart.  Held off on brentuximab due to tachycardia since 1/13/18.  Reviewed old progress notes and he had been tachycardic on previous admission in 110s.  -Viral workup HIV and hepatitis negative. EBV and CMV negative.  -daily TLS labs  --LDH downtrend 1353 to 1235, uric acid downtrend 5.3 to 4.8  -Reconsidering treatment with brentuximab while inpatient.  -Has port that is located in brachiocephalic v.  Port study 1/15/18 identified fibrin sheath at catheter tip  --If no more fevers in next 48 hours, will order for port to be removed.

## 2018-01-17 NOTE — ASSESSMENT & PLAN NOTE
Febrile 1/14/18.  With urinary retention, suspect UTI as leading source.  Differentials include oral with dental caries seen on physical exam.  He denies URI symptoms and cxr without infiltrate.  -1/12 and 1/14 blood cx ngtd.  Urine cx NGTD.  RVP pending.  -continue with abx.  Day 4 of cefepime and vancomycin

## 2018-01-17 NOTE — ASSESSMENT & PLAN NOTE
Unclear etiology of tachycardia but leading ddx with fever 1/14/18 is UTI. Qtc wnl.  Sinus tachycardia since admission.  Denies chest pain or SOB but feels weak.  Denies dizziness at this time. Slight improvement from 140s to 130s after IVF. Otherwise not compromising BP at this time.  -Ddx includes drug related  --prednisone 75mg qday held  --other drugs : galantamine (causes bradycardia, heart block; ?possible compensatory response).  Memantine (hyper- or hypotension).  Olanzapine (conduction abnormalities), venlafaxine (tachycardia, hypotension)  ---consider consult neuro for de-escalation  -Ruled out ACS with trop x 3 negative.   -slight improvement with hydration and review of vitals show lowest HR when sleeping  -continue with monitoring  -With improved HR post IV lopressor trial 1/16/18, will restart home coreg 6.25mg BID

## 2018-01-17 NOTE — ASSESSMENT & PLAN NOTE
-pt complaining of worsening orthostatic symptoms, decreased PO intake, and weakness over the past week  -maintenance fluids 100 cc/hr x10 hrs  -boost  -PT/OT consulted, recommend SNF.  Consult placed to Brendan.

## 2018-01-17 NOTE — CONSULTS
Midline placed  in right brachial , [id number] Lot# [id number] Removal date on or before 2/15/17. Needle advanced into vessel with real time ultrasound guidance. Image recorded and saved.

## 2018-01-17 NOTE — ASSESSMENT & PLAN NOTE
Resolved. Wife reports that he hasn't had a bowel movement since admission.  May haved contributed to urinary retention. BM 1/16/18.  -continue senna/colace and increase miralax to TID PRN.

## 2018-01-17 NOTE — SUBJECTIVE & OBJECTIVE
Subjective:     Interval History: Monitored by ICU team with a trial of IV lopressor.  HR brought down from 140 to 120s.  He reports feeling a little better after the medication.  Aside from the fever at 5PM, denies other new symptoms overnight.  Denies chest pain, cough, sore throat, abdominal pain, or diarrhea.    Objective:     Vital Signs (Most Recent):  Temp: 99.7 °F (37.6 °C) (01/17/18 1237)  Pulse: (!) 121 (01/17/18 1419)  Resp: 16 (01/17/18 1237)  BP: 116/74 (01/17/18 1237)  SpO2: 95 % (01/17/18 1237) Vital Signs (24h Range):  Temp:  [98.9 °F (37.2 °C)-101.1 °F (38.4 °C)] 99.7 °F (37.6 °C)  Pulse:  [114-142] 121  Resp:  [16-18] 16  SpO2:  [93 %-98 %] 95 %  BP: (104-136)/(55-74) 116/74     Weight: 99.5 kg (219 lb 5.7 oz)  Body mass index is 28.94 kg/m².  Body surface area is 2.26 meters squared.    ECOG SCORE         [unfilled]    Intake/Output - Last 3 Shifts       01/15 0700 - 01/16 0659 01/16 0700 - 01/17 0659 01/17 0700 - 01/18 0659    P.O. 240 720     I.V. (mL/kg) 1231.7 (12.4) 582 (5.8)     IV Piggyback 650 1150     Total Intake(mL/kg) 2121.7 (21.3) 2452 (24.6)     Urine (mL/kg/hr) 2575 (1.1) 3725 (1.6)     Stool  0 (0)     Total Output 2575 3725      Net -453.3 -1273             Stool Occurrence  1 x           Physical Exam   Constitutional: He appears well-developed.   HENT:   Head: Normocephalic.   Eyes: EOM are normal. Pupils are equal, round, and reactive to light.   Neck: Normal range of motion. No tracheal deviation present.   Cardiovascular: Regular rhythm and normal heart sounds.  Exam reveals no gallop and no friction rub.    No murmur heard.  tachycardic   Pulmonary/Chest: Effort normal and breath sounds normal. No respiratory distress. He has no wheezes. He has no rales.   Abdominal: Soft. Bowel sounds are normal. He exhibits no distension and no mass. There is no tenderness. There is no guarding.   Musculoskeletal: He exhibits no edema.   Neurological: He is alert.   Slight left sided droop  (baseline per wife) and right pupil slightly larger than left.  No other focal deficits.   Skin: Skin is warm and dry.   Multiple areas of erythematous papular and vesicular lesions throughout body  Previously identified open lesion on mid back is no longer bleeding.  Pillow case without blood.       Significant Labs:   CBC:   Recent Labs  Lab 01/16/18  0354 01/17/18  0559   WBC 6.22 5.30   HGB 7.8* 7.5*   HCT 24.0* 23.1*   PLT 26* 25*   , CMP:   Recent Labs  Lab 01/16/18  0354 01/17/18  0559    135*   K 4.2 4.1    102   CO2 28 25   * 106   BUN 12 11   CREATININE 0.8 0.8   CALCIUM 8.8 8.7   PROT 6.0 5.9*   ALBUMIN 1.8* 1.8*   BILITOT 0.4 0.6   ALKPHOS 150* 135   AST 14 21   ALT 14 14   ANIONGAP 8 8   EGFRNONAA >60.0 >60.0    and Coagulation: No results for input(s): PT, INR, APTT in the last 48 hours.    Diagnostic Results:  I have reviewed all pertinent imaging results/findings within the past 24 hours.

## 2018-01-18 LAB
ALBUMIN SERPL BCP-MCNC: 1.9 G/DL
ALP SERPL-CCNC: 153 U/L
ALT SERPL W/O P-5'-P-CCNC: 16 U/L
ANION GAP SERPL CALC-SCNC: 11 MMOL/L
ANISOCYTOSIS BLD QL SMEAR: SLIGHT
AST SERPL-CCNC: 15 U/L
BACTERIA UR CULT: NO GROWTH
BASOPHILS # BLD AUTO: 0.01 K/UL
BASOPHILS NFR BLD: 0.2 %
BILIRUB SERPL-MCNC: 0.4 MG/DL
BUN SERPL-MCNC: 12 MG/DL
CALCIUM SERPL-MCNC: 9.1 MG/DL
CHLORIDE SERPL-SCNC: 102 MMOL/L
CO2 SERPL-SCNC: 25 MMOL/L
CREAT SERPL-MCNC: 0.9 MG/DL
DIFFERENTIAL METHOD: ABNORMAL
EOSINOPHIL # BLD AUTO: 0 K/UL
EOSINOPHIL NFR BLD: 0.7 %
ERYTHROCYTE [DISTWIDTH] IN BLOOD BY AUTOMATED COUNT: 17.5 %
EST. GFR  (AFRICAN AMERICAN): >60 ML/MIN/1.73 M^2
EST. GFR  (NON AFRICAN AMERICAN): >60 ML/MIN/1.73 M^2
GLUCOSE SERPL-MCNC: 101 MG/DL
HCT VFR BLD AUTO: 23.5 %
HGB BLD-MCNC: 7.5 G/DL
IMM GRANULOCYTES # BLD AUTO: 0.01 K/UL
IMM GRANULOCYTES NFR BLD AUTO: 0.2 %
LYMPHOCYTES # BLD AUTO: 1 K/UL
LYMPHOCYTES NFR BLD: 25.4 %
MAGNESIUM SERPL-MCNC: 1.7 MG/DL
MCH RBC QN AUTO: 31 PG
MCHC RBC AUTO-ENTMCNC: 31.9 G/DL
MCV RBC AUTO: 97 FL
MONOCYTES # BLD AUTO: 0.5 K/UL
MONOCYTES NFR BLD: 12.8 %
NEUTROPHILS # BLD AUTO: 2.5 K/UL
NEUTROPHILS NFR BLD: 60.7 %
NRBC BLD-RTO: 1 /100 WBC
PHOSPHATE SERPL-MCNC: 2.7 MG/DL
PLATELET # BLD AUTO: 29 K/UL
PLATELET BLD QL SMEAR: ABNORMAL
PMV BLD AUTO: 11.9 FL
POTASSIUM SERPL-SCNC: 3.8 MMOL/L
PROT SERPL-MCNC: 6 G/DL
RBC # BLD AUTO: 2.42 M/UL
SODIUM SERPL-SCNC: 138 MMOL/L
VANCOMYCIN TROUGH SERPL-MCNC: 15 UG/ML
WBC # BLD AUTO: 4.06 K/UL

## 2018-01-18 PROCEDURE — 25500020 PHARM REV CODE 255: Performed by: INTERNAL MEDICINE

## 2018-01-18 PROCEDURE — 25000003 PHARM REV CODE 250: Performed by: STUDENT IN AN ORGANIZED HEALTH CARE EDUCATION/TRAINING PROGRAM

## 2018-01-18 PROCEDURE — 63600175 PHARM REV CODE 636 W HCPCS: Performed by: STUDENT IN AN ORGANIZED HEALTH CARE EDUCATION/TRAINING PROGRAM

## 2018-01-18 PROCEDURE — 97530 THERAPEUTIC ACTIVITIES: CPT

## 2018-01-18 PROCEDURE — 83735 ASSAY OF MAGNESIUM: CPT

## 2018-01-18 PROCEDURE — 80202 ASSAY OF VANCOMYCIN: CPT

## 2018-01-18 PROCEDURE — 20600001 HC STEP DOWN PRIVATE ROOM

## 2018-01-18 PROCEDURE — 85025 COMPLETE CBC W/AUTO DIFF WBC: CPT

## 2018-01-18 PROCEDURE — 84100 ASSAY OF PHOSPHORUS: CPT

## 2018-01-18 PROCEDURE — 99233 SBSQ HOSP IP/OBS HIGH 50: CPT | Mod: ,,, | Performed by: INTERNAL MEDICINE

## 2018-01-18 PROCEDURE — 80053 COMPREHEN METABOLIC PANEL: CPT

## 2018-01-18 RX ORDER — SODIUM CHLORIDE 9 MG/ML
INJECTION, SOLUTION INTRAVENOUS CONTINUOUS
Status: ACTIVE | OUTPATIENT
Start: 2018-01-18 | End: 2018-01-18

## 2018-01-18 RX ORDER — CIPROFLOXACIN 500 MG/1
500 TABLET ORAL EVERY 12 HOURS
Status: DISCONTINUED | OUTPATIENT
Start: 2018-01-18 | End: 2018-01-19

## 2018-01-18 RX ADMIN — GALANTAMINE HYDROBROMIDE 16 MG: 4 TABLET, FILM COATED ORAL at 08:01

## 2018-01-18 RX ADMIN — FLUTICASONE FUROATE AND VILANTEROL TRIFENATATE 1 PUFF: 100; 25 POWDER RESPIRATORY (INHALATION) at 08:01

## 2018-01-18 RX ADMIN — CEFEPIME HYDROCHLORIDE 1 G: 1 INJECTION, SOLUTION INTRAVENOUS at 06:01

## 2018-01-18 RX ADMIN — FAMOTIDINE 20 MG: 20 TABLET, FILM COATED ORAL at 09:01

## 2018-01-18 RX ADMIN — GALANTAMINE HYDROBROMIDE 16 MG: 4 TABLET, FILM COATED ORAL at 04:01

## 2018-01-18 RX ADMIN — CYCLOBENZAPRINE HYDROCHLORIDE 5 MG: 5 TABLET, FILM COATED ORAL at 12:01

## 2018-01-18 RX ADMIN — OLANZAPINE 7.5 MG: 2.5 TABLET, FILM COATED ORAL at 09:01

## 2018-01-18 RX ADMIN — MORPHINE SULFATE 15 MG: 15 TABLET ORAL at 07:01

## 2018-01-18 RX ADMIN — GABAPENTIN 300 MG: 300 CAPSULE ORAL at 02:01

## 2018-01-18 RX ADMIN — VENLAFAXINE HYDROCHLORIDE 150 MG: 37.5 CAPSULE, EXTENDED RELEASE ORAL at 08:01

## 2018-01-18 RX ADMIN — IOHEXOL 100 ML: 350 INJECTION, SOLUTION INTRAVENOUS at 12:01

## 2018-01-18 RX ADMIN — FAMOTIDINE 20 MG: 20 TABLET, FILM COATED ORAL at 08:01

## 2018-01-18 RX ADMIN — Medication 1500 MG: at 06:01

## 2018-01-18 RX ADMIN — SODIUM CHLORIDE: 0.9 INJECTION, SOLUTION INTRAVENOUS at 01:01

## 2018-01-18 RX ADMIN — MORPHINE SULFATE 15 MG: 15 TABLET ORAL at 12:01

## 2018-01-18 RX ADMIN — CEFEPIME HYDROCHLORIDE 1 G: 1 INJECTION, SOLUTION INTRAVENOUS at 11:01

## 2018-01-18 RX ADMIN — CARVEDILOL 6.25 MG: 6.25 TABLET, FILM COATED ORAL at 09:01

## 2018-01-18 RX ADMIN — MORPHINE SULFATE 15 MG: 15 TABLET ORAL at 06:01

## 2018-01-18 RX ADMIN — FINASTERIDE 5 MG: 5 TABLET, FILM COATED ORAL at 02:01

## 2018-01-18 RX ADMIN — CARVEDILOL 6.25 MG: 6.25 TABLET, FILM COATED ORAL at 08:01

## 2018-01-18 RX ADMIN — GABAPENTIN 300 MG: 300 CAPSULE ORAL at 09:01

## 2018-01-18 RX ADMIN — GABAPENTIN 300 MG: 300 CAPSULE ORAL at 06:01

## 2018-01-18 RX ADMIN — MEMANTINE 10 MG: 10 TABLET ORAL at 09:01

## 2018-01-18 RX ADMIN — CEFEPIME HYDROCHLORIDE 1 G: 1 INJECTION, SOLUTION INTRAVENOUS at 02:01

## 2018-01-18 RX ADMIN — MEMANTINE 10 MG: 10 TABLET ORAL at 08:01

## 2018-01-18 RX ADMIN — CIPROFLOXACIN HYDROCHLORIDE 500 MG: 500 TABLET, FILM COATED ORAL at 09:01

## 2018-01-18 NOTE — PT/OT/SLP PROGRESS
Occupational Therapy   Treatment    Name: Lane Nunez Jr.  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  Pain/Comfort:  · Pain Rating 1: 0/10  · Pain Rating Post-Intervention 1: 0/10    Objective:     Patient found with: peripheral IV, telemetry     Pt given option to perform ADLs, therapeutic exercise or, ambulation. Pt requested to ambulate.     General Precautions: Standard, fall, aspiration   Orthopedic Precautions:N/A   Braces: N/A     Occupational Performance:    Bed Mobility:    · Patient completed Rolling/Turning to Right with supervision  · Patient completed Scooting/Bridging with supervision  · Patient completed Supine to Sit with supervision     Functional Mobility/Transfers:  · Patient completed Sit <> Stand Transfer with stand by assistance  with  rolling walker       Patient left up in chair with all lines intact, call button in reach and RN notified    [facility] 6 Click:  [facility] Total Score: 13    Treatment & Education:  Pt ambulated 90 ft at cga w/ RW. Pt became nauseas and requested to be sat down. Pt became asymptomatic w/ seated rest break.   Pt t/f from w/c to bedside chair at min a w/o AD.   RN educated on pt t/f status.   Education:    Assessment:     Lane Nunez Jr. is a 67 y.o. 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  He presents with impairments listed below. Pt dispayed decreased endurance w/ oob activities. Pt did well to participate in session. Pt displayed improved ability to ambulate. Pt would benefit from skilled OT services to improve independence and overall occupational functioning.      Performance deficits affecting function are  weakness, impaired endurance, impaired self care skills, impaired functional mobilty, gait instability, decreased safety awareness.      Rehab Prognosis:  good; patient would benefit from acute skilled OT services to address these deficits and reach maximum level of function.       Plan:     Patient to be seen 4 x/week to address the above listed problems via self-care/home management, therapeutic activities, therapeutic exercises, wheelchair management/training  · Plan of Care Expires:    · Plan of Care Reviewed with: patient, spouse    This Plan of care has been discussed with the patient who was involved in its development and understands and is in agreement with the identified goals and treatment plan    GOALS:    Occupational Therapy Goals        Problem: Occupational Therapy Goal    Goal Priority Disciplines Outcome Interventions   Occupational Therapy Goal     OT, PT/OT Ongoing (interventions implemented as appropriate)    Description:  Goals to be met by: 2/14/18     Patient will increase functional independence with ADLs by performing:    UE Dressing with Modified [place].  LE Dressing with Modified [place].  Grooming while standing at sink with Modified [place].  Toileting from toilet with Modified [place] for hygiene and clothing management.   Bathing from  edge of bed with Modified [place].  Toilet transfer to toilet with Modified [place].  Increased functional strength to WFL for B UE.  Upper extremity exercise program x15 reps per handout, with independence.                      Time Tracking:     OT Date of Treatment: 01/18/18  OT Start Time: 1014  OT Stop Time: 1027  OT Total Time (min): 13 min    Billable Minutes:Therapeutic Activity 13 minutes    Louis Anderson, OT  1/18/2018

## 2018-01-18 NOTE — PLAN OF CARE
Problem: Patient Care Overview  Goal: Plan of Care Review  Outcome: Ongoing (interventions implemented as appropriate)  Pt AAOx4; up with one-assist/stand by to chair today with OT/PT and to wheelchair with minimal nurse assist. Vital signs stable, pt began tachycardia with afternoon vitals, nurse to continue to monitor, pt is currently afebrile with tachycardia. IV abx continued as ordered. Pt had CTA chest done around noon. Pt remained free from falls during shift.  Bed lowest position and locked.  Call light in reach.  Pt has no further needs at this time; will continue to monitor.

## 2018-01-18 NOTE — PROGRESS NOTES
SW met with pt and spouse at bedside to inform them [facility] declined pt and recommended LT skilled. Spouse agreeable to referrals being sent to [facility] and [facility]. SW sent referrals via rightx.

## 2018-01-18 NOTE — PLAN OF CARE
Problem: Occupational Therapy Goal  Goal: Occupational Therapy Goal  Goals to be met by: 2/14/18     Patient will increase functional independence with ADLs by performing:    UE Dressing with Modified [place].  LE Dressing with Modified [place].  Grooming while standing at sink with Modified [place].  Toileting from toilet with Modified [place] for hygiene and clothing management.   Bathing from  edge of bed with Modified [place].  Toilet transfer to toilet with Modified [place].  Increased functional strength to WFL for B UE.  Upper extremity exercise program x15 reps per handout, with independence.     Continue OT POC    Comments: Louis Anderson OTR/L  1/18/2018

## 2018-01-18 NOTE — CONSULTS
Wound care consult received for lymphoma lesions to back.  Lesions to back are not draining at this time.  Recommend Sween moisturizer to back daily to protect skin.  Noted left antecubital lesion with scant blood.  Recommend application of mepilex border.  Nurse has already ordered the mepilex border.    Ordered spr pump to attach to pt bed surface to provide MICHELL and climate control to prevent skin breakdown. Discussed plan of care with nurse and pt.  Wound care to follow prn  [id number]

## 2018-01-19 PROBLEM — R07.9 CHEST PAIN: Status: ACTIVE | Noted: 2018-01-19

## 2018-01-19 LAB
ALBUMIN SERPL BCP-MCNC: 1.9 G/DL
ALP SERPL-CCNC: 143 U/L
ALT SERPL W/O P-5'-P-CCNC: 14 U/L
ANION GAP SERPL CALC-SCNC: 8 MMOL/L
ANISOCYTOSIS BLD QL SMEAR: SLIGHT
AST SERPL-CCNC: 14 U/L
BACTERIA BLD CULT: NORMAL
BACTERIA BLD CULT: NORMAL
BASOPHILS # BLD AUTO: 0.01 K/UL
BASOPHILS NFR BLD: 0.2 %
BILIRUB SERPL-MCNC: 0.4 MG/DL
BUN SERPL-MCNC: 12 MG/DL
CALCIUM SERPL-MCNC: 9 MG/DL
CHLORIDE SERPL-SCNC: 104 MMOL/L
CO2 SERPL-SCNC: 26 MMOL/L
CREAT SERPL-MCNC: 0.9 MG/DL
DIFFERENTIAL METHOD: ABNORMAL
EOSINOPHIL # BLD AUTO: 0 K/UL
EOSINOPHIL NFR BLD: 0.7 %
ERYTHROCYTE [DISTWIDTH] IN BLOOD BY AUTOMATED COUNT: 18 %
EST. GFR  (AFRICAN AMERICAN): >60 ML/MIN/1.73 M^2
EST. GFR  (NON AFRICAN AMERICAN): >60 ML/MIN/1.73 M^2
FOLATE SERPL-MCNC: 11.6 NG/ML
GIANT PLATELETS BLD QL SMEAR: PRESENT
GLUCOSE SERPL-MCNC: 98 MG/DL
HCT VFR BLD AUTO: 21.9 %
HGB BLD-MCNC: 7.2 G/DL
HYPOCHROMIA BLD QL SMEAR: ABNORMAL
IMM GRANULOCYTES # BLD AUTO: 0.01 K/UL
IMM GRANULOCYTES NFR BLD AUTO: 0.2 %
LYMPHOCYTES # BLD AUTO: 1.1 K/UL
LYMPHOCYTES NFR BLD: 24.8 %
MAGNESIUM SERPL-MCNC: 1.9 MG/DL
MCH RBC QN AUTO: 31.7 PG
MCHC RBC AUTO-ENTMCNC: 32.9 G/DL
MCV RBC AUTO: 97 FL
MONOCYTES # BLD AUTO: 0.6 K/UL
MONOCYTES NFR BLD: 13.5 %
NEUTROPHILS # BLD AUTO: 2.7 K/UL
NEUTROPHILS NFR BLD: 60.6 %
NRBC BLD-RTO: 1 /100 WBC
OVALOCYTES BLD QL SMEAR: ABNORMAL
PHOSPHATE SERPL-MCNC: 3 MG/DL
PLATELET # BLD AUTO: 24 K/UL
PLATELET BLD QL SMEAR: ABNORMAL
PMV BLD AUTO: 13.8 FL
POIKILOCYTOSIS BLD QL SMEAR: SLIGHT
POLYCHROMASIA BLD QL SMEAR: ABNORMAL
POTASSIUM SERPL-SCNC: 4 MMOL/L
PROT SERPL-MCNC: 5.9 G/DL
RBC # BLD AUTO: 2.27 M/UL
SCHISTOCYTES BLD QL SMEAR: PRESENT
SODIUM SERPL-SCNC: 138 MMOL/L
TROPONIN I SERPL DL<=0.01 NG/ML-MCNC: 0.02 NG/ML
TROPONIN I SERPL DL<=0.01 NG/ML-MCNC: <0.006 NG/ML
VIT B12 SERPL-MCNC: 384 PG/ML
WBC # BLD AUTO: 4.43 K/UL

## 2018-01-19 PROCEDURE — 80053 COMPREHEN METABOLIC PANEL: CPT

## 2018-01-19 PROCEDURE — 84100 ASSAY OF PHOSPHORUS: CPT

## 2018-01-19 PROCEDURE — 84484 ASSAY OF TROPONIN QUANT: CPT | Mod: 91

## 2018-01-19 PROCEDURE — 82607 VITAMIN B-12: CPT

## 2018-01-19 PROCEDURE — 83735 ASSAY OF MAGNESIUM: CPT

## 2018-01-19 PROCEDURE — 93005 ELECTROCARDIOGRAM TRACING: CPT

## 2018-01-19 PROCEDURE — 85025 COMPLETE CBC W/AUTO DIFF WBC: CPT

## 2018-01-19 PROCEDURE — 93010 ELECTROCARDIOGRAM REPORT: CPT | Mod: ,,, | Performed by: INTERNAL MEDICINE

## 2018-01-19 PROCEDURE — 82746 ASSAY OF FOLIC ACID SERUM: CPT

## 2018-01-19 PROCEDURE — 20600001 HC STEP DOWN PRIVATE ROOM

## 2018-01-19 PROCEDURE — 97530 THERAPEUTIC ACTIVITIES: CPT

## 2018-01-19 PROCEDURE — 25000003 PHARM REV CODE 250: Performed by: STUDENT IN AN ORGANIZED HEALTH CARE EDUCATION/TRAINING PROGRAM

## 2018-01-19 PROCEDURE — 36415 COLL VENOUS BLD VENIPUNCTURE: CPT

## 2018-01-19 PROCEDURE — 82525 ASSAY OF COPPER: CPT

## 2018-01-19 PROCEDURE — 99233 SBSQ HOSP IP/OBS HIGH 50: CPT | Mod: ,,, | Performed by: INTERNAL MEDICINE

## 2018-01-19 RX ORDER — DOXYCYCLINE HYCLATE 100 MG
100 TABLET ORAL EVERY 12 HOURS
Status: DISCONTINUED | OUTPATIENT
Start: 2018-01-19 | End: 2018-01-20

## 2018-01-19 RX ADMIN — GABAPENTIN 300 MG: 300 CAPSULE ORAL at 09:01

## 2018-01-19 RX ADMIN — GABAPENTIN 300 MG: 300 CAPSULE ORAL at 06:01

## 2018-01-19 RX ADMIN — GALANTAMINE HYDROBROMIDE 16 MG: 4 TABLET, FILM COATED ORAL at 08:01

## 2018-01-19 RX ADMIN — STANDARDIZED SENNA CONCENTRATE AND DOCUSATE SODIUM 1 TABLET: 8.6; 5 TABLET, FILM COATED ORAL at 08:01

## 2018-01-19 RX ADMIN — MORPHINE SULFATE 15 MG: 15 TABLET ORAL at 08:01

## 2018-01-19 RX ADMIN — CARVEDILOL 6.25 MG: 6.25 TABLET, FILM COATED ORAL at 08:01

## 2018-01-19 RX ADMIN — MEMANTINE 10 MG: 10 TABLET ORAL at 08:01

## 2018-01-19 RX ADMIN — FAMOTIDINE 20 MG: 20 TABLET, FILM COATED ORAL at 08:01

## 2018-01-19 RX ADMIN — FINASTERIDE 5 MG: 5 TABLET, FILM COATED ORAL at 01:01

## 2018-01-19 RX ADMIN — FLUTICASONE FUROATE AND VILANTEROL TRIFENATATE 1 PUFF: 100; 25 POWDER RESPIRATORY (INHALATION) at 09:01

## 2018-01-19 RX ADMIN — VENLAFAXINE HYDROCHLORIDE 150 MG: 37.5 CAPSULE, EXTENDED RELEASE ORAL at 08:01

## 2018-01-19 RX ADMIN — MORPHINE SULFATE 15 MG: 15 TABLET ORAL at 01:01

## 2018-01-19 RX ADMIN — OLANZAPINE 7.5 MG: 2.5 TABLET, FILM COATED ORAL at 08:01

## 2018-01-19 RX ADMIN — GABAPENTIN 300 MG: 300 CAPSULE ORAL at 01:01

## 2018-01-19 RX ADMIN — DOXYCYCLINE HYCLATE 100 MG: 100 TABLET, COATED ORAL at 08:01

## 2018-01-19 RX ADMIN — DOXYCYCLINE HYCLATE 100 MG: 100 TABLET, COATED ORAL at 11:01

## 2018-01-19 RX ADMIN — MORPHINE SULFATE 15 MG: 15 TABLET ORAL at 02:01

## 2018-01-19 RX ADMIN — CIPROFLOXACIN HYDROCHLORIDE 500 MG: 500 TABLET, FILM COATED ORAL at 08:01

## 2018-01-19 RX ADMIN — GALANTAMINE HYDROBROMIDE 16 MG: 4 TABLET, FILM COATED ORAL at 05:01

## 2018-01-19 RX ADMIN — CYCLOBENZAPRINE HYDROCHLORIDE 5 MG: 5 TABLET, FILM COATED ORAL at 02:01

## 2018-01-19 NOTE — PT/OT/SLP PROGRESS
"Physical Therapy Treatment    Patient Name:  Lane Nunez Jr.   MRN:  [id number]    Recommendations:     Discharge Recommendations:  nursing facility, skilled   Discharge Equipment Recommendations: none   Barriers to discharge: Decreased caregiver support (at current functional level)    Assessment:     Lane Nunez Jr. is a 67 y.o. 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  He presents with the following impairments/functional limitations:  weakness, impaired functional mobilty, impaired endurance, impaired self care skills, gait instability, impaired balance, decreased safety awareness. Initiated gait training this session, but pt reported feeling "funny" and ultimately requested return to supine. Pt with difficulty describing symptoms but stated he felt like he was having trouble catching his breath, thus gait training terminated. Pt would continue to benefit from skilled acute PT in order to address current deficits and progress functional mobility.     Rehab Prognosis:  good; patient would benefit from acute skilled PT services to address these deficits and reach maximum level of function.      Recent Surgery: * No surgery found *      Plan:     During this hospitalization, patient to be seen 3 x/week to address the above listed problems via gait training, therapeutic activities, therapeutic exercises, wheelchair management/training  · Plan of Care Expires:  02/11/18   Plan of Care Reviewed with: patient, spouse    Subjective     Communicated with RN prior to session.  Patient found supine upon PT entry to room, agreeable to treatment.      Chief Complaint: feeling "funny" and feeling "like I can't catch my breath"  Patient comments/goals: Pt denied dizziness, lightheadedness, and nausea; unable to describe symptoms thoroughly.   Pain/Comfort:  · Pain Rating 1: 0/10    Patients cultural, spiritual, Zoroastrianism conflicts given the " "current situation: none noted     Objective:     Patient found with: telemetry, peripheral IV     General Precautions: Standard, fall, aspiration   Orthopedic Precautions:N/A   Braces: N/A     Functional Mobility:  · Bed Mobility:     · Supine to Sit: stand by assistance with HOB elevated  · Sit to Supine: minimum assistance (managing LEs)  · Transfers:     · Sit to Stand:  contact guard assistance with rolling walker and x2 reps  · Bed to Chair: contact guard assistance with  rolling walker  using  Stand Pivot  · Gait: 10 ft with RW and CGA. Pt then suddenly stopped forward progression and reported feeling "funny." W/c brought behind pt for seated rest. Once in sitting, pt reported feeling like he was having difficulty catching his breath. Thus, gait terminated. Pt pushed in w/c to bedside. Transferred to sit EOB. Pt then requesting return to supine.         AM-PAC 6 CLICK MOBILITY  Turning over in bed (including adjusting bedclothes, sheets and blankets)?: 3  Sitting down on and standing up from a chair with arms (e.g., wheelchair, bedside commode, etc.): 3  Moving from lying on back to sitting on the side of the bed?: 3  Moving to and from a bed to a chair (including a wheelchair)?: 3  Need to walk in hospital room?: 2  Climbing 3-5 steps with a railing?: 1  Total Score: 15       Therapeutic Activities and Exercises:  RN notified that pt's telemetry not reading on monitor. RN to bedside to fix telemetry prior to ambulation in order to monitor HR during mobility.   Voided in urinal while seated EOB with pt's wife providing assist.   Began gait training, but pt reported feeling "funny" and required return to sit in w/c. Pt then pushing in w/c to bedside. Performed sit>stand and stand pivot with CGA and RW. Sat EOB to attempt LE therex, but pt then requesting return to supine. RN notified following session.      Patient left supine with all lines intact, call button in reach, RN notified and pt's wife " present..    GOALS:    Physical Therapy Goals        Problem: Physical Therapy Goal    Goal Priority Disciplines Outcome Goal Variances Interventions   Physical Therapy Goal     PT/OT, PT Ongoing (interventions implemented as appropriate)     Description:  Goals to be met by: 18     Patient will increase functional independence with mobility by performin. Supine to sit with Stand-by Assistance - met   2. Sit to stand transfer with Minimal Assistance - met   2a. Sit to stand transfer with Supervision   3. Bed to chair transfer with Minimal Assistance using appropriate AD or without AD. - met  4. Gait  x 3 feet with Minimal Assistance using appropriate AD or without AD. - met  4a. Gait x50 ft with SBA using appropriate AD or without AD  5. Wheelchair propulsion x20 feet with Stand-by Assistance using BUE and/or BLE.  6. Lower extremity exercise program x15 reps, with assistance as needed, in order to increase LE strength and (I) with functional mobility.                         Time Tracking:     PT Received On: 18  PT Start Time: 1032     PT Stop Time: 1059  PT Total Time (min): 27 min     Billable Minutes: Therapeutic Activity 27    Treatment Type: Treatment  PT/PTA: PT     PTA Visit Number: 0     Lesly Tinoco, PT, DPT   2018  [phone]

## 2018-01-19 NOTE — PLAN OF CARE
Problem: Patient Care Overview  Goal: Plan of Care Review  Outcome: Ongoing (interventions implemented as appropriate)  Pt gets up with assistance and remained free from falls and injury. Pt had complaints of chest pains today; EKG done and troponin levels drawn. Pt anticipating to D/C to long term care facility next week. Bed in lowest position, nonskid footwear on pt when out of bed, call light and possessions within reach, side rails up x2. Pt voices understanding to call for assistance. Will continue to monitor.

## 2018-01-19 NOTE — PROGRESS NOTES
[facility]  Hematology  Bone Marrow Transplant  Progress Note    Patient Name: Lane Nunez Jr.  Admission Date: 1/12/2018  Hospital Length of Stay: 7 days  Code Status: DNR    Subjective:     Interval History: No new symptoms overnight.  Denies fevers, chills, chest pain, cough, shortness of breath, nausea, vomiting, abdominal pain, or diarrhea.  Had an episode of chest discomfort when working with physical therapy.  Right sided chest pain that improved with rest.  Still has it and not worsened with inspiration or with palpation.    Objective:     Vital Signs (Most Recent):  Temp: 99.7 °F (37.6 °C) (01/19/18 0532)  Pulse: (!) 115 (01/19/18 0832)  Resp: 18 (01/19/18 0832)  BP: 109/63 (01/19/18 0832)  SpO2: (!) 93 % (01/19/18 0832) Vital Signs (24h Range):  Temp:  [98.5 °F (36.9 °C)-99.7 °F (37.6 °C)] 99.7 °F (37.6 °C)  Pulse:  [105-124] 115  Resp:  [16-20] 18  SpO2:  [93 %-98 %] 93 %  BP: (101-141)/(60-74) 109/63     Weight: 97.7 kg (215 lb 4.5 oz)  Body mass index is 28.4 kg/m².  Body surface area is 2.24 meters squared.    ECOG SCORE         [unfilled]    Intake/Output - Last 3 Shifts       01/17 0700 - 01/18 0659 01/18 0700 - 01/19 0659 01/19 0700 - 01/20 0659    P.O. 1080 240     I.V. (mL/kg) 245 (2.5)      IV Piggyback 350 100     Total Intake(mL/kg) 1675 (16.8) 340 (3.5)     Urine (mL/kg/hr) 1475 (0.6) 1850 (0.8)     Stool 0 (0) 0 (0)     Total Output 1475 1850      Net +200 -1510             Stool Occurrence 1 x 1 x           Physical Exam   Constitutional: He appears well-developed.   HENT:   Head: Normocephalic.   Eyes: EOM are normal. Pupils are equal, round, and reactive to light.   Neck: Normal range of motion. No tracheal deviation present.   Cardiovascular: Regular rhythm and normal heart sounds.  Exam reveals no gallop and no friction rub.    No murmur heard.  Tachycardic  Right chest wall nontender to palpation.  No new overlying skin changes   Pulmonary/Chest: Effort normal and  breath sounds normal. No respiratory distress. He has no wheezes. He has no rales.   Abdominal: Soft. Bowel sounds are normal. He exhibits no distension and no mass. There is no tenderness. There is no guarding.   Musculoskeletal: He exhibits no edema.   Neurological: He is alert.   Slight left sided droop (baseline per wife) and right pupil slightly larger than left.  No other focal deficits.   Skin: Skin is warm and dry.   Multiple areas of erythematous papular and vesicular lesions throughout body  No open bleeding lesions.         Significant Labs:   CBC:   Recent Labs  Lab 01/18/18  0540 01/19/18  0530   WBC 4.06 4.43   HGB 7.5* 7.2*   HCT 23.5* 21.9*   PLT 29* 24*   , CMP:   Recent Labs  Lab 01/18/18  0540 01/19/18  0530    138   K 3.8 4.0    104   CO2 25 26    98   BUN 12 12   CREATININE 0.9 0.9   CALCIUM 9.1 9.0   PROT 6.0 5.9*   ALBUMIN 1.9* 1.9*   BILITOT 0.4 0.4   ALKPHOS 153* 143*   AST 15 14   ALT 16 14   ANIONGAP 11 8   EGFRNONAA >60.0 >60.0    and Coagulation: No results for input(s): PT, INR, APTT in the last 48 hours.    Diagnostic Results:  CT PE 1/18/18 negative for pulmonary embolism.    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 BM Bx negative. Initially planned for treatment with brentuximab.  Stage II ALCL (chest lymphadenopathy only)  -skin bx on 12/28/17- SKIN, RIGHT UPPER BACK, EXCISIONAL BIOPSY: CD30-positive, ALK negative T-cell lymphoproliferative disorder  -Port study today.  From CXR, appears to be in brachiocephalic veins.  Will not need central access for brentuximab.  -Holding prednisone for concern it may have caused strain on the heart.  Held off on brentuximab due to tachycardia since 1/13/18.  Reviewed old progress notes and he had been tachycardic on previous admission in 110s.  -Viral workup HIV and hepatitis negative. EBV and CMV negative.  -daily TLS labs  --LDH downtrend  1353 to 1235, uric acid downtrend 5.3 to 4.8  -Reconsidering treatment with brentuximab while inpatient.  -Has port that is located in brachiocephalic v.  Port study 1/15/18 identified fibrin sheath at catheter tip  --Can consider outpatient removal of the port        Tachycardia    Unclear etiology of tachycardia but leading ddx with fever 1/14/18 is UTI. Qtc wnl.  Sinus tachycardia since admission.  Denies chest pain or SOB but feels weak.  Denies dizziness at this time. Slight improvement from 140s to 130s after IVF. Otherwise not compromising BP at this time.  -Ddx includes drug related  --prednisone 75mg qday held  --other drugs : galantamine (causes bradycardia, heart block; ?possible compensatory response).  Memantine (hyper- or hypotension).  Olanzapine (conduction abnormalities), venlafaxine (tachycardia, hypotension)  ---consider consult neuro for de-escalation  -Ruled out ACS with trop x 3 negative.   -slight improvement with hydration and review of vitals show lowest HR when sleeping  -continue with monitoring  -With improved HR post IV lopressor trial 1/16/18, will continue home coreg 6.25mg BID        Chest pain    1/19/18 reported chest pain while working with physical therapy.  Resolved after rest.  Ddx includes stable angina but atypical location.  Most likely muscular strain from being deconditioned.  EKG without changes concerning for ACS.  -troponin #1 wnl.  Will trend q6h for a total of 3.        Thrombocytopenia    Thrombocytopenia initially thought to be due to Revlimid on previous admission but he has not had it for several weeks now.  Concern for ITP on previous admission for which he was empirically started on prednisone 100mg qday without response.  Thrombocytopenia had been slowly worsening over the past year and particularly has been lower since 11/2017.  -Stopped prednisone 75mg qday due to tachycardia and potential heart strain with known history of systolic dysfunction.  -monitoring  platelets daily; has not required plt transfusion.  Epistaxis 1/16/18 self-limited.  Will hold on transfusion.        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  HIT panels from 6/2014 show two OD readings of 1.034 and 1.212.          Constipation    Resolved. Wife reported that he hadn't had a bowel movement since admission.  May haved contributed to urinary retention. BM 1/16/18.  -continue senna/colace and increase miralax to TID PRN.        Fever    Febrile 1/14/18.  With urinary retention, suspect UTI as leading source.  Differentials include oral with dental caries seen on physical exam.  He denies URI symptoms and cxr without infiltrate.  -1/12 and 1/14 blood cx ngtd.  Urine cx NGTD.  RVP pending.  -continue with abx Day 6 .  Will dc vancomycin and cefepime.  Switch to doxycycline 100mg BID.        Glaucoma    -continue home eye drops         Neuropathy    -continue home gabapentin        Dysphagia    - possibly secondary to dementia  - He has refused a G-tube on multiple occasions in the past per chart  - During last admission, patient had regular diet with nectar thick liquids and aspiration precautions          Multiple myeloma in remission    See oncologic hx on H&P for more information  - had silva auto 6/25/14   - was in CR at 1 year  - maintenance Revlimid on hold for 4 weeks due to thrombocytopenia. Will continue to hold at this time  - now with BLE weakness, inability to walk--Will likely need PT/OT after therapy  - recent SPEP with normal protein, FLC from 12/28 with kappa light chain of 4.3, ratio of 1.40   - bone marrow biopsy 12/28/17 : NO MORPHOLOGIC OR IMMUNOPHENOTYPIC EVIDENCE OF RESIDUAL PLASMA CELL NEOPLASM.  -- NO MORPHOLOGIC OR IMMUNOPHENOTYPIC EVIDENCE OF MARROW INVOLVEMENT BY  CD30-POSITIVE T-CELL LYMPHOPROLIFERATIVE DISORDER        Cytopenia    Anemia and thrombocytopenia likely 2/2 disease  - Revlimid has been on hold due to thrombocytopenia  - Transfuse for hgb <7 g/dL, plt <10  k/uL   - dc'ed ASA 81mg qday with plt <50k        Cardiomyopathy    -hypotensive on admission, holding lasix 20 mg daily.  Currently autodiuresing.  - hx of decreased EF 30-40%    --may consider repeat echo given persistent tachycardia        Hypertension    -hypotensive on admission initially held home coreg and lisinopril  --continue coreg 6.25 mg bidfor both Htn and tachycardia  -BP adequately controlled without medications at this time.        History of peripheral stem cell transplant    -had silva auto SCT 6/25/14 for myeloma, Day 1304  - 1 year restaging marrow on 7/24/15 showed 40% cellularity with trilineage hematopoiesis and no morphologic evidence of plasma cells.  Cytogenetics could not be done as the metaphase cells did not grow. SPEP/ELENO 12/28/17 without monoclonal peaks. FLC from 12/28 with kappa light chain of 4.3, ratio of 1.40   - PET CT did not reveal any evidence of myeloma at 1 year   - skeletal survey 12/28/17 was negative  - maintenance Revlimid on hold         Debility    -pt complaining of worsening orthostatic symptoms, decreased PO intake, and weakness over the past week  -maintenance fluids 100 cc/hr x10 hrs  -boost  -PT/OT consulted, recommend SNF.  [facility] declined. Searching for alternatives.        BPH (benign prostatic hyperplasia)    BPH and was straight cath'd 500cc 1/14/18.  Wife also reports constipation.  Urinary retention either not well controlled with home finasteride or exacerbated by constipation.  -continue home finasteride     --consider addition of tamsulosin if requiring persistent straight caths  -bladder scan qshift        Memory impairment    - pt with dementia  - continue home memantine and galantamine         Depression    -Continue home amitriptyline, olanzapine, and venlafaxine            VTE Risk Mitigation         Ordered     Medium Risk of VTE  Once      01/12/18 1740     Place sequential compression device  Until discontinued      01/12/18 1740           Disposition: pending [facility]    Russel Martin MD  Bone Marrow Transplant  [facility]

## 2018-01-19 NOTE — ASSESSMENT & PLAN NOTE
-had silva auto SCT 6/25/14 for myeloma, Day 1304  - 1 year restaging marrow on 7/24/15 showed 40% cellularity with trilineage hematopoiesis and no morphologic evidence of plasma cells.  Cytogenetics could not be done as the metaphase cells did not grow. SPEP/ELENO 12/28/17 without monoclonal peaks. FLC from 12/28 with kappa light chain of 4.3, ratio of 1.40   - PET CT did not reveal any evidence of myeloma at 1 year   - skeletal survey 12/28/17 was negative  - maintenance Revlimid on hold

## 2018-01-19 NOTE — PROGRESS NOTES
[facility] [place] and [facility] in [place] confirmed they received referral, SW awaitig return call with clinical decision from SNF.

## 2018-01-19 NOTE — PLAN OF CARE
Problem: Patient Care Overview  Goal: Plan of Care Review  Outcome: Ongoing (interventions implemented as appropriate)  Pt is resting in bed, denies complaints of pain or discomfort. VS stable, pt is afebrile. Pt's wife visits at the bedside. Pt takes medications (whole pills) well with applesauce. Pt's personal items and call bell placed within easy reach. Bed is locked, in lowest position, with side rails up x 2. Non-skid socks in place. Pt encouraged to call for assistance as needed. Will continue to monitor.

## 2018-01-19 NOTE — SUBJECTIVE & OBJECTIVE
Subjective:     Interval History: Denies fevers, chills, cough, dysuria, abdominal pain, diarrhea, or constipation.    Objective:     Vital Signs (Most Recent):  Temp: 98.8 °F (37.1 °C) (01/18/18 1631)  Pulse: (!) 122 (01/18/18 1631)  Resp: 16 (01/18/18 1631)  BP: 101/66 (01/18/18 1631)  SpO2: 98 % (01/18/18 1631) Vital Signs (24h Range):  Temp:  [98.5 °F (36.9 °C)-99.4 °F (37.4 °C)] 98.8 °F (37.1 °C)  Pulse:  [100-135] 122  Resp:  [16-17] 16  SpO2:  [95 %-98 %] 98 %  BP: (101-132)/(66-82) 101/66     Weight: 99.7 kg (219 lb 14.5 oz)  Body mass index is 29.01 kg/m².  Body surface area is 2.27 meters squared.    ECOG SCORE         [unfilled]    Intake/Output - Last 3 Shifts       01/16 0700 - 01/17 0659 01/17 0700 - 01/18 0659 01/18 0700 - 01/19 0659    P.O. 720 1080 240    I.V. (mL/kg) 582 (5.8) 245 (2.5)     IV Piggyback 1150 350 100    Total Intake(mL/kg) 2452 (24.6) 1675 (16.8) 340 (3.4)    Urine (mL/kg/hr) 3725 (1.6) 1475 (0.6) 625 (0.5)    Stool 0 (0) 0 (0) 0 (0)    Total Output 3725 1475 625    Net -1273 +200 -285           Stool Occurrence 1 x 1 x 1 x          Physical Exam   Constitutional: He appears well-developed.   HENT:   Head: Normocephalic.   Eyes: EOM are normal. Pupils are equal, round, and reactive to light.   Neck: Normal range of motion. No tracheal deviation present.   Cardiovascular: Regular rhythm and normal heart sounds.  Exam reveals no gallop and no friction rub.    No murmur heard.  tachycardic   Pulmonary/Chest: Effort normal and breath sounds normal. No respiratory distress. He has no wheezes. He has no rales.   Abdominal: Soft. Bowel sounds are normal. He exhibits no distension and no mass. There is no tenderness. There is no guarding.   Musculoskeletal: He exhibits no edema.   Neurological: He is alert.   Slight left sided droop (baseline per wife) and right pupil slightly larger than left.  No other focal deficits.   Skin: Skin is warm and dry.   Multiple areas of erythematous papular  and vesicular lesions throughout body  Previously identified open lesion on mid back is no longer bleeding.  Pillow case without blood.       Significant Labs:   CBC:   Recent Labs  Lab 01/17/18  0559 01/18/18  0540   WBC 5.30 4.06   HGB 7.5* 7.5*   HCT 23.1* 23.5*   PLT 25* 29*   , CMP:   Recent Labs  Lab 01/17/18  0559 01/18/18  0540   * 138   K 4.1 3.8    102   CO2 25 25    101   BUN 11 12   CREATININE 0.8 0.9   CALCIUM 8.7 9.1   PROT 5.9* 6.0   ALBUMIN 1.8* 1.9*   BILITOT 0.6 0.4   ALKPHOS 135 153*   AST 21 15   ALT 14 16   ANIONGAP 8 11   EGFRNONAA >60.0 >60.0    and Coagulation: No results for input(s): PT, INR, APTT in the last 48 hours.    Diagnostic Results:  I have reviewed all pertinent imaging results/findings within the past 24 hours.   CT Abd/Pelvis 1/18/18  Findings:    There is a partially visualized 1.2 cm nodular opacity at the right lung base. This is not definitively seen on prior CT examination and may be infectious or inflammatory in etiology. There are a few additional scattered micronodules at the right lung base. There is no significant pleural effusion.  The visualized portions of the heart appear normal.    The liver is mildly enlarged. There is no focal hepatic abnormality.  The gallbladder is surgically absent.  There is no intra-or extrahepatic biliary ductal dilatation.    The stomach, spleen, pancreas, and adrenal glands are unremarkable.    The kidneys are normal in size and location and concentrate and excrete contrast properly on delayed imaging.  There is no evidence of hydronephrosis.  There are bilateral low attenuation renal lesions suggestive of cysts, stable from prior exam. The urinary bladder is unremarkable. There is no significant free fluid within the pelvis.    The abdominal aorta is normal in course and caliber with atherosclerotic change along its course.    The visualized loops of small and large bowel show no evidence of obstruction or  inflammation.  There is no ascites or free intraperitoneal air. There are scattered bilateral inguinal lymph nodes again noted, similar to prior examination. There is a mildly enlarged 1.1 cm right iliac chain lymph node which appears increased in size from prior examination. No definite additional new bulky lymphadenopathy is appreciated.    The osseous structures again demonstrate extensive lytic lesions throughout the visualized axial and appendicular skeleton in this patient with reported history of multiple myeloma. There are postsurgical changes related to prior vertebroplasty of the L5 vertebral body. There is no definite new pathologic fracture.  There are multiple subcutaneous nodules identified within the upper left abdominal wall, similar to prior examination. Additionally, there are multiple more superficial cutaneous nodules overlying the lower abdominal wall and pelvis. These appear mildly increased in size compared to prior examination. The previously identified superficial nodule now measures 2.3 cm in transverse diameter (axial series 1, image 73; previously 1.7 cm).    CTPE 1/18/18  Findings:    There is a left chest port wall with his intravenous catheter terminating in left innominate vein.    There is a left-sided aortic arch with 3 branch vessels.  The thoracic aorta maintains normal caliber, contour, and course without significant atherosclerotic calcification.  There is no evidence of aneurysmal dilation or dissection.    The heart is not enlarged and there is no evidence of pericardial effusion.    There are multiple enlarged lymph nodes throughout the mediastinum, hilum and bilateral axillary regions, all worse since prior exam.  There is now left hilar lymphadenopathy which was not appreciated on prior CT on 12/29/2017.      Limited images of the upper abdomen obtained during the course of this dedicated thoracic CT demonstrate right renal hypodensity, likely cyst.    The osseous  structures demonstrates multiple lytic lesions throughout axial and appendicular skeleton, similar to prior exams.    The trachea and proximal airways are patent.    There are bilateral apical bulla.  There are pulmonary nodules in the right lung, new since 12/2017, largest measuring 1.2 cm in right lower lobe.  There is irregular soft tissue thickening along the azygous fissure which has slowly progressed when compared to multiple prior exams, now with irregular soft tissue opacities along the right pleura.  This is nonspecific and could represent pleural extension of disease.    The pulmonary arteries distribute normally without evidence of filling defect to indicate pulmonary thromboembolism.  There are four pulmonary veins.  Systemic and pulmonary venoatrial connections are concordant.

## 2018-01-19 NOTE — ASSESSMENT & PLAN NOTE
Febrile 1/14/18.  With urinary retention, suspect UTI as leading source.  Differentials include oral with dental caries seen on physical exam.  He denies URI symptoms and cxr without infiltrate.  -1/12 and 1/14 blood cx ngtd.  Urine cx NGTD.  RVP pending.  -continue with abx Day 6 .  Will dc vancomycin and cefepime.  Switch to doxycycline 100mg BID.

## 2018-01-19 NOTE — ASSESSMENT & PLAN NOTE
-hypotensive on admission initially held home coreg and lisinopril  --continue coreg 6.25 mg bidfor both Htn and tachycardia  -BP adequately controlled without medications at this time.

## 2018-01-19 NOTE — PLAN OF CARE
Problem: Physical Therapy Goal  Goal: Physical Therapy Goal  Goals to be met by: 18     Patient will increase functional independence with mobility by performin. Supine to sit with Stand-by Assistance - met   2. Sit to stand transfer with Minimal Assistance - met   2a. Sit to stand transfer with Supervision   3. Bed to chair transfer with Minimal Assistance using appropriate AD or without AD. - met  4. Gait  x 3 feet with Minimal Assistance using appropriate AD or without AD. - met  4a. Gait x50 ft with SBA using appropriate AD or without AD  5. Wheelchair propulsion x20 feet with Stand-by Assistance using BUE and/or BLE.  6. Lower extremity exercise program x15 reps, with assistance as needed, in order to increase LE strength and (I) with functional mobility.       Outcome: Ongoing (interventions implemented as appropriate)  Goals reviewed and remain appropriate. Pt progressing towards goals.    Lesly Tinoco, PT, DPT   2018  [phone]

## 2018-01-19 NOTE — ASSESSMENT & PLAN NOTE
Febrile 1/14/18.  With urinary retention, suspect UTI as leading source.  Differentials include oral with dental caries seen on physical exam.  He denies URI symptoms and cxr without infiltrate.  -1/12 and 1/14 blood cx ngtd.  Urine cx NGTD.  RVP pending.  -continue with abx Day 5 .  Will dc vancomycin and cefepime.  Start ciprofloxacin 500mg q12h.

## 2018-01-19 NOTE — ASSESSMENT & PLAN NOTE
Thrombocytopenia initially thought to be due to Revlimid on previous admission but he has not had it for several weeks now.  Concern for ITP on previous admission for which he was empirically started on prednisone 100mg qday without response.  Thrombocytopenia had been slowly worsening over the past year and particularly has been lower since 11/2017.  -Stopped prednisone 75mg qday due to tachycardia and potential heart strain with known history of systolic dysfunction.  -monitoring platelets daily; has not required plt transfusion.  Epistaxis 1/16/18 self-limited.  Will hold on transfusion.

## 2018-01-19 NOTE — PROGRESS NOTES
[facility]  Hematology  Bone Marrow Transplant  Progress Note    Patient Name: Lane Nunez Jr.  Admission Date: 1/12/2018  Hospital Length of Stay: 6 days  Code Status: DNR    Subjective:     Interval History: Denies fevers, chills, cough, dysuria, abdominal pain, diarrhea, or constipation.    Objective:     Vital Signs (Most Recent):  Temp: 98.8 °F (37.1 °C) (01/18/18 1631)  Pulse: (!) 122 (01/18/18 1631)  Resp: 16 (01/18/18 1631)  BP: 101/66 (01/18/18 1631)  SpO2: 98 % (01/18/18 1631) Vital Signs (24h Range):  Temp:  [98.5 °F (36.9 °C)-99.4 °F (37.4 °C)] 98.8 °F (37.1 °C)  Pulse:  [100-135] 122  Resp:  [16-17] 16  SpO2:  [95 %-98 %] 98 %  BP: (101-132)/(66-82) 101/66     Weight: 99.7 kg (219 lb 14.5 oz)  Body mass index is 29.01 kg/m².  Body surface area is 2.27 meters squared.    ECOG SCORE         [unfilled]    Intake/Output - Last 3 Shifts       01/16 0700 - 01/17 0659 01/17 0700 - 01/18 0659 01/18 0700 - 01/19 0659    P.O. 720 1080 240    I.V. (mL/kg) 582 (5.8) 245 (2.5)     IV Piggyback 1150 350 100    Total Intake(mL/kg) 2452 (24.6) 1675 (16.8) 340 (3.4)    Urine (mL/kg/hr) 3725 (1.6) 1475 (0.6) 625 (0.5)    Stool 0 (0) 0 (0) 0 (0)    Total Output 3725 1475 625    Net -1273 +200 -285           Stool Occurrence 1 x 1 x 1 x          Physical Exam   Constitutional: He appears well-developed.   HENT:   Head: Normocephalic.   Eyes: EOM are normal. Pupils are equal, round, and reactive to light.   Neck: Normal range of motion. No tracheal deviation present.   Cardiovascular: Regular rhythm and normal heart sounds.  Exam reveals no gallop and no friction rub.    No murmur heard.  tachycardic   Pulmonary/Chest: Effort normal and breath sounds normal. No respiratory distress. He has no wheezes. He has no rales.   Abdominal: Soft. Bowel sounds are normal. He exhibits no distension and no mass. There is no tenderness. There is no guarding.   Musculoskeletal: He exhibits no edema.   Neurological: He  is alert.   Slight left sided droop (baseline per wife) and right pupil slightly larger than left.  No other focal deficits.   Skin: Skin is warm and dry.   Multiple areas of erythematous papular and vesicular lesions throughout body  Previously identified open lesion on mid back is no longer bleeding.  Pillow case without blood.       Significant Labs:   CBC:   Recent Labs  Lab 01/17/18  0559 01/18/18  0540   WBC 5.30 4.06   HGB 7.5* 7.5*   HCT 23.1* 23.5*   PLT 25* 29*   , CMP:   Recent Labs  Lab 01/17/18  0559 01/18/18  0540   * 138   K 4.1 3.8    102   CO2 25 25    101   BUN 11 12   CREATININE 0.8 0.9   CALCIUM 8.7 9.1   PROT 5.9* 6.0   ALBUMIN 1.8* 1.9*   BILITOT 0.6 0.4   ALKPHOS 135 153*   AST 21 15   ALT 14 16   ANIONGAP 8 11   EGFRNONAA >60.0 >60.0    and Coagulation: No results for input(s): PT, INR, APTT in the last 48 hours.    Diagnostic Results:  I have reviewed all pertinent imaging results/findings within the past 24 hours.   CT Abd/Pelvis 1/18/18  Findings:    There is a partially visualized 1.2 cm nodular opacity at the right lung base. This is not definitively seen on prior CT examination and may be infectious or inflammatory in etiology. There are a few additional scattered micronodules at the right lung base. There is no significant pleural effusion.  The visualized portions of the heart appear normal.    The liver is mildly enlarged. There is no focal hepatic abnormality.  The gallbladder is surgically absent.  There is no intra-or extrahepatic biliary ductal dilatation.    The stomach, spleen, pancreas, and adrenal glands are unremarkable.    The kidneys are normal in size and location and concentrate and excrete contrast properly on delayed imaging.  There is no evidence of hydronephrosis.  There are bilateral low attenuation renal lesions suggestive of cysts, stable from prior exam. The urinary bladder is unremarkable. There is no significant free fluid within the  pelvis.    The abdominal aorta is normal in course and caliber with atherosclerotic change along its course.    The visualized loops of small and large bowel show no evidence of obstruction or inflammation.  There is no ascites or free intraperitoneal air. There are scattered bilateral inguinal lymph nodes again noted, similar to prior examination. There is a mildly enlarged 1.1 cm right iliac chain lymph node which appears increased in size from prior examination. No definite additional new bulky lymphadenopathy is appreciated.    The osseous structures again demonstrate extensive lytic lesions throughout the visualized axial and appendicular skeleton in this patient with reported history of multiple myeloma. There are postsurgical changes related to prior vertebroplasty of the L5 vertebral body. There is no definite new pathologic fracture.  There are multiple subcutaneous nodules identified within the upper left abdominal wall, similar to prior examination. Additionally, there are multiple more superficial cutaneous nodules overlying the lower abdominal wall and pelvis. These appear mildly increased in size compared to prior examination. The previously identified superficial nodule now measures 2.3 cm in transverse diameter (axial series 1, image 73; previously 1.7 cm).    CTPE 1/18/18  Findings:    There is a left chest port wall with his intravenous catheter terminating in left innominate vein.    There is a left-sided aortic arch with 3 branch vessels.  The thoracic aorta maintains normal caliber, contour, and course without significant atherosclerotic calcification.  There is no evidence of aneurysmal dilation or dissection.    The heart is not enlarged and there is no evidence of pericardial effusion.    There are multiple enlarged lymph nodes throughout the mediastinum, hilum and bilateral axillary regions, all worse since prior exam.  There is now left hilar lymphadenopathy which was not appreciated on  prior CT on 12/29/2017.      Limited images of the upper abdomen obtained during the course of this dedicated thoracic CT demonstrate right renal hypodensity, likely cyst.    The osseous structures demonstrates multiple lytic lesions throughout axial and appendicular skeleton, similar to prior exams.    The trachea and proximal airways are patent.    There are bilateral apical bulla.  There are pulmonary nodules in the right lung, new since 12/2017, largest measuring 1.2 cm in right lower lobe.  There is irregular soft tissue thickening along the azygous fissure which has slowly progressed when compared to multiple prior exams, now with irregular soft tissue opacities along the right pleura.  This is nonspecific and could represent pleural extension of disease.    The pulmonary arteries distribute normally without evidence of filling defect to indicate pulmonary thromboembolism.  There are four pulmonary veins.  Systemic and pulmonary venoatrial connections are concordant.      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 BM Bx negative. Initially planned for treatment with brentuximab.  Stage II ALCL (chest lymphadenopathy only)  -skin bx on 12/28/17- SKIN, RIGHT UPPER BACK, EXCISIONAL BIOPSY: CD30-positive, ALK negative T-cell lymphoproliferative disorder  -Port study today.  From CXR, appears to be in brachiocephalic veins.  Will not need central access for brentuximab.  -Holding prednisone for concern it may have caused strain on the heart.  Held off on brentuximab due to tachycardia since 1/13/18.  Reviewed old progress notes and he had been tachycardic on previous admission in 110s.  -Viral workup HIV and hepatitis negative. EBV and CMV negative.  -daily TLS labs  --LDH downtrend 1353 to 1235, uric acid downtrend 5.3 to 4.8  -Reconsidering treatment with brentuximab while inpatient.  -Has port that is located in brachiocephalic  v.  Port study 1/15/18 identified fibrin sheath at catheter tip  --Can consider outpatient removal of the port        Tachycardia    Unclear etiology of tachycardia but leading ddx with fever 1/14/18 is UTI. Qtc wnl.  Sinus tachycardia since admission.  Denies chest pain or SOB but feels weak.  Denies dizziness at this time. Slight improvement from 140s to 130s after IVF. Otherwise not compromising BP at this time.  -Ddx includes drug related  --prednisone 75mg qday held  --other drugs : galantamine (causes bradycardia, heart block; ?possible compensatory response).  Memantine (hyper- or hypotension).  Olanzapine (conduction abnormalities), venlafaxine (tachycardia, hypotension)  ---consider consult neuro for de-escalation  -Ruled out ACS with trop x 3 negative.   -slight improvement with hydration and review of vitals show lowest HR when sleeping  -continue with monitoring  -With improved HR post IV lopressor trial 1/16/18, will continue home coreg 6.25mg BID        Thrombocytopenia    Concern for ITP on previous admission for which he was empirically started on prednisone 100mg qday without response.  Thrombocytopenia had been slowly worsening over the past year and particularly has been lower since 11/2017.  -Stopped prednisone 75mg qday due to tachycardia and potential heart strain with known history of systolic dysfunction.  -monitoring platelets daily; has not required plt transfusion.  Epistaxis 1/16/18 self-limited.  Will hold on transfusion.        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  HIT panels from 6/2014 show two OD readings of 1.034 and 1.212.          Constipation    Resolved. Wife reported that he hadn't had a bowel movement since admission.  May haved contributed to urinary retention. BM 1/16/18.  -continue senna/colace and increase miralax to TID PRN.        Fever    Febrile 1/14/18.  With urinary retention, suspect UTI as leading source.  Differentials include oral with dental  caries seen on physical exam.  He denies URI symptoms and cxr without infiltrate.  -1/12 and 1/14 blood cx ngtd.  Urine cx NGTD.  RVP pending.  -continue with abx Day 5 .  Will dc vancomycin and cefepime.  Start ciprofloxacin 500mg q12h.        Glaucoma    -continue home eye drops         Neuropathy    -continue home gabapentin        Dysphagia    - possibly secondary to dementia  - He has refused a G-tube on multiple occasions in the past per chart  - During last admission, patient had regular diet with nectar thick liquids and aspiration precautions          Multiple myeloma in remission    See oncologic hx on H&P for more information  - had silva auto 6/25/14   - was in CR at 1 year  - maintenance Revlimid on hold for 4 weeks due to thrombocytopenia. Will continue to hold at this time  - now with BLE weakness, inability to walk--Will likely need PT/OT after therapy  - recent SPEP with normal protein, FLC from 12/28 with kappa light chain of 4.3, ratio of 1.40   - bone marrow biopsy 12/28/17 : NO MORPHOLOGIC OR IMMUNOPHENOTYPIC EVIDENCE OF RESIDUAL PLASMA CELL NEOPLASM.  -- NO MORPHOLOGIC OR IMMUNOPHENOTYPIC EVIDENCE OF MARROW INVOLVEMENT BY  CD30-POSITIVE T-CELL LYMPHOPROLIFERATIVE DISORDER        Cytopenia    Anemia and thrombocytopenia likely 2/2 disease  - Revlimid has been on hold due to thrombocytopenia  - Transfuse for hgb <7 g/dL, plt <10 k/uL   - dc'ed ASA 81mg qday with plt <50k        Cardiomyopathy    -hypotensive on admission, holding lasix 20 mg daily.  Currently autodiuresing.  - hx of decreased EF 30-40%    --may consider repeat echo given persistent tachycardia        Hypertension    -hypotensive on admission initially held home coreg and lisinopril  --continue coreg 6.25 mg bidfor both Htn and tachycardia  -BP adequately controlled without medications at this time.        History of peripheral stem cell transplant    -had silva auto SCT 6/25/14 for myeloma, Day 1303  - 1 year restaging marrow on  7/24/15 showed 40% cellularity with trilineage hematopoiesis and no morphologic evidence of plasma cells.  Cytogenetics could not be done as the metaphase cells did not grow. SPEP/ELENO 12/28/17 without monoclonal peaks. FLC from 12/28 with kappa light chain of 4.3, ratio of 1.40   - PET CT did not reveal any evidence of myeloma at 1 year   - skeletal survey 12/28/17 was negative  - maintenance Revlimid on hold         Debility    -pt complaining of worsening orthostatic symptoms, decreased PO intake, and weakness over the past week  -maintenance fluids 100 cc/hr x10 hrs  -boost  -PT/OT consulted, recommend SNF.  [facility] declined. Searching for alternatives.        BPH (benign prostatic hyperplasia)    BPH and was straight cath'd 500cc 1/14/18.  Wife also reports constipation.  Urinary retention either not well controlled with home finasteride or exacerbated by constipation.  -continue home finasteride     --consider addition of tamsulosin if requiring persistent straight caths  -bladder scan qshift        Memory impairment    - pt with dementia  - continue home memantine and galantamine         Depression    -Continue home amitriptyline, olanzapine, and venlafaxine            VTE Risk Mitigation         Ordered     Medium Risk of VTE  Once      01/12/18 1740     Place sequential compression device  Until discontinued      01/12/18 1740          Disposition: SNF    Russel Martin MD  Bone Marrow Transplant  [facility]

## 2018-01-19 NOTE — PT/OT/SLP PROGRESS
"Occupational Therapy      Patient Name:  Lane Nunez Jr.   MRN:  [id number]    Patient attempted to be seen in AM however pt working with PT on attempt. PT reported to OT that pt was experiencing chest pains and felt "funny" during therapy session. RN contacted medical team and MD to order EKG. OT unable to return in PM for 2nd attempt. Will follow-up at next scheduled OT session.    Marina Khan, OT  1/19/2018  "

## 2018-01-19 NOTE — SUBJECTIVE & OBJECTIVE
Subjective:     Interval History: No new symptoms overnight.  Denies fevers, chills, chest pain, cough, shortness of breath, nausea, vomiting, abdominal pain, or diarrhea.  Had an episode of chest discomfort when working with physical therapy.  Right sided chest pain that improved with rest.  Still has it and not worsened with inspiration or with palpation.    Objective:     Vital Signs (Most Recent):  Temp: 99.7 °F (37.6 °C) (01/19/18 0532)  Pulse: (!) 115 (01/19/18 0832)  Resp: 18 (01/19/18 0832)  BP: 109/63 (01/19/18 0832)  SpO2: (!) 93 % (01/19/18 0832) Vital Signs (24h Range):  Temp:  [98.5 °F (36.9 °C)-99.7 °F (37.6 °C)] 99.7 °F (37.6 °C)  Pulse:  [105-124] 115  Resp:  [16-20] 18  SpO2:  [93 %-98 %] 93 %  BP: (101-141)/(60-74) 109/63     Weight: 97.7 kg (215 lb 4.5 oz)  Body mass index is 28.4 kg/m².  Body surface area is 2.24 meters squared.    ECOG SCORE         [unfilled]    Intake/Output - Last 3 Shifts       01/17 0700 - 01/18 0659 01/18 0700 - 01/19 0659 01/19 0700 - 01/20 0659    P.O. 1080 240     I.V. (mL/kg) 245 (2.5)      IV Piggyback 350 100     Total Intake(mL/kg) 1675 (16.8) 340 (3.5)     Urine (mL/kg/hr) 1475 (0.6) 1850 (0.8)     Stool 0 (0) 0 (0)     Total Output 1475 1850      Net +200 -1510             Stool Occurrence 1 x 1 x           Physical Exam   Constitutional: He appears well-developed.   HENT:   Head: Normocephalic.   Eyes: EOM are normal. Pupils are equal, round, and reactive to light.   Neck: Normal range of motion. No tracheal deviation present.   Cardiovascular: Regular rhythm and normal heart sounds.  Exam reveals no gallop and no friction rub.    No murmur heard.  Tachycardic  Right chest wall nontender to palpation.  No new overlying skin changes   Pulmonary/Chest: Effort normal and breath sounds normal. No respiratory distress. He has no wheezes. He has no rales.   Abdominal: Soft. Bowel sounds are normal. He exhibits no distension and no mass. There is no tenderness. There is  no guarding.   Musculoskeletal: He exhibits no edema.   Neurological: He is alert.   Slight left sided droop (baseline per wife) and right pupil slightly larger than left.  No other focal deficits.   Skin: Skin is warm and dry.   Multiple areas of erythematous papular and vesicular lesions throughout body  No open bleeding lesions.         Significant Labs:   CBC:   Recent Labs  Lab 01/18/18  0540 01/19/18  0530   WBC 4.06 4.43   HGB 7.5* 7.2*   HCT 23.5* 21.9*   PLT 29* 24*   , CMP:   Recent Labs  Lab 01/18/18  0540 01/19/18  0530    138   K 3.8 4.0    104   CO2 25 26    98   BUN 12 12   CREATININE 0.9 0.9   CALCIUM 9.1 9.0   PROT 6.0 5.9*   ALBUMIN 1.9* 1.9*   BILITOT 0.4 0.4   ALKPHOS 153* 143*   AST 15 14   ALT 16 14   ANIONGAP 11 8   EGFRNONAA >60.0 >60.0    and Coagulation: No results for input(s): PT, INR, APTT in the last 48 hours.    Diagnostic Results:  CT PE 1/18/18 negative for pulmonary embolism.

## 2018-01-19 NOTE — ASSESSMENT & PLAN NOTE
-pt complaining of worsening orthostatic symptoms, decreased PO intake, and weakness over the past week  -maintenance fluids 100 cc/hr x10 hrs  -boost  -PT/OT consulted, recommend SNF.  [facility] declined. Searching for alternatives.

## 2018-01-19 NOTE — PROGRESS NOTES
Notified MD that pt complaining of feeling funny and having off and on pain to chest. Asked pt to describe more of how he is feeling and pt unable to explain. Pt stated it's not heaviness, he's not light-headed, it doesn't feel like heartburn.  Vitals were taken and and stable. Dr. Lee stated she would order and EKG and discuss with team. Will continue to monitor.

## 2018-01-19 NOTE — ASSESSMENT & PLAN NOTE
Unclear etiology of tachycardia but leading ddx with fever 1/14/18 is UTI. Qtc wnl.  Sinus tachycardia since admission.  Denies chest pain or SOB but feels weak.  Denies dizziness at this time. Slight improvement from 140s to 130s after IVF. Otherwise not compromising BP at this time.  -Ddx includes drug related  --prednisone 75mg qday held  --other drugs : galantamine (causes bradycardia, heart block; ?possible compensatory response).  Memantine (hyper- or hypotension).  Olanzapine (conduction abnormalities), venlafaxine (tachycardia, hypotension)  ---consider consult neuro for de-escalation  -Ruled out ACS with trop x 3 negative.   -slight improvement with hydration and review of vitals show lowest HR when sleeping  -continue with monitoring  -With improved HR post IV lopressor trial 1/16/18, will continue home coreg 6.25mg BID

## 2018-01-19 NOTE — ASSESSMENT & PLAN NOTE
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 BM Bx negative. Initially planned for treatment with brentuximab.  Stage II ALCL (chest lymphadenopathy only)  -skin bx on 12/28/17- SKIN, RIGHT UPPER BACK, EXCISIONAL BIOPSY: CD30-positive, ALK negative T-cell lymphoproliferative disorder  -Port study today.  From CXR, appears to be in brachiocephalic veins.  Will not need central access for brentuximab.  -Holding prednisone for concern it may have caused strain on the heart.  Held off on brentuximab due to tachycardia since 1/13/18.  Reviewed old progress notes and he had been tachycardic on previous admission in 110s.  -Viral workup HIV and hepatitis negative. EBV and CMV negative.  -daily TLS labs  --LDH downtrend 1353 to 1235, uric acid downtrend 5.3 to 4.8  -Reconsidering treatment with brentuximab while inpatient.  -Has port that is located in brachiocephalic v.  Port study 1/15/18 identified fibrin sheath at catheter tip  --Can consider outpatient removal of the port

## 2018-01-19 NOTE — ASSESSMENT & PLAN NOTE
Resolved. Wife reported that he hadn't had a bowel movement since admission.  May haved contributed to urinary retention. BM 1/16/18.  -continue senna/colace and increase miralax to TID PRN.

## 2018-01-19 NOTE — ASSESSMENT & PLAN NOTE
1/19/18 reported chest pain while working with physical therapy.  Resolved after rest.  Ddx includes stable angina but atypical location.  Most likely muscular strain from being deconditioned.  EKG without changes concerning for ACS.  -troponin #1 wnl.  Will trend q6h for a total of 3.

## 2018-01-20 PROBLEM — K59.00 CONSTIPATION: Status: RESOLVED | Noted: 2018-01-15 | Resolved: 2018-01-20

## 2018-01-20 PROBLEM — D75.829 THROMBOCYTOPENIA, HEPARIN-INDUCED (HIT): Status: RESOLVED | Noted: 2018-01-15 | Resolved: 2018-01-20

## 2018-01-20 PROBLEM — R07.9 CHEST PAIN: Status: RESOLVED | Noted: 2018-01-19 | Resolved: 2018-01-20

## 2018-01-20 LAB
ALBUMIN SERPL BCP-MCNC: 2.1 G/DL
ALP SERPL-CCNC: 319 U/L
ALT SERPL W/O P-5'-P-CCNC: 28 U/L
ANION GAP SERPL CALC-SCNC: 10 MMOL/L
AST SERPL-CCNC: 37 U/L
BASOPHILS # BLD AUTO: 0 K/UL
BASOPHILS NFR BLD: 0 %
BILIRUB SERPL-MCNC: 0.4 MG/DL
BILIRUB UR QL STRIP: NEGATIVE
BUN SERPL-MCNC: 13 MG/DL
CALCIUM SERPL-MCNC: 9.3 MG/DL
CHLORIDE SERPL-SCNC: 100 MMOL/L
CLARITY UR REFRACT.AUTO: CLEAR
CO2 SERPL-SCNC: 27 MMOL/L
COLOR UR AUTO: YELLOW
CREAT SERPL-MCNC: 0.9 MG/DL
DIFFERENTIAL METHOD: ABNORMAL
EOSINOPHIL # BLD AUTO: 0 K/UL
EOSINOPHIL NFR BLD: 0.5 %
ERYTHROCYTE [DISTWIDTH] IN BLOOD BY AUTOMATED COUNT: 18.1 %
EST. GFR  (AFRICAN AMERICAN): >60 ML/MIN/1.73 M^2
EST. GFR  (NON AFRICAN AMERICAN): >60 ML/MIN/1.73 M^2
GLUCOSE SERPL-MCNC: 102 MG/DL
GLUCOSE UR QL STRIP: NEGATIVE
HCT VFR BLD AUTO: 23 %
HGB BLD-MCNC: 7.3 G/DL
HGB UR QL STRIP: NEGATIVE
IMM GRANULOCYTES # BLD AUTO: 0.04 K/UL
IMM GRANULOCYTES NFR BLD AUTO: 0.6 %
KETONES UR QL STRIP: NEGATIVE
LEUKOCYTE ESTERASE UR QL STRIP: NEGATIVE
LYMPHOCYTES # BLD AUTO: 1.5 K/UL
LYMPHOCYTES NFR BLD: 22.9 %
MAGNESIUM SERPL-MCNC: 1.7 MG/DL
MCH RBC QN AUTO: 31.3 PG
MCHC RBC AUTO-ENTMCNC: 31.7 G/DL
MCV RBC AUTO: 99 FL
MONOCYTES # BLD AUTO: 0.8 K/UL
MONOCYTES NFR BLD: 13 %
NEUTROPHILS # BLD AUTO: 4 K/UL
NEUTROPHILS NFR BLD: 63 %
NITRITE UR QL STRIP: NEGATIVE
NRBC BLD-RTO: 1 /100 WBC
PH UR STRIP: 7 [PH] (ref 5–8)
PHOSPHATE SERPL-MCNC: 2.9 MG/DL
PLATELET # BLD AUTO: 27 K/UL
PMV BLD AUTO: ABNORMAL FL
POTASSIUM SERPL-SCNC: 4.1 MMOL/L
PROCALCITONIN SERPL IA-MCNC: 0.15 NG/ML
PROT SERPL-MCNC: 6.5 G/DL
PROT UR QL STRIP: NEGATIVE
RBC # BLD AUTO: 2.33 M/UL
SODIUM SERPL-SCNC: 137 MMOL/L
SP GR UR STRIP: 1.01 (ref 1–1.03)
URN SPEC COLLECT METH UR: NORMAL
UROBILINOGEN UR STRIP-ACNC: NEGATIVE EU/DL
WBC # BLD AUTO: 6.38 K/UL

## 2018-01-20 PROCEDURE — 25000003 PHARM REV CODE 250: Performed by: STUDENT IN AN ORGANIZED HEALTH CARE EDUCATION/TRAINING PROGRAM

## 2018-01-20 PROCEDURE — 36415 COLL VENOUS BLD VENIPUNCTURE: CPT

## 2018-01-20 PROCEDURE — 84145 PROCALCITONIN (PCT): CPT

## 2018-01-20 PROCEDURE — 84100 ASSAY OF PHOSPHORUS: CPT

## 2018-01-20 PROCEDURE — 87086 URINE CULTURE/COLONY COUNT: CPT

## 2018-01-20 PROCEDURE — 81003 URINALYSIS AUTO W/O SCOPE: CPT

## 2018-01-20 PROCEDURE — 25000003 PHARM REV CODE 250: Performed by: INTERNAL MEDICINE

## 2018-01-20 PROCEDURE — 63600175 PHARM REV CODE 636 W HCPCS: Performed by: INTERNAL MEDICINE

## 2018-01-20 PROCEDURE — 20600001 HC STEP DOWN PRIVATE ROOM

## 2018-01-20 PROCEDURE — 85025 COMPLETE CBC W/AUTO DIFF WBC: CPT

## 2018-01-20 PROCEDURE — 87040 BLOOD CULTURE FOR BACTERIA: CPT

## 2018-01-20 PROCEDURE — 83735 ASSAY OF MAGNESIUM: CPT

## 2018-01-20 PROCEDURE — 80053 COMPREHEN METABOLIC PANEL: CPT

## 2018-01-20 PROCEDURE — 99233 SBSQ HOSP IP/OBS HIGH 50: CPT | Mod: GC,,, | Performed by: INTERNAL MEDICINE

## 2018-01-20 RX ORDER — CEFEPIME HYDROCHLORIDE 2 G/50ML
2 INJECTION, SOLUTION INTRAVENOUS
Status: DISCONTINUED | OUTPATIENT
Start: 2018-01-20 | End: 2018-01-21

## 2018-01-20 RX ADMIN — MORPHINE SULFATE 15 MG: 15 TABLET ORAL at 09:01

## 2018-01-20 RX ADMIN — FAMOTIDINE 20 MG: 20 TABLET, FILM COATED ORAL at 08:01

## 2018-01-20 RX ADMIN — GALANTAMINE HYDROBROMIDE 16 MG: 4 TABLET, FILM COATED ORAL at 04:01

## 2018-01-20 RX ADMIN — GABAPENTIN 300 MG: 300 CAPSULE ORAL at 01:01

## 2018-01-20 RX ADMIN — MORPHINE SULFATE 15 MG: 15 TABLET ORAL at 05:01

## 2018-01-20 RX ADMIN — MAGNESIUM OXIDE TAB 400 MG (241.3 MG ELEMENTAL MG) 800 MG: 400 (241.3 MG) TAB at 10:01

## 2018-01-20 RX ADMIN — GABAPENTIN 300 MG: 300 CAPSULE ORAL at 09:01

## 2018-01-20 RX ADMIN — CARVEDILOL 6.25 MG: 6.25 TABLET, FILM COATED ORAL at 09:01

## 2018-01-20 RX ADMIN — VENLAFAXINE HYDROCHLORIDE 150 MG: 37.5 CAPSULE, EXTENDED RELEASE ORAL at 08:01

## 2018-01-20 RX ADMIN — FLUTICASONE FUROATE AND VILANTEROL TRIFENATATE 1 PUFF: 100; 25 POWDER RESPIRATORY (INHALATION) at 08:01

## 2018-01-20 RX ADMIN — GABAPENTIN 300 MG: 300 CAPSULE ORAL at 05:01

## 2018-01-20 RX ADMIN — CEFEPIME HYDROCHLORIDE 2 G: 2 INJECTION, SOLUTION INTRAVENOUS at 11:01

## 2018-01-20 RX ADMIN — OLANZAPINE 7.5 MG: 2.5 TABLET, FILM COATED ORAL at 09:01

## 2018-01-20 RX ADMIN — ACETAMINOPHEN 650 MG: 325 TABLET ORAL at 04:01

## 2018-01-20 RX ADMIN — FINASTERIDE 5 MG: 5 TABLET, FILM COATED ORAL at 01:01

## 2018-01-20 RX ADMIN — FAMOTIDINE 20 MG: 20 TABLET, FILM COATED ORAL at 09:01

## 2018-01-20 RX ADMIN — MEMANTINE 10 MG: 10 TABLET ORAL at 09:01

## 2018-01-20 RX ADMIN — MAGNESIUM OXIDE TAB 400 MG (241.3 MG ELEMENTAL MG) 800 MG: 400 (241.3 MG) TAB at 06:01

## 2018-01-20 RX ADMIN — DOXYCYCLINE HYCLATE 100 MG: 100 TABLET, COATED ORAL at 08:01

## 2018-01-20 RX ADMIN — STANDARDIZED SENNA CONCENTRATE AND DOCUSATE SODIUM 1 TABLET: 8.6; 5 TABLET, FILM COATED ORAL at 08:01

## 2018-01-20 RX ADMIN — MORPHINE SULFATE 15 MG: 15 TABLET ORAL at 01:01

## 2018-01-20 RX ADMIN — CEFEPIME HYDROCHLORIDE 2 G: 2 INJECTION, SOLUTION INTRAVENOUS at 05:01

## 2018-01-20 RX ADMIN — GALANTAMINE HYDROBROMIDE 16 MG: 4 TABLET, FILM COATED ORAL at 08:01

## 2018-01-20 RX ADMIN — MEMANTINE 10 MG: 10 TABLET ORAL at 08:01

## 2018-01-20 RX ADMIN — CARVEDILOL 6.25 MG: 6.25 TABLET, FILM COATED ORAL at 08:01

## 2018-01-20 NOTE — PROGRESS NOTES
[facility]  Hematology  Bone Marrow Transplant  Progress Note    Patient Name: Lane Nunez Jr.  Admission Date: 1/12/2018  Hospital Length of Stay: 8 days  Code Status: DNR    Subjective:     Interval History:   - no acute events overnight. Tmax over past 24 hours is 100.  - he has no complaints this morning. He denies shortness of breath, chest pain, nausea, vomiting, diarrhea, constipation.    Objective:     Vital Signs (Most Recent):  Temp: 100.1 °F (37.8 °C) (01/20/18 0832)  Pulse: (!) 122 (01/20/18 0832)  Resp: 16 (01/20/18 0832)  BP: 134/66 (01/20/18 0832)  SpO2: (!) 93 % (01/20/18 0832) Vital Signs (24h Range):  Temp:  [98 °F (36.7 °C)-100.1 °F (37.8 °C)] 100.1 °F (37.8 °C)  Pulse:  [105-122] 122  Resp:  [16-20] 16  SpO2:  [93 %-99 %] 93 %  BP: (121-151)/(64-73) 134/66     Weight: 97.7 kg (215 lb 4.5 oz)  Body mass index is 28.4 kg/m².  Body surface area is 2.24 meters squared.    Intake/Output - Last 3 Shifts       01/18 0700 - 01/19 0659 01/19 0700 - 01/20 0659 01/20 0700 - 01/21 0659    P.O. 240 500 50    I.V. (mL/kg)       IV Piggyback 100      Total Intake(mL/kg) 340 (3.5) 500 (5.1) 50 (0.5)    Urine (mL/kg/hr) 1850 (0.8) 1275 (0.5) 300 (0.7)    Stool 0 (0) 0 (0)     Total Output 1850 1275 300    Net -1510 -775 -250           Urine Occurrence  1 x     Stool Occurrence 1 x 1 x         Physical Exam   Constitutional: He appears well-developed.   HENT:   Head: Normocephalic.   Eyes: EOM are normal. Pupils are equal, round, and reactive to light.   Neck: Normal range of motion. No tracheal deviation present.   Cardiovascular: Regular rhythm and normal heart sounds.  Exam reveals no gallop and no friction rub.    No murmur heard.  Tachycardic  Right chest wall nontender to palpation.  No new overlying skin changes   Pulmonary/Chest: Effort normal and breath sounds normal. No respiratory distress. He has no wheezes. He has no rales.   Abdominal: Soft. Bowel sounds are normal. He exhibits  no distension and no mass. There is no tenderness. There is no guarding.   Musculoskeletal: He exhibits no edema.   Neurological: He is alert.   Slight left sided droop (baseline per wife) and right pupil slightly larger than left.  No other focal deficits.   Skin: Skin is warm and dry.   Multiple areas of erythematous papular and vesicular lesions throughout body  No open bleeding lesions.       Significant Labs:   Labs have been reviewed.    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 BM Bx negative. Initially planned for treatment with brentuximab.  Stage II ALCL (chest lymphadenopathy only)  -skin bx on 12/28/17- SKIN, RIGHT UPPER BACK, EXCISIONAL BIOPSY: CD30-positive, ALK negative T-cell lymphoproliferative disorder  - Viral workup HIV and hepatitis negative. EBV and CMV negative.  - will give brentuximab as an outpatient, possibly middle of next week.        History of peripheral stem cell transplant    -had silva auto SCT 6/25/14 for myeloma, day 1305  - 1 year restaging marrow on 7/24/15 showed 40% cellularity with trilineage hematopoiesis and no morphologic evidence of plasma cells.  Cytogenetics could not be done as the metaphase cells did not grow. SPEP/ELENO 12/28/17 without monoclonal peaks. FLC from 12/28 with kappa light chain of 4.3, ratio of 1.40   - PET CT did not reveal any evidence of myeloma at 1 year   - skeletal survey 12/28/17 was negative  - maintenance Revlimid on hold         Multiple myeloma in remission    See oncologic hx on H&P for more information  - had silva auto 6/25/14   - was in CR at 1 year  - maintenance Revlimid on hold for 4 weeks due to thrombocytopenia. Will continue to hold at this time  - now with BLE weakness, inability to walk--Will likely need PT/OT after therapy  - recent SPEP with normal protein, FLC from 12/28 with kappa light chain of 4.3, ratio of 1.40   - bone marrow biopsy 12/28/17 :  NO MORPHOLOGIC OR IMMUNOPHENOTYPIC EVIDENCE OF RESIDUAL PLASMA CELL NEOPLASM.  -- NO MORPHOLOGIC OR IMMUNOPHENOTYPIC EVIDENCE OF MARROW INVOLVEMENT BY  CD30-POSITIVE T-CELL LYMPHOPROLIFERATIVE DISORDER        Cytopenia    - Anemia and thrombocytopenia likely 2/2 disease  - Revlimid has been on hold due to thrombocytopenia  - Transfuse for hgb <7 g/dL, plt <10 k/uL   - hemoglobin is 7.3 g/dL, platelet count is 27 k/uL  - continue to monitor.        Dysphagia    - possibly secondary to dementia  - He has refused a G-tube on multiple occasions in the past per chart  - During last admission, patient had regular diet with nectar thick liquids and aspiration precautions        Cardiomyopathy    - hx of decreased EF 30-40%    - no signs of volume overload. Continue to monitor.        Memory impairment    - no acute changes.  - continue home memantine         Depression    - no acute issues. Continue home amitriptyline, olanzapine, and venlafaxine        Hypertension    - blood pressure is mildly elevated.  - continue carvedilol        BPH (benign prostatic hyperplasia)    - no acute issues. Continue home finasteride           Neuropathy    - no acute issues. Continue home gabapentin.        Glaucoma    - no acute issues. Continue home eye drops         Fever    - Febrile 1/14/18.    -1/12 and 1/14 blood cx ngtd.    - continue with abx Day 7 . Will stop doxycycline after today.         Tachycardia    Unclear etiology of tachycardia but leading ddx with fever 1/14/18 is UTI. Qtc wnl.  Sinus tachycardia since admission.   - consider consult neuro for de-escalation  - Ruled out ACS with trop x 3 negative.   - heart rate better in the 110s. Continue carvedilol        Debility    - continue PT/OT. Awaiting SNF/nursing home placement.            VTE Risk Mitigation         Ordered     Medium Risk of VTE  Once      01/12/18 1740     Place sequential compression device  Until discontinued      01/12/18 1740        Disposition:  continue supportive care. Awaiting placement at SNF/nursing home.    Costa Ram MD  Bone Marrow Transplant  [facility]

## 2018-01-20 NOTE — ASSESSMENT & PLAN NOTE
- Anemia and thrombocytopenia likely 2/2 disease  - Revlimid has been on hold due to thrombocytopenia  - Transfuse for hgb <7 g/dL, plt <10 k/uL   - hemoglobin is 7.3 g/dL, platelet count is 27 k/uL  - continue to monitor.

## 2018-01-20 NOTE — PROGRESS NOTES
01/20/18 1620   Vital Signs   Temp (!) 101.8 °F (38.8 °C)   Temp src Oral   Pulse (!) 125   Heart Rate Source Monitor   Resp 19   SpO2 95 %   Pulse Oximetry Type Intermittent   O2 Device (Oxygen Therapy) room air   /70   BP Location Left arm   BP Method Automatic   Patient Position Lying   MD notified about elevated temp. Pt pan cultured. Tylenol administered and Cefepime ordered. Will continue to monitor.

## 2018-01-20 NOTE — ASSESSMENT & PLAN NOTE
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 BM Bx negative. Initially planned for treatment with brentuximab.  Stage II ALCL (chest lymphadenopathy only)  -skin bx on 12/28/17- SKIN, RIGHT UPPER BACK, EXCISIONAL BIOPSY: CD30-positive, ALK negative T-cell lymphoproliferative disorder  - Viral workup HIV and hepatitis negative. EBV and CMV negative.  - will give brentuximab as an outpatient, possibly middle of next week.

## 2018-01-20 NOTE — PLAN OF CARE
Problem: Patient Care Overview  Goal: Plan of Care Review  Outcome: Ongoing (interventions implemented as appropriate)  POC reviewed with patient; understanding verbalized. Mag replacements this shift. Pt C/O pain and received PO Morphine X1. He is up with assistance. Remains on tele in ST. Voids concentrate in the urinal; no BM. Regular diet with nectar thick liquids; fair appetite. Swallows pills in applesauce.  Pt. with nonskid footwear on, bed in lowest position, and locked with bed rails up x2.  Pt. instructed to call prior to getting OOB. Family to remain at bedside.  Pt. has call light and personal items within reach. VSS and afebrile this shift. All questions and concerns address at this time. Will continue to monitor.

## 2018-01-20 NOTE — ASSESSMENT & PLAN NOTE
- Febrile 1/14/18.    -1/12 and 1/14 blood cx ngtd.    - continue with abx Day 7 . Will stop doxycycline after today.

## 2018-01-20 NOTE — SUBJECTIVE & OBJECTIVE
Subjective:     Interval History:   - no acute events overnight. Tmax over past 24 hours is 100.  - he has no complaints this morning. He denies shortness of breath, chest pain, nausea, vomiting, diarrhea, constipation.    Objective:     Vital Signs (Most Recent):  Temp: 100.1 °F (37.8 °C) (01/20/18 0832)  Pulse: (!) 122 (01/20/18 0832)  Resp: 16 (01/20/18 0832)  BP: 134/66 (01/20/18 0832)  SpO2: (!) 93 % (01/20/18 0832) Vital Signs (24h Range):  Temp:  [98 °F (36.7 °C)-100.1 °F (37.8 °C)] 100.1 °F (37.8 °C)  Pulse:  [105-122] 122  Resp:  [16-20] 16  SpO2:  [93 %-99 %] 93 %  BP: (121-151)/(64-73) 134/66     Weight: 97.7 kg (215 lb 4.5 oz)  Body mass index is 28.4 kg/m².  Body surface area is 2.24 meters squared.    Intake/Output - Last 3 Shifts       01/18 0700 - 01/19 0659 01/19 0700 - 01/20 0659 01/20 0700 - 01/21 0659    P.O. 240 500 50    I.V. (mL/kg)       IV Piggyback 100      Total Intake(mL/kg) 340 (3.5) 500 (5.1) 50 (0.5)    Urine (mL/kg/hr) 1850 (0.8) 1275 (0.5) 300 (0.7)    Stool 0 (0) 0 (0)     Total Output 1850 1275 300    Net -1510 -775 -250           Urine Occurrence  1 x     Stool Occurrence 1 x 1 x         Physical Exam   Constitutional: He appears well-developed.   HENT:   Head: Normocephalic.   Eyes: EOM are normal. Pupils are equal, round, and reactive to light.   Neck: Normal range of motion. No tracheal deviation present.   Cardiovascular: Regular rhythm and normal heart sounds.  Exam reveals no gallop and no friction rub.    No murmur heard.  Tachycardic  Right chest wall nontender to palpation.  No new overlying skin changes   Pulmonary/Chest: Effort normal and breath sounds normal. No respiratory distress. He has no wheezes. He has no rales.   Abdominal: Soft. Bowel sounds are normal. He exhibits no distension and no mass. There is no tenderness. There is no guarding.   Musculoskeletal: He exhibits no edema.   Neurological: He is alert.   Slight left sided droop (baseline per wife) and  right pupil slightly larger than left.  No other focal deficits.   Skin: Skin is warm and dry.   Multiple areas of erythematous papular and vesicular lesions throughout body  No open bleeding lesions.       Significant Labs:   Labs have been reviewed.

## 2018-01-20 NOTE — ASSESSMENT & PLAN NOTE
Unclear etiology of tachycardia but leading ddx with fever 1/14/18 is UTI. Qtc wnl.  Sinus tachycardia since admission.   - consider consult neuro for de-escalation  - Ruled out ACS with trop x 3 negative.   - heart rate better in the 110s. Continue carvedilol

## 2018-01-20 NOTE — ASSESSMENT & PLAN NOTE
H/o HIT.  HIT panels from 6/2014 show two OD readings of 1.034 and 1.212.  - no anticoagulation at this time due to thrombocytopenia. Continue to monitor.

## 2018-01-20 NOTE — ASSESSMENT & PLAN NOTE
-had silva auto SCT 6/25/14 for myeloma, day 1305  - 1 year restaging marrow on 7/24/15 showed 40% cellularity with trilineage hematopoiesis and no morphologic evidence of plasma cells.  Cytogenetics could not be done as the metaphase cells did not grow. SPEP/ELENO 12/28/17 without monoclonal peaks. FLC from 12/28 with kappa light chain of 4.3, ratio of 1.40   - PET CT did not reveal any evidence of myeloma at 1 year   - skeletal survey 12/28/17 was negative  - maintenance Revlimid on hold

## 2018-01-20 NOTE — PLAN OF CARE
Problem: Patient Care Overview  Goal: Plan of Care Review  Outcome: Ongoing (interventions implemented as appropriate)  Plan of care reviewed with the patient at the beginning of the shift. Pt has had no complaints overnight. He denies n/v/d. Pt with dysphagia on admission- has been on nectar thick liquids and regular diet. Tolerating pills in applesauce. Pain managed with PO Morphine. Pt complains of headache tonight. Tele monitoring continued. Sinus tach noted and unchanged. Fall precautions maintained. Pt remained free from falls and injury this shift. Bed locked in lowest position, side rails up x2, call light within reach. Instructed pt to call for assistance as needed. Pt verbalized understanding. Family at the bedside. Vitals stable. No acute issues overnight. Will continue to monitor.

## 2018-01-21 LAB
ALBUMIN SERPL BCP-MCNC: 2.1 G/DL
ALP SERPL-CCNC: 242 U/L
ALT SERPL W/O P-5'-P-CCNC: 19 U/L
ANION GAP SERPL CALC-SCNC: 11 MMOL/L
ANISOCYTOSIS BLD QL SMEAR: SLIGHT
AST SERPL-CCNC: 17 U/L
BACTERIA BLD CULT: NORMAL
BACTERIA BLD CULT: NORMAL
BACTERIA UR CULT: NO GROWTH
BASOPHILS # BLD AUTO: 0.01 K/UL
BASOPHILS NFR BLD: 0.2 %
BILIRUB SERPL-MCNC: 0.5 MG/DL
BUN SERPL-MCNC: 12 MG/DL
CALCIUM SERPL-MCNC: 9.4 MG/DL
CHLORIDE SERPL-SCNC: 100 MMOL/L
CO2 SERPL-SCNC: 26 MMOL/L
CREAT SERPL-MCNC: 0.9 MG/DL
DIFFERENTIAL METHOD: ABNORMAL
EOSINOPHIL # BLD AUTO: 0 K/UL
EOSINOPHIL NFR BLD: 0 %
ERYTHROCYTE [DISTWIDTH] IN BLOOD BY AUTOMATED COUNT: 18.3 %
EST. GFR  (AFRICAN AMERICAN): >60 ML/MIN/1.73 M^2
EST. GFR  (NON AFRICAN AMERICAN): >60 ML/MIN/1.73 M^2
GLUCOSE SERPL-MCNC: 123 MG/DL
HCT VFR BLD AUTO: 23.2 %
HGB BLD-MCNC: 7.4 G/DL
HYPOCHROMIA BLD QL SMEAR: ABNORMAL
IMM GRANULOCYTES # BLD AUTO: 0.02 K/UL
IMM GRANULOCYTES NFR BLD AUTO: 0.4 %
LACTATE SERPL-SCNC: 2.1 MMOL/L
LYMPHOCYTES # BLD AUTO: 1.1 K/UL
LYMPHOCYTES NFR BLD: 19.8 %
MAGNESIUM SERPL-MCNC: 1.7 MG/DL
MCH RBC QN AUTO: 30.8 PG
MCHC RBC AUTO-ENTMCNC: 31.9 G/DL
MCV RBC AUTO: 97 FL
MONOCYTES # BLD AUTO: 0.8 K/UL
MONOCYTES NFR BLD: 14.8 %
NEUTROPHILS # BLD AUTO: 3.5 K/UL
NEUTROPHILS NFR BLD: 64.8 %
NRBC BLD-RTO: 0 /100 WBC
OVALOCYTES BLD QL SMEAR: ABNORMAL
PHOSPHATE SERPL-MCNC: 3.3 MG/DL
PLATELET # BLD AUTO: 26 K/UL
PMV BLD AUTO: ABNORMAL FL
POIKILOCYTOSIS BLD QL SMEAR: SLIGHT
POLYCHROMASIA BLD QL SMEAR: ABNORMAL
POTASSIUM SERPL-SCNC: 3.6 MMOL/L
PROT SERPL-MCNC: 6.4 G/DL
RBC # BLD AUTO: 2.4 M/UL
SODIUM SERPL-SCNC: 137 MMOL/L
WBC # BLD AUTO: 5.41 K/UL

## 2018-01-21 PROCEDURE — 87632 RESP VIRUS 6-11 TARGETS: CPT

## 2018-01-21 PROCEDURE — 25000003 PHARM REV CODE 250: Performed by: INTERNAL MEDICINE

## 2018-01-21 PROCEDURE — 87040 BLOOD CULTURE FOR BACTERIA: CPT | Mod: 59

## 2018-01-21 PROCEDURE — 36415 COLL VENOUS BLD VENIPUNCTURE: CPT

## 2018-01-21 PROCEDURE — 83605 ASSAY OF LACTIC ACID: CPT

## 2018-01-21 PROCEDURE — 20600001 HC STEP DOWN PRIVATE ROOM

## 2018-01-21 PROCEDURE — 84100 ASSAY OF PHOSPHORUS: CPT

## 2018-01-21 PROCEDURE — 83735 ASSAY OF MAGNESIUM: CPT

## 2018-01-21 PROCEDURE — 80053 COMPREHEN METABOLIC PANEL: CPT

## 2018-01-21 PROCEDURE — 25000003 PHARM REV CODE 250: Performed by: STUDENT IN AN ORGANIZED HEALTH CARE EDUCATION/TRAINING PROGRAM

## 2018-01-21 PROCEDURE — 99233 SBSQ HOSP IP/OBS HIGH 50: CPT | Mod: GC,,, | Performed by: INTERNAL MEDICINE

## 2018-01-21 PROCEDURE — 85025 COMPLETE CBC W/AUTO DIFF WBC: CPT

## 2018-01-21 RX ORDER — CEFEPIME HYDROCHLORIDE 1 G/1
2 INJECTION, POWDER, FOR SOLUTION INTRAMUSCULAR; INTRAVENOUS
Status: DISCONTINUED | OUTPATIENT
Start: 2018-01-21 | End: 2018-01-21

## 2018-01-21 RX ORDER — AMOXICILLIN AND CLAVULANATE POTASSIUM 875; 125 MG/1; MG/1
1 TABLET, FILM COATED ORAL EVERY 12 HOURS
Status: DISCONTINUED | OUTPATIENT
Start: 2018-01-21 | End: 2018-01-22

## 2018-01-21 RX ADMIN — VENLAFAXINE HYDROCHLORIDE 150 MG: 37.5 CAPSULE, EXTENDED RELEASE ORAL at 08:01

## 2018-01-21 RX ADMIN — CARVEDILOL 6.25 MG: 6.25 TABLET, FILM COATED ORAL at 08:01

## 2018-01-21 RX ADMIN — SODIUM CHLORIDE 500 ML: 0.9 INJECTION, SOLUTION INTRAVENOUS at 09:01

## 2018-01-21 RX ADMIN — FINASTERIDE 5 MG: 5 TABLET, FILM COATED ORAL at 01:01

## 2018-01-21 RX ADMIN — GALANTAMINE HYDROBROMIDE 16 MG: 4 TABLET, FILM COATED ORAL at 07:01

## 2018-01-21 RX ADMIN — ACETAMINOPHEN 650 MG: 325 TABLET ORAL at 08:01

## 2018-01-21 RX ADMIN — MEMANTINE 10 MG: 10 TABLET ORAL at 08:01

## 2018-01-21 RX ADMIN — STANDARDIZED SENNA CONCENTRATE AND DOCUSATE SODIUM 1 TABLET: 8.6; 5 TABLET, FILM COATED ORAL at 08:01

## 2018-01-21 RX ADMIN — FAMOTIDINE 20 MG: 20 TABLET, FILM COATED ORAL at 08:01

## 2018-01-21 RX ADMIN — MORPHINE SULFATE 15 MG: 15 TABLET ORAL at 09:01

## 2018-01-21 RX ADMIN — MAGNESIUM OXIDE TAB 400 MG (241.3 MG ELEMENTAL MG) 800 MG: 400 (241.3 MG) TAB at 08:01

## 2018-01-21 RX ADMIN — GABAPENTIN 300 MG: 300 CAPSULE ORAL at 01:01

## 2018-01-21 RX ADMIN — MORPHINE SULFATE 15 MG: 15 TABLET ORAL at 04:01

## 2018-01-21 RX ADMIN — ACETAMINOPHEN 650 MG: 325 TABLET ORAL at 07:01

## 2018-01-21 RX ADMIN — OLANZAPINE 7.5 MG: 2.5 TABLET, FILM COATED ORAL at 08:01

## 2018-01-21 RX ADMIN — MAGNESIUM OXIDE TAB 400 MG (241.3 MG ELEMENTAL MG) 800 MG: 400 (241.3 MG) TAB at 01:01

## 2018-01-21 RX ADMIN — GABAPENTIN 300 MG: 300 CAPSULE ORAL at 06:01

## 2018-01-21 RX ADMIN — FLUTICASONE FUROATE AND VILANTEROL TRIFENATATE 1 PUFF: 100; 25 POWDER RESPIRATORY (INHALATION) at 08:01

## 2018-01-21 RX ADMIN — GALANTAMINE HYDROBROMIDE 16 MG: 4 TABLET, FILM COATED ORAL at 04:01

## 2018-01-21 RX ADMIN — POTASSIUM CHLORIDE 40 MEQ: 20 SOLUTION ORAL at 08:01

## 2018-01-21 RX ADMIN — MEMANTINE 10 MG: 10 TABLET ORAL at 09:01

## 2018-01-21 RX ADMIN — AMOXICILLIN AND CLAVULANATE POTASSIUM 1 TABLET: 875; 125 TABLET, FILM COATED ORAL at 09:01

## 2018-01-21 RX ADMIN — GABAPENTIN 300 MG: 300 CAPSULE ORAL at 08:01

## 2018-01-21 NOTE — ASSESSMENT & PLAN NOTE
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 BM Bx negative. Initially planned for treatment with brentuximab.  Stage II ALCL (chest lymphadenopathy only)  -skin bx on 12/28/17- SKIN, RIGHT UPPER BACK, EXCISIONAL BIOPSY: CD30-positive, ALK negative T-cell lymphoproliferative disorder  - Viral workup HIV and hepatitis negative. EBV and CMV negative.  - will give brentuximab as an outpatient, possibly middle of this upcoming week.

## 2018-01-21 NOTE — ASSESSMENT & PLAN NOTE
- patient had febrile episodes over the past 24 hours. Unclear if the fever is related to an infection or possibly to the lymphoma.  - received cefepime yesterday. I have changed to augmentin today.   - follow up cultures drawn on 1/20/18.

## 2018-01-21 NOTE — PLAN OF CARE
Problem: Patient Care Overview  Goal: Plan of Care Review  Outcome: Ongoing (interventions implemented as appropriate)  Plan of care reviewed with the patient at the beginning of the shift. Pt has had no complaints overnight. He denies n/v/d. Pt with dysphagia on admission- has been on nectar thick liquids and regular diet. Tolerating pills in applesauce. Pain managed with PO Morphine. Pt complains of headache tonight. Tele monitoring continued. Sinus tach noted and unchanged. Pt has been afebrile so far this shift. Tmax was 101.8 yesterday and pt was pan cultured and started on Cefepime. Pt has multiple blisters that are mostly intact covering his entire body. Fall precautions maintained. Pt remained free from falls and injury this shift. Bed locked in lowest position, side rails up x2, call light within reach. Instructed pt to call for assistance as needed. Pt verbalized understanding. Family at the bedside. Vitals stable. No acute issues overnight. Will continue to monitor.

## 2018-01-21 NOTE — PLAN OF CARE
Problem: Patient Care Overview  Goal: Plan of Care Review  Outcome: Ongoing (interventions implemented as appropriate)  POC reviewed with patient; understanding verbalized. TMAX 101. Blood cultures and nasal swab done. Dressing changed to left AC wound. Mag and Potassium replacements this shift. Pt C/O pain and received PO Morphine x2. He is up with assistance. Remains on tele in ST. Voids concentrate in the urinal; one BM this shift. Regular diet with nectar thick liquids; fair appetite. Swallows pills in applesauce.  Pt. with nonskid footwear on, bed in lowest position, and locked with bed rails up x2.  Pt. instructed to call prior to getting OOB. Family to remain at bedside.  Pt. has call light and personal items within reach. All questions and concerns address at this time. Will continue to monitor.

## 2018-01-21 NOTE — ASSESSMENT & PLAN NOTE
- Anemia and thrombocytopenia likely 2/2 disease  - Revlimid has been on hold due to thrombocytopenia  - hemoglobin is 7.4 g/dL, platelet count is 26 k/uL  - Transfuse for hgb <7 g/dL, plt <10 k/uL   - continue to monitor.

## 2018-01-21 NOTE — SUBJECTIVE & OBJECTIVE
Subjective:     Interval History:   - patient had febrile episodes over past 24 hours. Tmax 101.8 at 1620 hrs on 1/20/18.  - he complains of fatigue this morning. Otherwise, he has no complaints. He denies shortness of breath, chest pain, nausea, vomiting, diarrhea, constipation.    Objective:     Vital Signs (Most Recent):  Temp: 98.6 °F (37 °C) (01/21/18 1146)  Pulse: 108 (01/21/18 1146)  Resp: 18 (01/21/18 1146)  BP: 108/69 (01/21/18 1146)  SpO2: 95 % (01/21/18 1146) Vital Signs (24h Range):  Temp:  [98.5 °F (36.9 °C)-101.8 °F (38.8 °C)] 98.6 °F (37 °C)  Pulse:  [] 108  Resp:  [16-20] 18  SpO2:  [93 %-96 %] 95 %  BP: (108-149)/(66-72) 108/69     Weight: 97.7 kg (215 lb 4.5 oz)  Body mass index is 28.4 kg/m².  Body surface area is 2.24 meters squared.    Intake/Output - Last 3 Shifts       01/19 0700 - 01/20 0659 01/20 0700 - 01/21 0659 01/21 0700 - 01/22 0659    P.O. 500 560 240    IV Piggyback  100     Total Intake(mL/kg) 500 (5.1) 660 (6.8) 240 (2.5)    Urine (mL/kg/hr) 1275 (0.5) 1900 (0.8) 200 (0.4)    Stool 0 (0)  0 (0)    Total Output 1275 1900 200    Net -775 -1240 +40           Urine Occurrence 1 x 1 x     Stool Occurrence 1 x  1 x          Physical Exam   Constitutional: He appears well-developed.   HENT:   Head: Normocephalic.   Eyes: EOM are normal. Pupils are equal, round, and reactive to light.   Neck: Normal range of motion. No tracheal deviation present.   Cardiovascular: Regular rhythm and normal heart sounds.  Exam reveals no gallop and no friction rub.    No murmur heard.  Tachycardic  Right chest wall nontender to palpation.  No new overlying skin changes   Pulmonary/Chest: Effort normal and breath sounds normal. No respiratory distress. He has no wheezes. He has no rales.   Abdominal: Soft. Bowel sounds are normal. He exhibits no distension and no mass. There is no tenderness. There is no guarding.   Musculoskeletal: He exhibits no edema.   Neurological: He is alert.   Slight left  sided droop (baseline per wife) and right pupil slightly larger than left.  No other focal deficits.   Skin: Skin is warm and dry.   Multiple areas of erythematous papular and vesicular lesions throughout body  No open bleeding lesions.       Significant Labs:   Labs have been reviewed.

## 2018-01-21 NOTE — ASSESSMENT & PLAN NOTE
- had silva auto SCT 6/25/14 for myeloma, day 1305  - 1 year restaging marrow on 7/24/15 showed 40% cellularity with trilineage hematopoiesis and no morphologic evidence of plasma cells.  Cytogenetics could not be done as the metaphase cells did not grow. SPEP/ELENO 12/28/17 without monoclonal peaks. FLC from 12/28 with kappa light chain of 4.3, ratio of 1.40   - PET CT did not reveal any evidence of myeloma at 1 year   - skeletal survey 12/28/17 was negative  - maintenance lenalidomide on hold

## 2018-01-21 NOTE — PROGRESS NOTES
01/21/18 0741   Vital Signs   Temp (!) 101 °F (38.3 °C)   Temp src Oral   Pulse (!) 114   Heart Rate Source Monitor   Resp 16   SpO2 (!) 93 %   Pulse Oximetry Type Intermittent   O2 Device (Oxygen Therapy) room air   BP (!) 149/72   BP Location Left arm   BP Method Automatic   Patient Position Lying   MD notified. Tylenol administered. Will continue to monitor.

## 2018-01-21 NOTE — ASSESSMENT & PLAN NOTE
- See oncologic hx on H&P for more information  - had silva auto 6/25/14   - was in CR at 1 year  - maintenance Revlimid on hold for 4 weeks due to thrombocytopenia. Will continue to hold at this time  - now with BLE weakness, inability to walk--Will likely need PT/OT after therapy  - recent SPEP with normal protein, FLC from 12/28 with kappa light chain of 4.3, ratio of 1.40   - bone marrow biopsy 12/28/17 : NO MORPHOLOGIC OR IMMUNOPHENOTYPIC EVIDENCE OF RESIDUAL PLASMA CELL NEOPLASM.  -- NO MORPHOLOGIC OR IMMUNOPHENOTYPIC EVIDENCE OF MARROW INVOLVEMENT BY  CD30-POSITIVE T-CELL LYMPHOPROLIFERATIVE DISORDER

## 2018-01-21 NOTE — PROGRESS NOTES
[facility]  Hematology  Bone Marrow Transplant  Progress Note    Patient Name: Lane Nunez Jr.  Admission Date: 1/12/2018  Hospital Length of Stay: 9 days  Code Status: DNR    Subjective:     Interval History:   - patient had febrile episodes over past 24 hours. Tmax 101.8 at 1620 hrs on 1/20/18.  - he complains of fatigue this morning. Otherwise, he has no complaints. He denies shortness of breath, chest pain, nausea, vomiting, diarrhea, constipation.    Objective:     Vital Signs (Most Recent):  Temp: 98.6 °F (37 °C) (01/21/18 1146)  Pulse: 108 (01/21/18 1146)  Resp: 18 (01/21/18 1146)  BP: 108/69 (01/21/18 1146)  SpO2: 95 % (01/21/18 1146) Vital Signs (24h Range):  Temp:  [98.5 °F (36.9 °C)-101.8 °F (38.8 °C)] 98.6 °F (37 °C)  Pulse:  [] 108  Resp:  [16-20] 18  SpO2:  [93 %-96 %] 95 %  BP: (108-149)/(66-72) 108/69     Weight: 97.7 kg (215 lb 4.5 oz)  Body mass index is 28.4 kg/m².  Body surface area is 2.24 meters squared.    Intake/Output - Last 3 Shifts       01/19 0700 - 01/20 0659 01/20 0700 - 01/21 0659 01/21 0700 - 01/22 0659    P.O. 500 560 240    IV Piggyback  100     Total Intake(mL/kg) 500 (5.1) 660 (6.8) 240 (2.5)    Urine (mL/kg/hr) 1275 (0.5) 1900 (0.8) 200 (0.4)    Stool 0 (0)  0 (0)    Total Output 1275 1900 200    Net -775 -1240 +40           Urine Occurrence 1 x 1 x     Stool Occurrence 1 x  1 x          Physical Exam   Constitutional: He appears well-developed.   HENT:   Head: Normocephalic.   Eyes: EOM are normal. Pupils are equal, round, and reactive to light.   Neck: Normal range of motion. No tracheal deviation present.   Cardiovascular: Regular rhythm and normal heart sounds.  Exam reveals no gallop and no friction rub.    No murmur heard.  Tachycardic  Right chest wall nontender to palpation.  No new overlying skin changes   Pulmonary/Chest: Effort normal and breath sounds normal. No respiratory distress. He has no wheezes. He has no rales.   Abdominal: Soft.  Bowel sounds are normal. He exhibits no distension and no mass. There is no tenderness. There is no guarding.   Musculoskeletal: He exhibits no edema.   Neurological: He is alert.   Slight left sided droop (baseline per wife) and right pupil slightly larger than left.  No other focal deficits.   Skin: Skin is warm and dry.   Multiple areas of erythematous papular and vesicular lesions throughout body  No open bleeding lesions.       Significant Labs:   Labs have been reviewed.    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 BM Bx negative. Initially planned for treatment with brentuximab.  Stage II ALCL (chest lymphadenopathy only)  -skin bx on 12/28/17- SKIN, RIGHT UPPER BACK, EXCISIONAL BIOPSY: CD30-positive, ALK negative T-cell lymphoproliferative disorder  - Viral workup HIV and hepatitis negative. EBV and CMV negative.  - will give brentuximab as an outpatient, possibly middle of this upcoming week.        History of peripheral stem cell transplant    - had silva auto SCT 6/25/14 for myeloma, day 1305  - 1 year restaging marrow on 7/24/15 showed 40% cellularity with trilineage hematopoiesis and no morphologic evidence of plasma cells.  Cytogenetics could not be done as the metaphase cells did not grow. SPEP/ELENO 12/28/17 without monoclonal peaks. FLC from 12/28 with kappa light chain of 4.3, ratio of 1.40   - PET CT did not reveal any evidence of myeloma at 1 year   - skeletal survey 12/28/17 was negative  - maintenance lenalidomide on hold         Multiple myeloma in remission    - See oncologic hx on H&P for more information  - had silva auto 6/25/14   - was in CR at 1 year  - maintenance Revlimid on hold for 4 weeks due to thrombocytopenia. Will continue to hold at this time  - now with BLE weakness, inability to walk--Will likely need PT/OT after therapy  - recent SPEP with normal protein, FLC from 12/28 with kappa light chain of  4.3, ratio of 1.40   - bone marrow biopsy 12/28/17 : NO MORPHOLOGIC OR IMMUNOPHENOTYPIC EVIDENCE OF RESIDUAL PLASMA CELL NEOPLASM.  -- NO MORPHOLOGIC OR IMMUNOPHENOTYPIC EVIDENCE OF MARROW INVOLVEMENT BY  CD30-POSITIVE T-CELL LYMPHOPROLIFERATIVE DISORDER        Cytopenia    - Anemia and thrombocytopenia likely 2/2 disease  - Revlimid has been on hold due to thrombocytopenia  - hemoglobin is 7.4 g/dL, platelet count is 26 k/uL  - Transfuse for hgb <7 g/dL, plt <10 k/uL   - continue to monitor.        Fever    - patient had febrile episodes over the past 24 hours. Unclear if the fever is related to an infection or possibly to the lymphoma.  - received cefepime yesterday. I have changed to augmentin today.   - follow up cultures drawn on 1/20/18.        Dysphagia    - possibly secondary to dementia  - He has refused a G-tube on multiple occasions in the past per chart  - During last admission, patient had regular diet with nectar thick liquids and aspiration precautions        Cardiomyopathy    - hx of decreased EF 30-40%    - no signs of volume overload. Continue to monitor.        Memory impairment    - no acute changes.  - continue home memantine         Depression    - no acute issues. Continue home amitriptyline, olanzapine, and venlafaxine        Hypertension    - blood pressure is with acceptable limits.  - continue carvedilol        BPH (benign prostatic hyperplasia)    - no acute issues. Continue home finasteride           Neuropathy    - no acute issues. Continue home gabapentin.        Glaucoma    - no acute issues. Continue home eye drops         Debility    - continue PT/OT. Awaiting SNF/nursing home placement.            VTE Risk Mitigation         Ordered     Medium Risk of VTE  Once      01/12/18 1740     Place sequential compression device  Until discontinued      01/12/18 1740        Disposition: change cefepime to augmentin. Check respiratory viral panel. Continue supportive care. Awaiting placement  at SNF/nursing home.    Costa Ram MD  Bone Marrow Transplant  [facility]

## 2018-01-22 PROBLEM — R91.8 ABNORMAL CT SCAN OF LUNG: Status: ACTIVE | Noted: 2018-01-22

## 2018-01-22 LAB
ALBUMIN SERPL BCP-MCNC: 2 G/DL
ALP SERPL-CCNC: 214 U/L
ALT SERPL W/O P-5'-P-CCNC: 14 U/L
ANION GAP SERPL CALC-SCNC: 10 MMOL/L
ANISOCYTOSIS BLD QL SMEAR: SLIGHT
AST SERPL-CCNC: 13 U/L
BASOPHILS # BLD AUTO: 0.01 K/UL
BASOPHILS NFR BLD: 0.2 %
BILIRUB SERPL-MCNC: 0.3 MG/DL
BUN SERPL-MCNC: 11 MG/DL
CALCIUM SERPL-MCNC: 9.1 MG/DL
CHLORIDE SERPL-SCNC: 101 MMOL/L
CO2 SERPL-SCNC: 27 MMOL/L
CREAT SERPL-MCNC: 0.9 MG/DL
DIFFERENTIAL METHOD: ABNORMAL
EOSINOPHIL # BLD AUTO: 0 K/UL
EOSINOPHIL NFR BLD: 0.2 %
ERYTHROCYTE [DISTWIDTH] IN BLOOD BY AUTOMATED COUNT: 18.6 %
EST. GFR  (AFRICAN AMERICAN): >60 ML/MIN/1.73 M^2
EST. GFR  (NON AFRICAN AMERICAN): >60 ML/MIN/1.73 M^2
GLUCOSE SERPL-MCNC: 98 MG/DL
HCT VFR BLD AUTO: 24.5 %
HGB BLD-MCNC: 7.8 G/DL
HYPOCHROMIA BLD QL SMEAR: ABNORMAL
IMM GRANULOCYTES # BLD AUTO: 0.02 K/UL
IMM GRANULOCYTES NFR BLD AUTO: 0.4 %
LYMPHOCYTES # BLD AUTO: 1.2 K/UL
LYMPHOCYTES NFR BLD: 24 %
MAGNESIUM SERPL-MCNC: 1.7 MG/DL
MCH RBC QN AUTO: 31.8 PG
MCHC RBC AUTO-ENTMCNC: 31.8 G/DL
MCV RBC AUTO: 100 FL
MONOCYTES # BLD AUTO: 0.8 K/UL
MONOCYTES NFR BLD: 16.1 %
NEUTROPHILS # BLD AUTO: 3 K/UL
NEUTROPHILS NFR BLD: 59.1 %
NRBC BLD-RTO: 1 /100 WBC
OVALOCYTES BLD QL SMEAR: ABNORMAL
PHOSPHATE SERPL-MCNC: 3.1 MG/DL
PLATELET # BLD AUTO: 26 K/UL
PLATELET BLD QL SMEAR: ABNORMAL
PMV BLD AUTO: ABNORMAL FL
POIKILOCYTOSIS BLD QL SMEAR: SLIGHT
POLYCHROMASIA BLD QL SMEAR: ABNORMAL
POTASSIUM SERPL-SCNC: 3.9 MMOL/L
PROT SERPL-MCNC: 6.6 G/DL
RBC # BLD AUTO: 2.45 M/UL
SODIUM SERPL-SCNC: 138 MMOL/L
WBC # BLD AUTO: 5.04 K/UL

## 2018-01-22 PROCEDURE — 99223 1ST HOSP IP/OBS HIGH 75: CPT | Mod: ,,, | Performed by: INTERNAL MEDICINE

## 2018-01-22 PROCEDURE — 20600001 HC STEP DOWN PRIVATE ROOM

## 2018-01-22 PROCEDURE — 36415 COLL VENOUS BLD VENIPUNCTURE: CPT

## 2018-01-22 PROCEDURE — 87040 BLOOD CULTURE FOR BACTERIA: CPT

## 2018-01-22 PROCEDURE — 84100 ASSAY OF PHOSPHORUS: CPT

## 2018-01-22 PROCEDURE — 25000003 PHARM REV CODE 250: Performed by: INTERNAL MEDICINE

## 2018-01-22 PROCEDURE — 83735 ASSAY OF MAGNESIUM: CPT

## 2018-01-22 PROCEDURE — 80053 COMPREHEN METABOLIC PANEL: CPT

## 2018-01-22 PROCEDURE — 99233 SBSQ HOSP IP/OBS HIGH 50: CPT | Mod: ,,, | Performed by: INTERNAL MEDICINE

## 2018-01-22 PROCEDURE — 87449 NOS EACH ORGANISM AG IA: CPT

## 2018-01-22 PROCEDURE — 97110 THERAPEUTIC EXERCISES: CPT

## 2018-01-22 PROCEDURE — 97803 MED NUTRITION INDIV SUBSEQ: CPT

## 2018-01-22 PROCEDURE — 25000003 PHARM REV CODE 250: Performed by: STUDENT IN AN ORGANIZED HEALTH CARE EDUCATION/TRAINING PROGRAM

## 2018-01-22 PROCEDURE — 85025 COMPLETE CBC W/AUTO DIFF WBC: CPT

## 2018-01-22 PROCEDURE — 87385 HISTOPLASMA CAPSUL AG IA: CPT

## 2018-01-22 PROCEDURE — 99223 1ST HOSP IP/OBS HIGH 75: CPT | Mod: GC,,, | Performed by: INTERNAL MEDICINE

## 2018-01-22 RX ORDER — SODIUM CHLORIDE 9 MG/ML
INJECTION, SOLUTION INTRAVENOUS CONTINUOUS
Status: ACTIVE | OUTPATIENT
Start: 2018-01-22 | End: 2018-01-22

## 2018-01-22 RX ORDER — FLUCONAZOLE 200 MG/1
400 TABLET ORAL DAILY
Status: DISCONTINUED | OUTPATIENT
Start: 2018-01-22 | End: 2018-01-22

## 2018-01-22 RX ORDER — ACYCLOVIR 200 MG/1
400 CAPSULE ORAL 2 TIMES DAILY
Status: DISCONTINUED | OUTPATIENT
Start: 2018-01-22 | End: 2018-01-23 | Stop reason: HOSPADM

## 2018-01-22 RX ADMIN — FAMOTIDINE 20 MG: 20 TABLET, FILM COATED ORAL at 08:01

## 2018-01-22 RX ADMIN — SODIUM CHLORIDE: 0.9 INJECTION, SOLUTION INTRAVENOUS at 09:01

## 2018-01-22 RX ADMIN — GABAPENTIN 300 MG: 300 CAPSULE ORAL at 11:01

## 2018-01-22 RX ADMIN — MORPHINE SULFATE 15 MG: 15 TABLET ORAL at 02:01

## 2018-01-22 RX ADMIN — STANDARDIZED SENNA CONCENTRATE AND DOCUSATE SODIUM 1 TABLET: 8.6; 5 TABLET, FILM COATED ORAL at 08:01

## 2018-01-22 RX ADMIN — ACETAMINOPHEN 650 MG: 325 TABLET ORAL at 07:01

## 2018-01-22 RX ADMIN — FLUCONAZOLE 400 MG: 200 TABLET ORAL at 02:01

## 2018-01-22 RX ADMIN — VENLAFAXINE HYDROCHLORIDE 150 MG: 37.5 CAPSULE, EXTENDED RELEASE ORAL at 08:01

## 2018-01-22 RX ADMIN — MORPHINE SULFATE 15 MG: 15 TABLET ORAL at 07:01

## 2018-01-22 RX ADMIN — AMOXICILLIN AND CLAVULANATE POTASSIUM 1 TABLET: 875; 125 TABLET, FILM COATED ORAL at 08:01

## 2018-01-22 RX ADMIN — MAGNESIUM OXIDE TAB 400 MG (241.3 MG ELEMENTAL MG) 800 MG: 400 (241.3 MG) TAB at 06:01

## 2018-01-22 RX ADMIN — MEMANTINE 10 MG: 10 TABLET ORAL at 08:01

## 2018-01-22 RX ADMIN — SODIUM CHLORIDE: 0.9 INJECTION, SOLUTION INTRAVENOUS at 01:01

## 2018-01-22 RX ADMIN — GALANTAMINE HYDROBROMIDE 16 MG: 4 TABLET, FILM COATED ORAL at 07:01

## 2018-01-22 RX ADMIN — ACYCLOVIR 400 MG: 200 CAPSULE ORAL at 08:01

## 2018-01-22 RX ADMIN — CARVEDILOL 6.25 MG: 6.25 TABLET, FILM COATED ORAL at 08:01

## 2018-01-22 RX ADMIN — OLANZAPINE 7.5 MG: 2.5 TABLET, FILM COATED ORAL at 08:01

## 2018-01-22 RX ADMIN — GABAPENTIN 300 MG: 300 CAPSULE ORAL at 06:01

## 2018-01-22 RX ADMIN — ACETAMINOPHEN 650 MG: 325 TABLET ORAL at 11:01

## 2018-01-22 RX ADMIN — GALANTAMINE HYDROBROMIDE 16 MG: 4 TABLET, FILM COATED ORAL at 04:01

## 2018-01-22 RX ADMIN — MAGNESIUM OXIDE TAB 400 MG (241.3 MG ELEMENTAL MG) 800 MG: 400 (241.3 MG) TAB at 11:01

## 2018-01-22 RX ADMIN — GABAPENTIN 300 MG: 300 CAPSULE ORAL at 01:01

## 2018-01-22 RX ADMIN — MORPHINE SULFATE 15 MG: 15 TABLET ORAL at 08:01

## 2018-01-22 RX ADMIN — ACETAMINOPHEN 650 MG: 325 TABLET ORAL at 04:01

## 2018-01-22 RX ADMIN — FLUTICASONE FUROATE AND VILANTEROL TRIFENATATE 1 PUFF: 100; 25 POWDER RESPIRATORY (INHALATION) at 08:01

## 2018-01-22 RX ADMIN — FINASTERIDE 5 MG: 5 TABLET, FILM COATED ORAL at 01:01

## 2018-01-22 NOTE — PLAN OF CARE
Problem: Patient Care Overview  Goal: Plan of Care Review  Outcome: Ongoing (interventions implemented as appropriate)  Fall, pressure ulcer, and infection precautions continued. MD aware of febrile episodes throughout the day. PO Amoxicillin discontinued, PO Acyclovir and Fluconazole initiated. Urine specimen sent to the lab. ID and Pulmonology consulted. Will continue to monitor.

## 2018-01-22 NOTE — SUBJECTIVE & OBJECTIVE
Past Medical History:   Diagnosis Date    Cancer     CHF (congestive heart failure)     Depression     GERD (gastroesophageal reflux disease)     High cholesterol     Hypertension     Left ventricular ejection fraction less than 40% 8/23/2017    Multiple myeloma     Renal disorder     Stroke     no sequelae       Past Surgical History:   Procedure Laterality Date    BACK SURGERY  1/2000    metal julieth at T-4    CHOLECYSTECTOMY      HEMORRHOID SURGERY      KYPHOSIS SURGERY  2010    PROSTATE SURGERY      SPINE SURGERY      TONSILLECTOMY         Review of patient's allergies indicates:   Allergen Reactions    Keflex [cephalexin] Rash     Patient tolerates cefepime without issues       Family History     Problem Relation (Age of Onset)    Heart disease Mother    Hypertension Mother    Kidney disease Mother    Scoliosis Daughter    Stroke Father        Social History Main Topics    Smoking status: Former Smoker     Packs/day: 1.00     Years: 40.00     Quit date: 11/15/2008    Smokeless tobacco: Never Used    Alcohol use No      Comment: rare    Drug use: No    Sexual activity: Yes     Partners: Female         Review of Systems       Positive findings are in BOLD. Otherwise negative ROS as below.     CONSTITUTIONAL: weight loss, fever, chills, weakness or fatigue.   HEENT: visual loss, blurred vision, double vision or yellow sclerae. Ears, Nose, Throat: No hearing loss, sneezing, congestion, runny nose or sore throat.   CARDIOVASCULAR: chest pain, chest pressure or chest discomfort. No palpitations or edema.   RESPIRATORY: shortness of breath, cough or sputum.   GASTROINTESTINAL:anorexia, nausea, vomiting or diarrhea. No abdominal pain or blood.   NEUROLOGICAL: headache, dizziness, syncope, paralysis, ataxia, numbness or tingling in the extremities. No change in bowel or bladder control.   MUSCULOSKELETAL: muscle, back pain, joint pain or stiffness.   HEMATOLOGIC: anemia, bleeding or bruising.    LYMPHATICS: enlarged nodes. No history of splenectomy.   PSYCHIATRIC: history of depression or anxiety.   ENDOCRINOLOGIC: No reports of sweating, cold or heat intolerance. No polyuria or polydipsia.     Objective:     Vital Signs (Most Recent):  Temp: (!) 101 °F (38.3 °C) (01/22/18 1602)  Pulse: (!) 121 (01/22/18 1602)  Resp: 17 (01/22/18 1602)  BP: (!) 141/78 (01/22/18 1602)  SpO2: (!) 94 % (01/22/18 1602) Vital Signs (24h Range):  Temp:  [98.8 °F (37.1 °C)-102.3 °F (39.1 °C)] 101 °F (38.3 °C)  Pulse:  [106-148] 121  Resp:  [16-20] 17  SpO2:  [92 %-96 %] 94 %  BP: (109-141)/(60-78) 141/78     Weight: 97.7 kg (215 lb 6.2 oz)  Body mass index is 28.42 kg/m².      Intake/Output Summary (Last 24 hours) at 01/22/18 1619  Last data filed at 01/22/18 1342   Gross per 24 hour   Intake          1608.33 ml   Output             1575 ml   Net            33.33 ml       Physical Exam     Gen: A and O *4, able to answer questions and without distress on room air.   HEENT: oral mucosa moist, free of lesions, no dental abnormalities, neck free of lymphadenopathy. JVD negative, no eye abnormalities. Pupils appears equal and reactive.   Chest: clear breath sounds, vesicular lesions on his chest. Non erythematous skin base.   Heart: RRR, No M/G/r.   Abdomen: soft, non tender, no masses. No g/r/r  Extremities:  Minimal edema. No cyanosis. No deformities.     Vents:       Lines/Drains/Airways     Central Venous Catheter Line                 Port A Cath Single Lumen left subclavian -- days          Peripheral Intravenous Line                 Midline Catheter Insertion/Assessment  - Single Lumen 01/17/18 0925 Right brachial vein 18g x 10cm 5 days                Significant Labs:    CBC/Anemia Profile:    Recent Labs  Lab 01/21/18  0649 01/22/18  0411   WBC 5.41 5.04   HGB 7.4* 7.8*   HCT 23.2* 24.5*   PLT 26* 26*   MCV 97 100*   RDW 18.3* 18.6*        Chemistries:    Recent Labs  Lab 01/21/18  0649 01/22/18  0411    138   K 3.6  3.9    101   CO2 26 27   BUN 12 11   CREATININE 0.9 0.9   CALCIUM 9.4 9.1   ALBUMIN 2.1* 2.0*   PROT 6.4 6.6   BILITOT 0.5 0.3   ALKPHOS 242* 214*   ALT 19 14   AST 17 13   MG 1.7 1.7   PHOS 3.3 3.1       All pertinent labs within the past 24 hours have been reviewed.    Significant Imaging:   I have reviewed and interpreted all pertinent imaging results/findings within the past 24 hours.

## 2018-01-22 NOTE — HPI
66 y/o Male with h/o MM s/p Autologous HSCT (2014) on maintenance velcade (which is currently being held for thrombocytopenia), recently diagnosed with Anaplastic Large Cell Lymphoma (dx in 12/2017 - by skin biopsy) who is admitted for generalized weakness.     Per history, He was first evaluated in 11/2017 for constitutional sx and soon after developed vesicular lesions on his chest. After undergoing diagnostic studies (skin biopsy and BM biopsy - in dec 2017), he was started on 75 mg of prednisone by hematology. Subsequently after hospital admission, he was diagnosed with ALCL and plans were made to initiate him on brentuximab.    He has now been re-admitted for failure to thrive, orthostatic hypotension and other non specific sx. This visit, with weaning of prednisone , he developed fevers. Work up of these fevers was unrevealing for infectious etiologies - ID evaluated him and suspected the fevers most likely from malignancy and unlikely from infectious etiology. However they also suggested a pulmonary consult to evaluate his abnormal CT scan. (CT chest and abdomen obtained for staging of ALCL).     He has cough, productive sputum (whitish) that has developed over the last couple of days.

## 2018-01-22 NOTE — ASSESSMENT & PLAN NOTE
- patient had febrile episodes over the past 24 hours. Unclear if the fever is related to an infection (?skin as source) or possibly to the lymphoma.  Ddx includes port that has been there for years.  - Started on cefepime 1/20/18, de-escalated to augmentin 1/21/18  - 1/20/18 cultures NGTD. RVP pending  - consult transplant ID

## 2018-01-22 NOTE — CONSULTS
[facility]  Pulmonology  Consult Note    Patient Name: Lane Nunez Jr.  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     Per history, He was first evaluated in 11/2017 for constitutional sx and soon after developed vesicular lesions on his chest. After undergoing diagnostic studies (skin biopsy and BM biopsy - in dec 2017), he was started on 75 mg of prednisone by hematology. Subsequently after hospital admission, he was diagnosed with ALCL and plans were made to initiate him on brentuximab.    He has now been re-admitted for failure to thrive, orthostatic hypotension and other non specific sx. This visit, with weaning of prednisone , he developed fevers. Work up of these fevers was unrevealing for infectious etiologies - ID evaluated him and suspected the fevers most likely from malignancy and unlikely from infectious etiology. However they also suggested a pulmonary consult to evaluate his abnormal CT scan. (CT chest and abdomen obtained for staging of ALCL).     He has cough, productive sputum (whitish) that has developed over the last couple of days.     Past Medical History:   Diagnosis Date    Cancer     CHF (congestive heart failure)     Depression     GERD (gastroesophageal reflux disease)     High cholesterol     Hypertension     Left ventricular ejection fraction less than 40% 8/23/2017    Multiple myeloma     Renal disorder     Stroke     no sequelae       Past  Surgical History:   Procedure Laterality Date    BACK SURGERY  1/2000    metal julieth at T-4    CHOLECYSTECTOMY      HEMORRHOID SURGERY      KYPHOSIS SURGERY  2010    PROSTATE SURGERY      SPINE SURGERY      TONSILLECTOMY         Review of patient's allergies indicates:   Allergen Reactions    Keflex [cephalexin] Rash     Patient tolerates cefepime without issues       Family History     Problem Relation (Age of Onset)    Heart disease Mother    Hypertension Mother    Kidney disease Mother    Scoliosis Daughter    Stroke Father        Social History Main Topics    Smoking status: Former Smoker     Packs/day: 1.00     Years: 40.00     Quit date: 11/15/2008    Smokeless tobacco: Never Used    Alcohol use No      Comment: rare    Drug use: No    Sexual activity: Yes     Partners: Female         Review of Systems       Positive findings are in BOLD. Otherwise negative ROS as below.     CONSTITUTIONAL: weight loss, fever, chills, weakness or fatigue.   HEENT: visual loss, blurred vision, double vision or yellow sclerae. Ears, Nose, Throat: No hearing loss, sneezing, congestion, runny nose or sore throat.   CARDIOVASCULAR: chest pain, chest pressure or chest discomfort. No palpitations or edema.   RESPIRATORY: shortness of breath, cough or sputum.   GASTROINTESTINAL:anorexia, nausea, vomiting or diarrhea. No abdominal pain or blood.   NEUROLOGICAL: headache, dizziness, syncope, paralysis, ataxia, numbness or tingling in the extremities. No change in bowel or bladder control.   MUSCULOSKELETAL: muscle, back pain, joint pain or stiffness.   HEMATOLOGIC: anemia, bleeding or bruising.   LYMPHATICS: enlarged nodes. No history of splenectomy.   PSYCHIATRIC: history of depression or anxiety.   ENDOCRINOLOGIC: No reports of sweating, cold or heat intolerance. No polyuria or polydipsia.     Objective:     Vital Signs (Most Recent):  Temp: (!) 101 °F (38.3 °C) (01/22/18 1602)  Pulse: (!) 121 (01/22/18 1602)  Resp: 17  (01/22/18 1602)  BP: (!) 141/78 (01/22/18 1602)  SpO2: (!) 94 % (01/22/18 1602) Vital Signs (24h Range):  Temp:  [98.8 °F (37.1 °C)-102.3 °F (39.1 °C)] 101 °F (38.3 °C)  Pulse:  [106-148] 121  Resp:  [16-20] 17  SpO2:  [92 %-96 %] 94 %  BP: (109-141)/(60-78) 141/78     Weight: 97.7 kg (215 lb 6.2 oz)  Body mass index is 28.42 kg/m².      Intake/Output Summary (Last 24 hours) at 01/22/18 1619  Last data filed at 01/22/18 1342   Gross per 24 hour   Intake          1608.33 ml   Output             1575 ml   Net            33.33 ml       Physical Exam     Gen: A and O *4, able to answer questions and without distress on room air.   HEENT: oral mucosa moist, free of lesions, no dental abnormalities, neck free of lymphadenopathy. JVD negative, no eye abnormalities. Pupils appears equal and reactive.   Chest: clear breath sounds, vesicular lesions on his chest. Non erythematous skin base.   Heart: RRR, No M/G/r.   Abdomen: soft, non tender, no masses. No g/r/r  Extremities:  Minimal edema. No cyanosis. No deformities.     Vents:       Lines/Drains/Airways     Central Venous Catheter Line                 Port A Cath Single Lumen left subclavian -- days          Peripheral Intravenous Line                 Midline Catheter Insertion/Assessment  - Single Lumen 01/17/18 0925 Right brachial vein 18g x 10cm 5 days                Significant Labs:    CBC/Anemia Profile:    Recent Labs  Lab 01/21/18  0649 01/22/18  0411   WBC 5.41 5.04   HGB 7.4* 7.8*   HCT 23.2* 24.5*   PLT 26* 26*   MCV 97 100*   RDW 18.3* 18.6*        Chemistries:    Recent Labs  Lab 01/21/18  0649 01/22/18  0411    138   K 3.6 3.9    101   CO2 26 27   BUN 12 11   CREATININE 0.9 0.9   CALCIUM 9.4 9.1   ALBUMIN 2.1* 2.0*   PROT 6.4 6.6   BILITOT 0.5 0.3   ALKPHOS 242* 214*   ALT 19 14   AST 17 13   MG 1.7 1.7   PHOS 3.3 3.1       All pertinent labs within the past 24 hours have been reviewed.    Significant Imaging:   I have reviewed and interpreted  all pertinent imaging results/findings within the past 24 hours.    Assessment/Plan:     Abnormal CT scan of lung    CT findings with scattered nodules in lung bases, Hilar and mediastinal LAD present (particularly on R side).     Given overall picture of ALCL - with CT scan of abdomen and chest revealing multiple sub cutaneous nodules, significant LAD. The most likely etiology of mediastinal and hilar LAD + pulmonary findings is secondary to ALCL. Infectious possibilities remain less likely.     Will defer bronchoscopy for now.   If clinical picture changes with more infectious signs clinically - We can revisit.               Thank you for your consult. I will follow-up with patient. Please contact us if you have any additional questions.     Azeb Nieto, DO  Pulmonology  [facility]

## 2018-01-22 NOTE — PROGRESS NOTES
[facility]  Adult Nutrition  Consult Note    SUMMARY     Recommendations    1. Encourage pt to increase PO intake with current diet.   2. If patient unable to consistently consume ONS (Boost Plus), substitute for Boost Breeze ONS.   3. RD to monitor and follow-up.    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 Pt states sore gums hindering PO intake.  Pt enjoys and can tolerate sweet potatoes, white potatoes, and green beans. Pt states experiencing diarrhea from consuming Boost Plus and would like to try Boost Breeze.     Nutrition Discharge Planning: PO intake >85% EEN, EPN    Nutrition Prescription Ordered    Current Diet Order: Regular nectar thick  Oral Nutrition Supplement: Boost plus     Evaluation of Received Nutrients/Fluid Intake    I/O: -4,278 ml     Intake/Output Summary (Last 24 hours) at 01/22/18 1324  Last data filed at 01/22/18 0736   Gross per 24 hour   Intake              720 ml   Output             1575 ml   Net             -855 ml     % Intake of Estimated Energy Needs:50%  % Meal Intake: 50%    Nutrition Risk Screen     Nutrition Risk Screen: dysphagia or difficulty swallowing    Nutrition/Diet History     Patient Reported Diet/Restrictions/Preferences: other (see comments) (None)  Food Preferences: sweet potatoes,white potatoes, green beans. No Restorationist or cultural preferences.      Supplemental Drinks or Food Habits: Boost Plus     Factors Affecting Nutritional Intake: chewing difficulties/inability to chew food, decreased appetite, diarrhea    Labs/Tests/Procedures/Meds    Pertinent Labs Reviewed: reviewed  Pertinent Labs Comments: Hgb 7.8, Plts 26, HCT 24.5, Alk Phos 214, A1c 6.1,  "Alb 2.0   Pertinent Medications Reviewed: reviewed  Pertinent Medications Comments: Carvedilol,NaCl, senna-docusate     Physical Findings    Overall Physical Appearance: overweight  Tubes: other (see comments) (-)  Oral/Mouth Cavity: WDL  Skin: intact    Anthropometrics    Height: 6' 1" (185.4 cm)  Weight Method: Standard Scale  Weight: 97.7 kg (215 lb 6.2 oz)  Ideal Body Weight (IBW), Male: 184 lb    % Ideal Body Weight, Male (lb): 117.06 lb    BMI (Calculated): 28.5  BMI Grade: 25 - 29.9 - overweight    Estimated/Assessed Needs    Weight Used For Calorie Calculations: 97.7 kg (215 lb 6.2 oz)     Energy Calorie Requirements (kcal): 2245 kcal/d   Energy Need Method: [place]-St Jeor (1.25 PAL)  RMR ([place]-St. Jeor Equation): 1795.88     Weight Used For Protein Calculations: 79.9 kg (176 lb 2.4 oz)  Protein Requirements: 160 g/d (2.0g/kg)    Fluid Need Method: RDA Method (Per MD or 1 ml/kcal)  RDA Method (mL): 2245    Assessment and Plan    Inadequate oral intake r/t decreased appetite AEB poor PO intake and poor appetite.   Status: New     Monitor and Evaluation    Food and Nutrient Intake: energy intake, food and beverage intake  Food and Nutrient Adminstration: diet order  Knowledge/Beliefs/Attitudes: food and nutrition knowledge/skill, beliefs and attitudes  Physical Activity and Function: nutrition-related ADLs and IADLs  Anthropometric Measurements: height/length, weight, weight change, body mass index  Biochemical Data, Medical Tests and Procedures: electrolyte and renal panel, gastrointestinal profile, glucose/endocrine profile, inflammatory profile, lipid profile  Nutrition-Focused Physical Findings: overall appearance    Nutrition Risk    Level of Risk: other (see comments) (2x/week)    Nutrition Follow-Up    RD Follow-up?: Yes    Jaun Ge  Dietetic Intern     "

## 2018-01-22 NOTE — PLAN OF CARE
Problem: Physical Therapy Goal  Goal: Physical Therapy Goal  Goals to be met by: 18     Patient will increase functional independence with mobility by performin. Supine to sit with Stand-by Assistance - met   2. Sit to stand transfer with Minimal Assistance - met   2a. Sit to stand transfer with Supervision   3. Bed to chair transfer with Minimal Assistance using appropriate AD or without AD. - met  4. Gait  x 3 feet with Minimal Assistance using appropriate AD or without AD. - met  4a. Gait x50 ft with SBA using appropriate AD or without AD  5. Wheelchair propulsion x20 feet with Stand-by Assistance using BUE and/or BLE.  6. Lower extremity exercise program x15 reps, with assistance as needed, in order to increase LE strength and (I) with functional mobility.        Outcome: Ongoing (interventions implemented as appropriate)  Progressing towards goals    Vu Méndez DPT  2018

## 2018-01-22 NOTE — SUBJECTIVE & OBJECTIVE
Past Medical History:   Diagnosis Date    Cancer     CHF (congestive heart failure)     Depression     GERD (gastroesophageal reflux disease)     High cholesterol     Hypertension     Left ventricular ejection fraction less than 40% 2017    Multiple myeloma     Renal disorder     Stroke     no sequelae       Past Surgical History:   Procedure Laterality Date    BACK SURGERY  2000    metal julieth at T-4    CHOLECYSTECTOMY      HEMORRHOID SURGERY      KYPHOSIS SURGERY  2010    PROSTATE SURGERY      SPINE SURGERY      TONSILLECTOMY         Review of patient's allergies indicates:   Allergen Reactions    Keflex [cephalexin] Rash     Patient tolerates cefepime without issues       Medications:  Prescriptions Prior to Admission   Medication Sig    amitriptyline (ELAVIL) 50 MG tablet Take 50 mg by mouth every evening.    aspirin (ECOTRIN) 81 MG EC tablet Take 81 mg by mouth every morning.     budesonide-formoterol 160-4.5 mcg (SYMBICORT) 160-4.5 mcg/actuation HFAA Inhale 2 puffs into the lungs every 12 (twelve) hours. Controller    cholecalciferol, vitamin D3, (VITAMIN D3) 2,000 unit Cap Take 1 capsule by mouth after lunch.     [] cyclobenzaprine (FLEXERIL) 5 MG tablet Take 1 tablet (5 mg total) by mouth 3 (three) times daily as needed for Muscle spasms.    diazePAM (VALIUM) 5 MG tablet Take 1 tablet (5 mg total) by mouth every 6 (six) hours as needed for Anxiety.    finasteride (PROSCAR) 5 mg tablet Take 5 mg by mouth after lunch.     furosemide (LASIX) 20 MG tablet Take 1 tablet (20 mg total) by mouth once daily.    gabapentin (NEURONTIN) 300 MG capsule Take 300 mg by mouth 3 (three) times daily.    galantamine (RAZADYNE) 8 MG Tab Take 16 mg by mouth 2 (two) times daily with meals.     lisinopril (PRINIVIL,ZESTRIL) 5 MG tablet Take 10 mg by mouth once daily.     memantine (NAMENDA) 5 MG Tab Take 10 mg by mouth 2 (two) times daily.     morphine (MSIR) 15 MG tablet Take 1 tablet  (15 mg total) by mouth every 6 (six) hours as needed for Pain.    MULTIVIT &MINERALS/FERROUS FUM (MULTI VITAMIN ORAL) Take by mouth.    olanzapine (ZYPREXA) 15 MG Tab Take 7.5 mg by mouth nightly.    potassium chloride (KLOR-CON) 10 MEQ TbSR Take 1 tablet (10 mEq total) by mouth once daily.    potassium chloride SA (K-DUR,KLOR-CON) 10 MEQ tablet Take 10 mEq by mouth once daily.    [] predniSONE (DELTASONE) 10 MG tablet Take 7.5 tablets (75 mg total) by mouth once daily.    ranitidine (ZANTAC) 150 MG capsule Take 150 mg by mouth 2 (two) times daily.    venlafaxine 150 mg TR24 Take 150 mg by mouth once daily.    vitamin E 1000 UNIT capsule Take 400 Units by mouth after lunch.     carvedilol (COREG) 3.125 MG tablet Take 6.25 mg by mouth 2 (two) times daily.    naproxen (EC NAPROSYN) 500 MG EC tablet     TRAVATAN Z 0.004 % Drop Place 1 drop into both eyes nightly.     Antibiotics     Start     Stop Route Frequency Ordered    18 2100  amoxicillin-clavulanate 875-125mg per tablet 1 tablet      -- Oral Every 12 hours 18 1211        Antifungals     None        Antivirals     None           Immunization History   Administered Date(s) Administered    Hepatitis B, Adult 2015, 2015, 2016    HiB PRP-T 2015, 2015, 2016    IPV 2015, 2015, 2016    Pneumococcal Conjugate - 13 Valent 2015, 2015    Tdap 2015, 2015, 2016       Family History     Problem Relation (Age of Onset)    Heart disease Mother    Hypertension Mother    Kidney disease Mother    Scoliosis Daughter    Stroke Father        Social History     Social History    Marital status:      Spouse name: N/A    Number of children: N/A    Years of education: N/A     Social History Main Topics    Smoking status: Former Smoker     Packs/day: 1.00     Years: 40.00     Quit date: 11/15/2008    Smokeless tobacco: Never Used    Alcohol use No       Comment: rare    Drug use: No    Sexual activity: Yes     Partners: Female     Other Topics Concern    None     Social History Narrative    None     Review of Systems   Constitutional: Positive for activity change, diaphoresis, fatigue, fever and unexpected weight change. Negative for appetite change and chills.   HENT: Negative for ear pain, mouth sores, sinus pressure and sore throat.    Eyes: Negative for photophobia, pain and redness.   Respiratory: Positive for cough and shortness of breath. Negative for wheezing.    Cardiovascular: Negative for chest pain and leg swelling.   Gastrointestinal: Negative for abdominal distention, abdominal pain, diarrhea and nausea.   Genitourinary: Negative for dysuria, flank pain, frequency and urgency.   Musculoskeletal: Negative for arthralgias, back pain, gait problem and myalgias.   Skin: Positive for rash. Negative for pallor.   Neurological: Negative for dizziness, tremors, seizures and headaches.   Psychiatric/Behavioral: Negative for confusion.     Objective:     Vital Signs (Most Recent):  Temp: 99.4 °F (37.4 °C) (01/22/18 1101)  Pulse: (!) 117 (01/22/18 1119)  Resp: 16 (01/22/18 1101)  BP: 127/63 (01/22/18 1101)  SpO2: 95 % (01/22/18 1101) Vital Signs (24h Range):  Temp:  [98.8 °F (37.1 °C)-102.3 °F (39.1 °C)] 99.4 °F (37.4 °C)  Pulse:  [106-148] 117  Resp:  [16-20] 16  SpO2:  [92 %-96 %] 95 %  BP: (109-144)/(60-74) 127/63     Weight: 97.7 kg (215 lb 6.2 oz)  Body mass index is 28.42 kg/m².    Estimated Creatinine Clearance: 95.3 mL/min (based on SCr of 0.9 mg/dL).    Physical Exam   Constitutional: He is oriented to person, place, and time. He appears well-developed. No distress.   HENT:   Head: Atraumatic.   Mouth/Throat: Oropharynx is clear and moist. No oropharyngeal exudate.   Eyes: Conjunctivae and EOM are normal. Pupils are equal, round, and reactive to light. No scleral icterus.   Neck: Neck supple.   Cardiovascular: Normal rate and regular rhythm.  Exam  reveals no friction rub.    No murmur heard.  Pulmonary/Chest: No respiratory distress. He has no wheezes. He has rales. He exhibits no tenderness.   Rare crackles in bases.   Abdominal: Soft. Bowel sounds are normal. He exhibits no distension. There is no tenderness. There is no rebound and no guarding.   Musculoskeletal: Normal range of motion. He exhibits no edema.   Lymphadenopathy:     He has no cervical adenopathy.   Neurological: He is alert and oriented to person, place, and time. No cranial nerve deficit. He exhibits normal muscle tone. Coordination normal.   Skin: Rash noted. No erythema.   Several nodular skin lesions over chest, face, back, and trunk -- none appear truly vesicular or erythematous.       Significant Labs:   CBC:   Recent Labs  Lab 01/21/18  0649 01/22/18  0411   WBC 5.41 5.04   HGB 7.4* 7.8*   HCT 23.2* 24.5*   PLT 26* 26*     CMP:   Recent Labs  Lab 01/21/18  0649 01/22/18  0411    138   K 3.6 3.9    101   CO2 26 27   * 98   BUN 12 11   CREATININE 0.9 0.9   CALCIUM 9.4 9.1   PROT 6.4 6.6   ALBUMIN 2.1* 2.0*   BILITOT 0.5 0.3   ALKPHOS 242* 214*   AST 17 13   ALT 19 14   ANIONGAP 11 10   EGFRNONAA >60.0 >60.0       Significant Imaging: I have reviewed all pertinent imaging results/findings within the past 24 hours.     CT chest:  1.No evidence of pulmonary embolus  2. Diffuse lymphadenopathy in hilar, mediastinal and axillary regions, worse since 12/2017, in this patient with given history of lymphoma.  3. Irregular soft tissue thickening along the azygous fissure as well as irregular opacities along the left pleural. This is nonspecific and could represent pleural extension of disease.  4. Pulmonary nodules in the right lung, nonspecific, new since 12/2017.    CT A/P:  1. No CT evidence of acute intra-abdominal abnormality.  2. Multiple superficial cutaneous nodules overlying the lower abdominal wall and pelvis which appear mildly increased in size and prominence  compared to prior examination. Clinical correlation with patient history and physical exam is advised.  3. Bilateral upper limit of normal inguinal lymph nodes, stable from prior exam. Mildly enlarged 1.1 cm right iliac chain lymph node, increased in size from prior study.  4. Interval development of scattered nodular opacities at the right lung base measuring up to 1.2 cm. These are nonspecific, but given the short term interval development likely represent infectious or inflammatory etiology with neoplastic process thought less likely. Consider short term imaging followup to insure resolution.    Microbiology:  1/12 blood cx: negative  1/12 urine cx: negativbe  1/15 blood cx: negatibe  1/15 RVP negative  1/20 blood cx: negative  1/20 urine cx: negative  1/21 blood cx: negative

## 2018-01-22 NOTE — ASSESSMENT & PLAN NOTE
CT findings with scattered nodules in lung bases, Hilar and mediastinal LAD present (particularly on R side).     Given overall picture of ALCL - with CT scan of abdomen and chest revealing multiple sub cutaneous nodules, significant LAD. The most likely etiology of mediastinal and hilar LAD + pulmonary findings is secondary to ALCL. Infectious possibilities remain less likely.     Will defer bronchoscopy for now.   If clinical picture changes with more infectious signs clinically - We can revisit.

## 2018-01-22 NOTE — ASSESSMENT & PLAN NOTE
- Anemia and thrombocytopenia likely 2/2 disease  - Revlimid has been on hold due to thrombocytopenia  - hemoglobin is 7.8 g/dL, platelet count is 26 k/uL  - Transfuse for hgb <7 g/dL, plt <10 k/uL   - continue to monitor.

## 2018-01-22 NOTE — CONSULTS
[facility]  Infectious Disease  Consult Note    Patient Name: Lane Nunez Jr.  MRN: [id number]  Admission Date: 1/12/2018  Hospital Length of Stay: 10 days  Attending Physician: Idris Bernabe MD  Primary Care Provider: Chuy Oconnell MD     Isolation Status: No active isolations    Patient information was obtained from patient and past medical records.      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 and progression over next decade to multiple lytic lesions throughout the spine, ribs, long bones, and compression fracture of C2 s/p PSF in 10/2013; s/p velcade/dex through auto-HSCT 6/25/2014 and subsequent maintenance lenalidomide with VGPR by 2015 and sCR currently) and recently diagonsed ALCL (developed fevers, sweats, weakness, 20lb weight loss, and nodular skin lesions in 10/2017; dx 11/2017 via skin biopsy; started on prednisone 100mg daily on 12/28) who was admitted on 1/12/2018 with FTT, orthostatic hypotension, global weakness/debility, and worsening skin lesions despite steroids for initiation of brentuximab. During the course of his hospitalization, he has developed intermittent fevers not responsive to empiric antibiotics and concurrent with the weaning of his steroids, prompting ID consultation today. Given timing of fever relapse with cessation of steroids and lack of decompensation despite fevers, I suspect these are tumor fevers that were previously masked by steroids. Another possibility would be indolent pulmonary opportunistic infection given new nodules on CT chest after steroid course. He remains stable despite these fevers.    - would stop augmentin -- patient has received 9 days of variable broad spectrum coverage without response and I do not  suspect a typical bacterial cause of his fevers  - ski lesions do not appear superinfected and are biopsy proven malignant disease -- low suspicion of concurrent viral infection based on appearance  - have sent fungal markers and quantiferon gold given new R lung pulmonary nodules -- would have pulmonary review his scans to comment on whether bronchoscopy for lavage would have any utility in this instance (lesions seem more peripheral to me); will hold on empiric antifungals for the moment  - as I am most suspicious that he has tumor fevers, I do not have a strong contraindication to withholding brentuximab over infectious concerns at this time -- will defer to primary team though on this issue            Thank you for your consult. I will follow-up with patient. Please contact us if you have any additional questions.     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 and progression over next decade to multiple lytic lesions throughout the spine, ribs, long bones, and compression fracture of C2 s/p PSF in 10/2013; s/p velcade/dex through auto-HSCT 6/25/2014 and subsequent maintenance lenalidomide with VGPR by 2015 and sCR currently) and recently diagonsed ALCL (developed fevers, sweats, weakness, 20lb weight loss, and nodular skin lesions in 10/2017; dx 11/2017 via skin biopsy; started on prednisone 100mg daily on 12/28) who was admitted on 1/12/2018 with FTT, orthostatic hypotension, global weakness/debility, and worsening skin lesions despite steroids for initiation of  brentuximab. During the course of his hospitalization, he has developed intermittent fevers concurrent with the weaning of his steroids,  prompting ID consultation today. Patient notes sweats with the fevers, similar to around when he was first diagnosed with ALCL and prior to steroid initiation. He also reports a cough productive of white sputum and NEWELL that has been present since diagnosis. He denies AMS, HA, neck stiffness, sore throat, URI symptoms, purulent sputum, CP, N/V/D, abdominal pain, dysuria, or superinfection of any of his lesions (although those in a skin fold are tender). He was started on empiric cefepime that was tailored to augmentin prior to ID consultation today. Workup for fevers has included negative blood/urine cultures and CT CAP that shows known diffuse LAD due to malignancy and scant R lung pulmonary nodules that are new from prior imaging.    Past Medical History:   Diagnosis Date    Cancer     CHF (congestive heart failure)     Depression     GERD (gastroesophageal reflux disease)     High cholesterol     Hypertension     Left ventricular ejection fraction less than 40% 2017    Multiple myeloma     Renal disorder     Stroke     no sequelae       Past Surgical History:   Procedure Laterality Date    BACK SURGERY  2000    metal julieth at T-4    CHOLECYSTECTOMY      HEMORRHOID SURGERY      KYPHOSIS SURGERY      PROSTATE SURGERY      SPINE SURGERY      TONSILLECTOMY         Review of patient's allergies indicates:   Allergen Reactions    Keflex [cephalexin] Rash     Patient tolerates cefepime without issues       Medications:  Prescriptions Prior to Admission   Medication Sig    amitriptyline (ELAVIL) 50 MG tablet Take 50 mg by mouth every evening.    aspirin (ECOTRIN) 81 MG EC tablet Take 81 mg by mouth every morning.     budesonide-formoterol 160-4.5 mcg (SYMBICORT) 160-4.5 mcg/actuation HFAA Inhale 2 puffs into the lungs every 12 (twelve) hours. Controller    cholecalciferol, vitamin D3, (VITAMIN D3) 2,000 unit Cap Take 1 capsule by mouth after lunch.     [] cyclobenzaprine (FLEXERIL) 5  MG tablet Take 1 tablet (5 mg total) by mouth 3 (three) times daily as needed for Muscle spasms.    diazePAM (VALIUM) 5 MG tablet Take 1 tablet (5 mg total) by mouth every 6 (six) hours as needed for Anxiety.    finasteride (PROSCAR) 5 mg tablet Take 5 mg by mouth after lunch.     furosemide (LASIX) 20 MG tablet Take 1 tablet (20 mg total) by mouth once daily.    gabapentin (NEURONTIN) 300 MG capsule Take 300 mg by mouth 3 (three) times daily.    galantamine (RAZADYNE) 8 MG Tab Take 16 mg by mouth 2 (two) times daily with meals.     lisinopril (PRINIVIL,ZESTRIL) 5 MG tablet Take 10 mg by mouth once daily.     memantine (NAMENDA) 5 MG Tab Take 10 mg by mouth 2 (two) times daily.     morphine (MSIR) 15 MG tablet Take 1 tablet (15 mg total) by mouth every 6 (six) hours as needed for Pain.    MULTIVIT &MINERALS/FERROUS FUM (MULTI VITAMIN ORAL) Take by mouth.    olanzapine (ZYPREXA) 15 MG Tab Take 7.5 mg by mouth nightly.    potassium chloride (KLOR-CON) 10 MEQ TbSR Take 1 tablet (10 mEq total) by mouth once daily.    potassium chloride SA (K-DUR,KLOR-CON) 10 MEQ tablet Take 10 mEq by mouth once daily.    [] predniSONE (DELTASONE) 10 MG tablet Take 7.5 tablets (75 mg total) by mouth once daily.    ranitidine (ZANTAC) 150 MG capsule Take 150 mg by mouth 2 (two) times daily.    venlafaxine 150 mg TR24 Take 150 mg by mouth once daily.    vitamin E 1000 UNIT capsule Take 400 Units by mouth after lunch.     carvedilol (COREG) 3.125 MG tablet Take 6.25 mg by mouth 2 (two) times daily.    naproxen (EC NAPROSYN) 500 MG EC tablet     TRAVATAN Z 0.004 % Drop Place 1 drop into both eyes nightly.     Antibiotics     Start     Stop Route Frequency Ordered    18  amoxicillin-clavulanate 875-125mg per tablet 1 tablet      -- Oral Every 12 hours 18 1211        Antifungals     None        Antivirals     None           Immunization History   Administered Date(s) Administered    Hepatitis B,  Adult 07/31/2015, 09/09/2015, 01/08/2016    HiB PRP-T 07/31/2015, 09/09/2015, 01/08/2016    IPV 07/31/2015, 09/09/2015, 01/08/2016    Pneumococcal Conjugate - 13 Valent 07/31/2015, 09/09/2015    Tdap 07/31/2015, 09/09/2015, 01/08/2016       Family History     Problem Relation (Age of Onset)    Heart disease Mother    Hypertension Mother    Kidney disease Mother    Scoliosis Daughter    Stroke Father        Social History     Social History    Marital status:      Spouse name: N/A    Number of children: N/A    Years of education: N/A     Social History Main Topics    Smoking status: Former Smoker     Packs/day: 1.00     Years: 40.00     Quit date: 11/15/2008    Smokeless tobacco: Never Used    Alcohol use No      Comment: rare    Drug use: No    Sexual activity: Yes     Partners: Female     Other Topics Concern    None     Social History Narrative    None     Review of Systems   Constitutional: Positive for activity change, diaphoresis, fatigue, fever and unexpected weight change. Negative for appetite change and chills.   HENT: Negative for ear pain, mouth sores, sinus pressure and sore throat.    Eyes: Negative for photophobia, pain and redness.   Respiratory: Positive for cough and shortness of breath. Negative for wheezing.    Cardiovascular: Negative for chest pain and leg swelling.   Gastrointestinal: Negative for abdominal distention, abdominal pain, diarrhea and nausea.   Genitourinary: Negative for dysuria, flank pain, frequency and urgency.   Musculoskeletal: Negative for arthralgias, back pain, gait problem and myalgias.   Skin: Positive for rash. Negative for pallor.   Neurological: Negative for dizziness, tremors, seizures and headaches.   Psychiatric/Behavioral: Negative for confusion.     Objective:     Vital Signs (Most Recent):  Temp: 99.4 °F (37.4 °C) (01/22/18 1101)  Pulse: (!) 117 (01/22/18 1119)  Resp: 16 (01/22/18 1101)  BP: 127/63 (01/22/18 1101)  SpO2: 95 % (01/22/18 1101)  Vital Signs (24h Range):  Temp:  [98.8 °F (37.1 °C)-102.3 °F (39.1 °C)] 99.4 °F (37.4 °C)  Pulse:  [106-148] 117  Resp:  [16-20] 16  SpO2:  [92 %-96 %] 95 %  BP: (109-144)/(60-74) 127/63     Weight: 97.7 kg (215 lb 6.2 oz)  Body mass index is 28.42 kg/m².    Estimated Creatinine Clearance: 95.3 mL/min (based on SCr of 0.9 mg/dL).    Physical Exam   Constitutional: He is oriented to person, place, and time. He appears well-developed. No distress.   HENT:   Head: Atraumatic.   Mouth/Throat: Oropharynx is clear and moist. No oropharyngeal exudate.   Eyes: Conjunctivae and EOM are normal. Pupils are equal, round, and reactive to light. No scleral icterus.   Neck: Neck supple.   Cardiovascular: Normal rate and regular rhythm.  Exam reveals no friction rub.    No murmur heard.  Pulmonary/Chest: No respiratory distress. He has no wheezes. He has rales. He exhibits no tenderness.   Rare crackles in bases.   Abdominal: Soft. Bowel sounds are normal. He exhibits no distension. There is no tenderness. There is no rebound and no guarding.   Musculoskeletal: Normal range of motion. He exhibits no edema.   Lymphadenopathy:     He has no cervical adenopathy.   Neurological: He is alert and oriented to person, place, and time. No cranial nerve deficit. He exhibits normal muscle tone. Coordination normal.   Skin: Rash noted. No erythema.   Several nodular skin lesions over chest, face, back, and trunk -- none appear truly vesicular or erythematous.       Significant Labs:   CBC:   Recent Labs  Lab 01/21/18  0649 01/22/18  0411   WBC 5.41 5.04   HGB 7.4* 7.8*   HCT 23.2* 24.5*   PLT 26* 26*     CMP:   Recent Labs  Lab 01/21/18  0649 01/22/18  0411    138   K 3.6 3.9    101   CO2 26 27   * 98   BUN 12 11   CREATININE 0.9 0.9   CALCIUM 9.4 9.1   PROT 6.4 6.6   ALBUMIN 2.1* 2.0*   BILITOT 0.5 0.3   ALKPHOS 242* 214*   AST 17 13   ALT 19 14   ANIONGAP 11 10   EGFRNONAA >60.0 >60.0       Significant Imaging: I have  reviewed all pertinent imaging results/findings within the past 24 hours.     CT chest:  1.No evidence of pulmonary embolus  2. Diffuse lymphadenopathy in hilar, mediastinal and axillary regions, worse since 12/2017, in this patient with given history of lymphoma.  3. Irregular soft tissue thickening along the azygous fissure as well as irregular opacities along the left pleural. This is nonspecific and could represent pleural extension of disease.  4. Pulmonary nodules in the right lung, nonspecific, new since 12/2017.    CT A/P:  1. No CT evidence of acute intra-abdominal abnormality.  2. Multiple superficial cutaneous nodules overlying the lower abdominal wall and pelvis which appear mildly increased in size and prominence compared to prior examination. Clinical correlation with patient history and physical exam is advised.  3. Bilateral upper limit of normal inguinal lymph nodes, stable from prior exam. Mildly enlarged 1.1 cm right iliac chain lymph node, increased in size from prior study.  4. Interval development of scattered nodular opacities at the right lung base measuring up to 1.2 cm. These are nonspecific, but given the short term interval development likely represent infectious or inflammatory etiology with neoplastic process thought less likely. Consider short term imaging followup to insure resolution.    Microbiology:  1/12 blood cx: negative  1/12 urine cx: negativbe  1/15 blood cx: negatibe  1/15 RVP negative  1/20 blood cx: negative  1/20 urine cx: negative  1/21 blood cx: negative

## 2018-01-22 NOTE — PT/OT/SLP PROGRESS
Physical Therapy Treatment    Patient Name:  Lane Nunez Jr.   MRN:  [id number]    Recommendations:     Discharge Recommendations:  nursing facility, skilled   Discharge Equipment Recommendations: none   Barriers to discharge: Inaccessible home and Decreased caregiver support    Assessment:     Lane Nunez Jr. is a 67 y.o. 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  He presents with the following impairments/functional limitations:  weakness, impaired functional mobilty, impaired endurance, impaired self care skills, gait instability, impaired balance, decreased safety awareness.  Pt limited c activity today d/t tachycardic (HR at 124 bpm supine).  Pt c c/o fatigue and lightheadedness prior to beginning therapy.  Pt performed bed mobility SBA, and sat EOB x15min c S.  Pt performed functional reach in sitting BUE c SBA; no LOB.  Pt would benefit from continued skilled PT while admitted 3x/wk to improve functional mobility.    Rehab Prognosis:  good; patient would benefit from acute skilled PT services to address these deficits and reach maximum level of function.      Recent Surgery: * No surgery found *      Plan:     During this hospitalization, patient to be seen 3 x/week to address the above listed problems via gait training, therapeutic activities, therapeutic exercises, wheelchair management/training  · Plan of Care Expires:  02/11/18   Plan of Care Reviewed with: patient    Subjective     Communicated with RN prior to session.  Patient found supine and family in room upon PT entry to room, agreeable to treatment.      Chief Complaint: decreased functional mobility  Patient comments/goals: to get better  Pain/Comfort:  · Pain Rating 1: 0/10    Patients cultural, spiritual, Anabaptist conflicts given the current situation: none stated    Objective:     Patient found with: telemetry, peripheral IV     General Precautions: Standard,  fall, aspiration   Orthopedic Precautions:N/A   Braces: N/A     Functional Mobility:  · Bed Mobility:     · Rolling Right: stand by assistance  · Scooting: stand by assistance  · Supine to Sit: stand by assistance  · Sit to Supine: stand by assistance  · Balance: Static sitting (S); Dynamic sitting (SBA)      AM-PAC 6 CLICK MOBILITY  Turning over in bed (including adjusting bedclothes, sheets and blankets)?: 3  Sitting down on and standing up from a chair with arms (e.g., wheelchair, bedside commode, etc.): 3  Moving from lying on back to sitting on the side of the bed?: 3  Moving to and from a bed to a chair (including a wheelchair)?: 3  Need to walk in hospital room?: 2  Climbing 3-5 steps with a railing?: 1  Total Score: 15       Therapeutic Activities and Exercises:  Educated pt on benefits of performing BLE exercises at least 4x/day  Sat EOB x15min  Functional Reach BUE 3x5  Therex: 3x10 (hip flex, hip abd/add, LAQ, AP)    Patient left HOB elevated with all lines intact, call button in reach, RN notified and family present..    GOALS:    Physical Therapy Goals        Problem: Physical Therapy Goal    Goal Priority Disciplines Outcome Goal Variances Interventions   Physical Therapy Goal     PT/OT, PT Ongoing (interventions implemented as appropriate)     Description:  Goals to be met by: 18     Patient will increase functional independence with mobility by performin. Supine to sit with Stand-by Assistance - met   2. Sit to stand transfer with Minimal Assistance - met   2a. Sit to stand transfer with Supervision   3. Bed to chair transfer with Minimal Assistance using appropriate AD or without AD. - met  4. Gait  x 3 feet with Minimal Assistance using appropriate AD or without AD. - met  4a. Gait x50 ft with SBA using appropriate AD or without AD  5. Wheelchair propulsion x20 feet with Stand-by Assistance using BUE and/or BLE.  6. Lower extremity exercise program x15 reps, with assistance as needed,  in order to increase LE strength and (I) with functional mobility.                         Time Tracking:     PT Received On: 01/22/18  PT Start Time: 1025     PT Stop Time: 1043  PT Total Time (min): 18 min     Billable Minutes: Therapeutic Exercise 18 min    Treatment Type: Treatment  PT/PTA: PT     PTA Visit Number: 0     Vu Méndez, PT  01/22/2018

## 2018-01-22 NOTE — PLAN OF CARE
Problem: Patient Care Overview  Goal: Plan of Care Review  Outcome: Ongoing (interventions implemented as appropriate)  Plan of care reviewed with the patient at the beginning of the shift. Pt has had no complaints overnight. He denies n/v/d. Pt with dysphagia on admission- has been on nectar thick liquids and regular diet. Tolerating pills in applesauce. Pain managed with PO Morphine. Pt complains of headache tonight. Tele monitoring continued. Sinus tach noted and unchanged. T max 102.3, pt given tylenol and 500ml NS bolus for tachycardia. Augmentin continued. Pt has multiple blisters that are mostly intact covering his entire body. Fall precautions maintained. Pt remained free from falls and injury this shift. Bed locked in lowest position, side rails up x2, call light within reach. Instructed pt to call for assistance as needed. Pt verbalized understanding. Family at the bedside. Vitals stable. No acute issues overnight. Will continue to monitor.

## 2018-01-22 NOTE — PROGRESS NOTES
01/22/18 1602   Vital Signs   Temp (!) 101 °F (38.3 °C)   Temp src Oral   Pulse (!) 121   Heart Rate Source Monitor   Resp 17   SpO2 (!) 94 %   O2 Device (Oxygen Therapy) room air   BP (!) 141/78   MAP (mmHg) 103   BP Location Left arm   Patient Position Lying   Dr. Martin notified of febrile episode. Will continue to monitor.

## 2018-01-22 NOTE — SUBJECTIVE & OBJECTIVE
Subjective:     Interval History: Continues to be febrile. No chest pain or shortness of breath.  Appetite is ok per wife.  In AM nurse noted him to be diaphoretic and tachycardic to 140s when sitting up in bed for breakfast.  IVF started.    Objective:     Vital Signs (Most Recent):  Temp: 99.6 °F (37.6 °C) (01/22/18 0852)  Pulse: (!) 148 (01/22/18 0848)  Resp: 20 (01/22/18 0848)  BP: 136/70 (01/22/18 0725)  SpO2: (!) 94 % (01/22/18 0725) Vital Signs (24h Range):  Temp:  [98.6 °F (37 °C)-102.3 °F (39.1 °C)] 99.6 °F (37.6 °C)  Pulse:  [106-148] 148  Resp:  [18-20] 20  SpO2:  [92 %-96 %] 94 %  BP: (108-144)/(60-74) 136/70     Weight: 97.7 kg (215 lb 4.5 oz)  Body mass index is 28.4 kg/m².  Body surface area is 2.24 meters squared.    ECOG SCORE         [unfilled]    Intake/Output - Last 3 Shifts       01/20 0700 - 01/21 0659 01/21 0700 - 01/22 0659 01/22 0700 - 01/23 0659    P.O. 560 1200     IV Piggyback 100      Total Intake(mL/kg) 660 (6.8) 1200 (12.3)     Urine (mL/kg/hr) 1900 (0.8) 1475 (0.6) 525 (2.6)    Stool  0 (0)     Total Output 1900 1475 525    Net -1240 -275 -525           Urine Occurrence 1 x      Stool Occurrence  1 x           Physical Exam   Constitutional: He appears well-developed.   HENT:   Head: Normocephalic.   Eyes: EOM are normal. Pupils are equal, round, and reactive to light.   Neck: Normal range of motion. No tracheal deviation present.   Cardiovascular: Regular rhythm and normal heart sounds.  Exam reveals no gallop and no friction rub.    No murmur heard.  Tachycardic  Right chest wall nontender to palpation.  No new overlying skin changes   Pulmonary/Chest: Effort normal and breath sounds normal. No respiratory distress. He has no wheezes. He has no rales.   Abdominal: Soft. Bowel sounds are normal. He exhibits no distension and no mass. There is no tenderness. There is no guarding.   Musculoskeletal: He exhibits no edema.   Neurological: He is alert.   Slight left sided droop (baseline  per wife) and right pupil slightly larger than left.  No other focal deficits.   Skin: Skin is warm and dry.   Multiple areas of erythematous papular and vesicular lesions throughout body  No open bleeding lesions.         Significant Labs:   CBC:   Recent Labs  Lab 01/21/18  0649 01/22/18  0411   WBC 5.41 5.04   HGB 7.4* 7.8*   HCT 23.2* 24.5*   PLT 26* 26*   , CMP:   Recent Labs  Lab 01/21/18  0649 01/22/18 0411    138   K 3.6 3.9    101   CO2 26 27   * 98   BUN 12 11   CREATININE 0.9 0.9   CALCIUM 9.4 9.1   PROT 6.4 6.6   ALBUMIN 2.1* 2.0*   BILITOT 0.5 0.3   ALKPHOS 242* 214*   AST 17 13   ALT 19 14   ANIONGAP 11 10   EGFRNONAA >60.0 >60.0    and Coagulation: No results for input(s): PT, INR, APTT in the last 48 hours.    Diagnostic Results:  None

## 2018-01-22 NOTE — PROGRESS NOTES
01/22/18 0725   Vital Signs   Temp (!) 101.9 °F (38.8 °C)   Temp src Oral   Pulse (!) 122   Heart Rate Source Monitor   Resp 18   SpO2 (!) 94 %   O2 Device (Oxygen Therapy) room air   /70   BP Location Left arm   BP Method Automatic   Patient Position Lying   Assessments (Pre/Post)   Level of Consciousness (AVPU) alert   Dr. Martin notified of febrile episode and tachycardia. Tylenol given, will continue to monitor.

## 2018-01-22 NOTE — PROGRESS NOTES
01/22/18 0852   Vital Signs   Temp 99.6 °F (37.6 °C)   Temp src Axillary   Pulse (!) 148   Patient sitting on the side of the bed eating breakfast, heart rate increased and profusely sweating. Patient assisted to lay back in bed and Dr. Martin notified.

## 2018-01-22 NOTE — PROGRESS NOTES
SW spoke to intake at [facility] and [facility] in [place], both facilities stated they are still reviewing clinicals and would need to call SW back. SW awaiting return call.

## 2018-01-22 NOTE — PLAN OF CARE
Problem: Oncology Care (Adult)  Intervention: Prevent Deficiencies/Improve Nutritional Intake   01/22/18 9783   Nutrition Interventions   Oral Nutrition Promotion calorie dense foods provided;calorie dense liquids provided     Recommendations     1. Encourage pt to increase PO intake with current diet.   2. If patient unable to consistently consume ONS (Boost Plus), substitute for Boost Breeze ONS.   3. RD to monitor and follow-up.     Goals: PO intake >85% EEN, EPN  Nutrition Goal Status: new  Communication of RD Recs: reviewed with RN      Assessment and Plan     Inadequate oral intake   r/t decreased appetite   AEB poor PO intake and poor appetite.   Status: New

## 2018-01-22 NOTE — CARE UPDATE
Chart check completed, elevated MEWS score noted 4, bedside RNRyland contacted, no concerns verbalized at this time, instructed to call [phone] for further concerns or assistance.  Hr 140's. Last set VS T 102.3, , RR 18 94 % /74    This pt has had significant issues with HR both in pt and out pt. Pt is 3.4 L neg with limited PO intake. Spoke with RAJESH Lua MD pertaining to pt status. Pt was re- cultured today, Febrile off and on. ABX de-escalated to PO Augmentin.  Other than Tylenol, no interventions at this time. No acute distress at this time.

## 2018-01-22 NOTE — PROGRESS NOTES
01/21/18 2037   Vital Signs   Temp (!) 102.3 °F (39.1 °C)   Temp src Oral   Pulse (!) 138   Heart Rate Source Monitor   Resp 18   SpO2 (!) 94 %   Pulse Oximetry Type Intermittent   O2 Device (Oxygen Therapy) room air   /74   BP Location Left arm   BP Method Automatic   Patient Position Lying   Dr Lua aware of temp and tachycardia. Tylenol given. Will administer bolus as ordered.

## 2018-01-22 NOTE — HPI
Mr. 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 and progression over next decade to multiple lytic lesions throughout the spine, ribs, long bones, and compression fracture of C2 s/p PSF in 10/2013; s/p velcade/dex through auto-HSCT 6/25/2014 and subsequent maintenance lenalidomide with VGPR by 2015 and sCR currently) and recently diagonsed ALCL (developed fevers, sweats, weakness, 20lb weight loss, and nodular skin lesions in 10/2017; dx 11/2017 via skin biopsy; started on prednisone 100mg daily on 12/28) who was admitted on 1/12/2018 with FTT, orthostatic hypotension, global weakness/debility, and worsening skin lesions despite steroids for initiation of brentuximab. During the course of his hospitalization, he has developed intermittent fevers concurrent with the weaning of his steroids, prompting ID consultation today. Patient notes sweats with the fevers, similar to around when he was first diagnosed with ALCL and prior to steroid initiation. He also reports a cough productive of white sputum and NEWELL that has been present since diagnosis. He denies AMS, HA, neck stiffness, sore throat, URI symptoms, purulent sputum, CP, N/V/D, abdominal pain, dysuria, or superinfection of any of his lesions (although those in a skin fold are tender). He was started on empiric cefepime that was tailored to augmentin prior to ID consultation today. Workup for fevers has included negative blood/urine cultures and CT CAP that shows known diffuse LAD due to malignancy and scant R lung pulmonary nodules that are new from prior imaging.

## 2018-01-22 NOTE — ASSESSMENT & PLAN NOTE
- had silva auto SCT 6/25/14 for myeloma, day 1306  - 1 year restaging marrow on 7/24/15 showed 40% cellularity with trilineage hematopoiesis and no morphologic evidence of plasma cells.  Cytogenetics could not be done as the metaphase cells did not grow. SPEP/ELENO 12/28/17 without monoclonal peaks. FLC from 12/28 with kappa light chain of 4.3, ratio of 1.40   - PET CT did not reveal any evidence of myeloma at 1 year   - skeletal survey 12/28/17 was negative  - maintenance lenalidomide on hold

## 2018-01-22 NOTE — ASSESSMENT & PLAN NOTE
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 and progression over next decade to multiple lytic lesions throughout the spine, ribs, long bones, and compression fracture of C2 s/p PSF in 10/2013; s/p velcade/dex through auto-HSCT 6/25/2014 and subsequent maintenance lenalidomide with VGPR by 2015 and sCR currently) and recently diagonsed ALCL (developed fevers, sweats, weakness, 20lb weight loss, and nodular skin lesions in 10/2017; dx 11/2017 via skin biopsy; started on prednisone 100mg daily on 12/28) who was admitted on 1/12/2018 with FTT, orthostatic hypotension, global weakness/debility, and worsening skin lesions despite steroids for initiation of brentuximab. During the course of his hospitalization, he has developed intermittent fevers not responsive to empiric antibiotics and concurrent with the weaning of his steroids, prompting ID consultation today. Given timing of fever relapse with cessation of steroids and lack of decompensation despite fevers, I suspect these are tumor fevers that were previously masked by steroids. Another possibility would be indolent pulmonary opportunistic infection given new nodules on CT chest after steroid course. He remains stable despite these fevers.    - would stop augmentin -- patient has received 9 days of variable broad spectrum coverage without response and I do not suspect a typical bacterial cause of his fevers  - ski lesions do not appear superinfected and are biopsy proven malignant disease -- low suspicion of concurrent viral infection based on appearance  - have sent fungal markers and quantiferon gold given new R lung pulmonary nodules -- would have pulmonary review his scans to comment on whether bronchoscopy for lavage would have any utility in this instance (lesions seem more peripheral to me); will hold on empiric antifungals for the moment  - as I am most suspicious that he has tumor fevers, I do not have a strong  contraindication to withholding brentuximab over infectious concerns at this time -- will defer to primary team though on this issue

## 2018-01-22 NOTE — PROGRESS NOTES
[facility]  Hematology  Bone Marrow Transplant  Progress Note    Patient Name: Lane Nunez Jr.  Admission Date: 1/12/2018  Hospital Length of Stay: 10 days  Code Status: DNR    Subjective:     Interval History: Continues to be febrile. No chest pain or shortness of breath.  Appetite is ok per wife.  In AM nurse noted him to be diaphoretic and tachycardic to 140s when sitting up in bed for breakfast.  IVF started.    Objective:     Vital Signs (Most Recent):  Temp: 99.6 °F (37.6 °C) (01/22/18 0852)  Pulse: (!) 148 (01/22/18 0848)  Resp: 20 (01/22/18 0848)  BP: 136/70 (01/22/18 0725)  SpO2: (!) 94 % (01/22/18 0725) Vital Signs (24h Range):  Temp:  [98.6 °F (37 °C)-102.3 °F (39.1 °C)] 99.6 °F (37.6 °C)  Pulse:  [106-148] 148  Resp:  [18-20] 20  SpO2:  [92 %-96 %] 94 %  BP: (108-144)/(60-74) 136/70     Weight: 97.7 kg (215 lb 4.5 oz)  Body mass index is 28.4 kg/m².  Body surface area is 2.24 meters squared.    ECOG SCORE         [unfilled]    Intake/Output - Last 3 Shifts       01/20 0700 - 01/21 0659 01/21 0700 - 01/22 0659 01/22 0700 - 01/23 0659    P.O. 560 1200     IV Piggyback 100      Total Intake(mL/kg) 660 (6.8) 1200 (12.3)     Urine (mL/kg/hr) 1900 (0.8) 1475 (0.6) 525 (2.6)    Stool  0 (0)     Total Output 1900 1475 525    Net -1240 -275 -525           Urine Occurrence 1 x      Stool Occurrence  1 x           Physical Exam   Constitutional: He appears well-developed.   HENT:   Head: Normocephalic.   Eyes: EOM are normal. Pupils are equal, round, and reactive to light.   Neck: Normal range of motion. No tracheal deviation present.   Cardiovascular: Regular rhythm and normal heart sounds.  Exam reveals no gallop and no friction rub.    No murmur heard.  Tachycardic  Right chest wall nontender to palpation.  No new overlying skin changes   Pulmonary/Chest: Effort normal and breath sounds normal. No respiratory distress. He has no wheezes. He has no rales.   Abdominal: Soft. Bowel sounds are  normal. He exhibits no distension and no mass. There is no tenderness. There is no guarding.   Musculoskeletal: He exhibits no edema.   Neurological: He is alert.   Slight left sided droop (baseline per wife) and right pupil slightly larger than left.  No other focal deficits.   Skin: Skin is warm and dry.   Multiple areas of erythematous papular and vesicular lesions throughout body  No open bleeding lesions.         Significant Labs:   CBC:   Recent Labs  Lab 01/21/18  0649 01/22/18  0411   WBC 5.41 5.04   HGB 7.4* 7.8*   HCT 23.2* 24.5*   PLT 26* 26*   , CMP:   Recent Labs  Lab 01/21/18  0649 01/22/18  0411    138   K 3.6 3.9    101   CO2 26 27   * 98   BUN 12 11   CREATININE 0.9 0.9   CALCIUM 9.4 9.1   PROT 6.4 6.6   ALBUMIN 2.1* 2.0*   BILITOT 0.5 0.3   ALKPHOS 242* 214*   AST 17 13   ALT 19 14   ANIONGAP 11 10   EGFRNONAA >60.0 >60.0    and Coagulation: No results for input(s): PT, INR, APTT in the last 48 hours.    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 BM Bx negative. Initially planned for treatment with brentuximab.  Stage II ALCL (chest lymphadenopathy only)  -skin bx on 12/28/17- SKIN, RIGHT UPPER BACK, EXCISIONAL BIOPSY: CD30-positive, ALK negative T-cell lymphoproliferative disorder  - Viral workup HIV and hepatitis negative. EBV and CMV negative.  - will give brentuximab as an outpatient, possibly middle of this upcoming week.        Fever    - patient had febrile episodes over the past 24 hours. Unclear if the fever is related to an infection (?skin as source) or possibly to the lymphoma.  Ddx includes port that has been there for years.  - Started on cefepime 1/20/18, de-escalated to augmentin 1/21/18  - 1/20/18 cultures NGTD. RVP pending  - consult transplant ID        Glaucoma    - no acute issues. Continue home eye drops         Neuropathy    - no acute issues.  Continue home gabapentin.        Dysphagia    - possibly secondary to dementia  - He has refused a G-tube on multiple occasions in the past per chart  - During last admission, patient had regular diet with nectar thick liquids and aspiration precautions        Multiple myeloma in remission    - See oncologic hx on H&P for more information  - had silva auto 6/25/14   - was in CR at 1 year  - maintenance Revlimid on hold for 4 weeks due to thrombocytopenia. Will continue to hold at this time  - now with BLE weakness, inability to walk--Will likely need PT/OT after therapy  - recent SPEP with normal protein, FLC from 12/28 with kappa light chain of 4.3, ratio of 1.40   - bone marrow biopsy 12/28/17 : NO MORPHOLOGIC OR IMMUNOPHENOTYPIC EVIDENCE OF RESIDUAL PLASMA CELL NEOPLASM.  -- NO MORPHOLOGIC OR IMMUNOPHENOTYPIC EVIDENCE OF MARROW INVOLVEMENT BY  CD30-POSITIVE T-CELL LYMPHOPROLIFERATIVE DISORDER        Cytopenia    - Anemia and thrombocytopenia likely 2/2 disease  - Revlimid has been on hold due to thrombocytopenia  - hemoglobin is 7.8 g/dL, platelet count is 26 k/uL  - Transfuse for hgb <7 g/dL, plt <10 k/uL   - continue to monitor.        Cardiomyopathy    - hx of decreased EF 30-40%    - no signs of volume overload. Continue to monitor.        Hypertension    - blood pressure is with acceptable limits.  - continue carvedilol        History of peripheral stem cell transplant    - had silva auto SCT 6/25/14 for myeloma, day 1306  - 1 year restaging marrow on 7/24/15 showed 40% cellularity with trilineage hematopoiesis and no morphologic evidence of plasma cells.  Cytogenetics could not be done as the metaphase cells did not grow. SPEP/ELENO 12/28/17 without monoclonal peaks. FLC from 12/28 with kappa light chain of 4.3, ratio of 1.40   - PET CT did not reveal any evidence of myeloma at 1 year   - skeletal survey 12/28/17 was negative  - maintenance lenalidomide on hold         Debility    - continue PT/OT. Awaiting  SNF/nursing home placement.        BPH (benign prostatic hyperplasia)    - no acute issues. Continue home finasteride           Memory impairment    - no acute changes.  - continue home memantine and galantamine        Depression    - no acute issues. Continue home amitriptyline, olanzapine, and venlafaxine            VTE Risk Mitigation         Ordered     Medium Risk of VTE  Once      01/12/18 1740     Place sequential compression device  Until discontinued      01/12/18 1740          Disposition: inpatient    Russel Martin MD  Bone Marrow Transplant  [facility]

## 2018-01-23 VITALS
SYSTOLIC BLOOD PRESSURE: 138 MMHG | DIASTOLIC BLOOD PRESSURE: 81 MMHG | TEMPERATURE: 100 F | HEIGHT: 73 IN | OXYGEN SATURATION: 99 % | BODY MASS INDEX: 28.54 KG/M2 | HEART RATE: 121 BPM | RESPIRATION RATE: 18 BRPM | WEIGHT: 215.38 LBS

## 2018-01-23 LAB
ABO + RH BLD: NORMAL
ALBUMIN SERPL BCP-MCNC: 1.9 G/DL
ALP SERPL-CCNC: 168 U/L
ALT SERPL W/O P-5'-P-CCNC: 11 U/L
ANION GAP SERPL CALC-SCNC: 9 MMOL/L
ANISOCYTOSIS BLD QL SMEAR: SLIGHT
AST SERPL-CCNC: 14 U/L
BASOPHILS # BLD AUTO: 0 K/UL
BASOPHILS NFR BLD: 0 %
BILIRUB SERPL-MCNC: 0.3 MG/DL
BLD GP AB SCN CELLS X3 SERPL QL: NORMAL
BLD PROD TYP BPU: NORMAL
BLOOD UNIT EXPIRATION DATE: NORMAL
BLOOD UNIT TYPE CODE: 9500
BLOOD UNIT TYPE: NORMAL
BUN SERPL-MCNC: 10 MG/DL
CALCIUM SERPL-MCNC: 8.7 MG/DL
CHLORIDE SERPL-SCNC: 102 MMOL/L
CO2 SERPL-SCNC: 27 MMOL/L
CODING SYSTEM: NORMAL
COPPER SERPL-MCNC: 2026 UG/L (ref 665–1480)
CREAT SERPL-MCNC: 0.9 MG/DL
CRYPTOC AG SER QL LA: NEGATIVE
DIFFERENTIAL METHOD: ABNORMAL
DISPENSE STATUS: NORMAL
ENTEROVIRUS: NOT DETECTED
EOSINOPHIL # BLD AUTO: 0 K/UL
EOSINOPHIL NFR BLD: 0.3 %
ERYTHROCYTE [DISTWIDTH] IN BLOOD BY AUTOMATED COUNT: 18.5 %
EST. GFR  (AFRICAN AMERICAN): >60 ML/MIN/1.73 M^2
EST. GFR  (NON AFRICAN AMERICAN): >60 ML/MIN/1.73 M^2
GLUCOSE SERPL-MCNC: 84 MG/DL
HCT VFR BLD AUTO: 20.1 %
HGB BLD-MCNC: 6.4 G/DL
HUMAN BOCAVIRUS: NOT DETECTED
HUMAN CORONAVIRUS, COMMON COLD VIRUS: NOT DETECTED
HYPOCHROMIA BLD QL SMEAR: ABNORMAL
IMM GRANULOCYTES # BLD AUTO: 0.01 K/UL
IMM GRANULOCYTES NFR BLD AUTO: 0.3 %
INFLUENZA A - H1N1-09: NOT DETECTED
LYMPHOCYTES # BLD AUTO: 1 K/UL
LYMPHOCYTES NFR BLD: 31.4 %
MAGNESIUM SERPL-MCNC: 1.6 MG/DL
MCH RBC QN AUTO: 31.7 PG
MCHC RBC AUTO-ENTMCNC: 31.8 G/DL
MCV RBC AUTO: 100 FL
MONOCYTES # BLD AUTO: 0.6 K/UL
MONOCYTES NFR BLD: 17.8 %
NEUTROPHILS # BLD AUTO: 1.6 K/UL
NEUTROPHILS NFR BLD: 50.2 %
NRBC BLD-RTO: 0 /100 WBC
NUM UNITS TRANS PACKED RBC: NORMAL
OVALOCYTES BLD QL SMEAR: ABNORMAL
PARAINFLUENZA: NOT DETECTED
PHOSPHATE SERPL-MCNC: 3.1 MG/DL
PLATELET # BLD AUTO: 25 K/UL
PLATELET BLD QL SMEAR: ABNORMAL
PMV BLD AUTO: 13.1 FL
POIKILOCYTOSIS BLD QL SMEAR: SLIGHT
POLYCHROMASIA BLD QL SMEAR: ABNORMAL
POTASSIUM SERPL-SCNC: 3.6 MMOL/L
PROT SERPL-MCNC: 6.2 G/DL
RBC # BLD AUTO: 2.02 M/UL
RVP - ADENOVIRUS: NOT DETECTED
RVP - HUMAN METAPNEUMOVIRUS (HMPV): NOT DETECTED
RVP - INFLUENZA A: NOT DETECTED
RVP - INFLUENZA B: NOT DETECTED
RVP - RESPIRATORY SYNCTIAL VIRUS (RSV) A: NOT DETECTED
RVP - RESPIRATORY VIRAL PANEL, SOURCE: NORMAL
RVP - RHINOVIRUS: NOT DETECTED
SODIUM SERPL-SCNC: 138 MMOL/L
WBC # BLD AUTO: 3.15 K/UL

## 2018-01-23 PROCEDURE — 86644 CMV ANTIBODY: CPT

## 2018-01-23 PROCEDURE — 86580 TB INTRADERMAL TEST: CPT | Performed by: NURSE PRACTITIONER

## 2018-01-23 PROCEDURE — 86480 TB TEST CELL IMMUN MEASURE: CPT

## 2018-01-23 PROCEDURE — 85025 COMPLETE CBC W/AUTO DIFF WBC: CPT

## 2018-01-23 PROCEDURE — G8987 SELF CARE CURRENT STATUS: HCPCS | Mod: CL

## 2018-01-23 PROCEDURE — 99233 SBSQ HOSP IP/OBS HIGH 50: CPT | Mod: ,,, | Performed by: INTERNAL MEDICINE

## 2018-01-23 PROCEDURE — P9040 RBC LEUKOREDUCED IRRADIATED: HCPCS

## 2018-01-23 PROCEDURE — 80053 COMPREHEN METABOLIC PANEL: CPT

## 2018-01-23 PROCEDURE — 92610 EVALUATE SWALLOWING FUNCTION: CPT

## 2018-01-23 PROCEDURE — 36430 TRANSFUSION BLD/BLD COMPNT: CPT

## 2018-01-23 PROCEDURE — 25000003 PHARM REV CODE 250: Performed by: STUDENT IN AN ORGANIZED HEALTH CARE EDUCATION/TRAINING PROGRAM

## 2018-01-23 PROCEDURE — 87449 NOS EACH ORGANISM AG IA: CPT

## 2018-01-23 PROCEDURE — 86920 COMPATIBILITY TEST SPIN: CPT

## 2018-01-23 PROCEDURE — 83735 ASSAY OF MAGNESIUM: CPT

## 2018-01-23 PROCEDURE — 36415 COLL VENOUS BLD VENIPUNCTURE: CPT

## 2018-01-23 PROCEDURE — 84100 ASSAY OF PHOSPHORUS: CPT

## 2018-01-23 PROCEDURE — 97110 THERAPEUTIC EXERCISES: CPT

## 2018-01-23 PROCEDURE — 86850 RBC ANTIBODY SCREEN: CPT

## 2018-01-23 PROCEDURE — 87305 ASPERGILLUS AG IA: CPT

## 2018-01-23 PROCEDURE — 99233 SBSQ HOSP IP/OBS HIGH 50: CPT | Mod: GC,,, | Performed by: INTERNAL MEDICINE

## 2018-01-23 PROCEDURE — 86403 PARTICLE AGGLUT ANTBDY SCRN: CPT

## 2018-01-23 PROCEDURE — 63600175 PHARM REV CODE 636 W HCPCS: Performed by: NURSE PRACTITIONER

## 2018-01-23 PROCEDURE — 25000003 PHARM REV CODE 250: Performed by: INTERNAL MEDICINE

## 2018-01-23 RX ORDER — ACYCLOVIR 200 MG/1
400 CAPSULE ORAL 2 TIMES DAILY
Qty: 120 CAPSULE | Refills: 11 | Status: SHIPPED | OUTPATIENT
Start: 2018-01-23 | End: 2018-05-01

## 2018-01-23 RX ORDER — IPRATROPIUM BROMIDE AND ALBUTEROL SULFATE 2.5; .5 MG/3ML; MG/3ML
3 SOLUTION RESPIRATORY (INHALATION) EVERY 4 HOURS PRN
Qty: 1 BOX | Refills: 0 | Status: SHIPPED | OUTPATIENT
Start: 2018-01-23 | End: 2019-01-23

## 2018-01-23 RX ORDER — DIPHENHYDRAMINE HCL 25 MG
25 CAPSULE ORAL ONCE AS NEEDED
Status: COMPLETED | OUTPATIENT
Start: 2018-01-23 | End: 2018-01-23

## 2018-01-23 RX ORDER — AMOXICILLIN 250 MG
1 CAPSULE ORAL DAILY
COMMUNITY
Start: 2018-01-24

## 2018-01-23 RX ORDER — SODIUM CHLORIDE 9 MG/ML
INJECTION, SOLUTION INTRAVENOUS CONTINUOUS
Status: DISCONTINUED | OUTPATIENT
Start: 2018-01-23 | End: 2018-01-23 | Stop reason: HOSPADM

## 2018-01-23 RX ORDER — POLYETHYLENE GLYCOL 3350 17 G/17G
17 POWDER, FOR SOLUTION ORAL 3 TIMES DAILY PRN
Qty: 30 EACH | Refills: 1 | Status: SHIPPED | OUTPATIENT
Start: 2018-01-23

## 2018-01-23 RX ORDER — MORPHINE SULFATE 15 MG/1
15 TABLET ORAL EVERY 6 HOURS PRN
Qty: 120 TABLET | Refills: 0 | Status: SHIPPED | OUTPATIENT
Start: 2018-01-23 | End: 2018-06-13 | Stop reason: SDUPTHER

## 2018-01-23 RX ORDER — HYDROCODONE BITARTRATE AND ACETAMINOPHEN 500; 5 MG/1; MG/1
TABLET ORAL
Status: DISCONTINUED | OUTPATIENT
Start: 2018-01-23 | End: 2018-01-23 | Stop reason: HOSPADM

## 2018-01-23 RX ORDER — ACETAMINOPHEN 325 MG/1
650 TABLET ORAL ONCE AS NEEDED
Status: DISCONTINUED | OUTPATIENT
Start: 2018-01-23 | End: 2018-01-23 | Stop reason: HOSPADM

## 2018-01-23 RX ADMIN — FAMOTIDINE 20 MG: 20 TABLET, FILM COATED ORAL at 08:01

## 2018-01-23 RX ADMIN — Medication 5 UNITS: at 01:01

## 2018-01-23 RX ADMIN — FLUTICASONE FUROATE AND VILANTEROL TRIFENATATE 1 PUFF: 100; 25 POWDER RESPIRATORY (INHALATION) at 08:01

## 2018-01-23 RX ADMIN — MORPHINE SULFATE 15 MG: 15 TABLET ORAL at 03:01

## 2018-01-23 RX ADMIN — ACETAMINOPHEN 650 MG: 325 TABLET ORAL at 08:01

## 2018-01-23 RX ADMIN — MORPHINE SULFATE 15 MG: 15 TABLET ORAL at 08:01

## 2018-01-23 RX ADMIN — CARVEDILOL 6.25 MG: 6.25 TABLET, FILM COATED ORAL at 08:01

## 2018-01-23 RX ADMIN — FINASTERIDE 5 MG: 5 TABLET, FILM COATED ORAL at 01:01

## 2018-01-23 RX ADMIN — MAGNESIUM OXIDE TAB 400 MG (241.3 MG ELEMENTAL MG) 800 MG: 400 (241.3 MG) TAB at 08:01

## 2018-01-23 RX ADMIN — MEMANTINE 10 MG: 10 TABLET ORAL at 08:01

## 2018-01-23 RX ADMIN — DIPHENHYDRAMINE HYDROCHLORIDE 25 MG: 25 CAPSULE ORAL at 10:01

## 2018-01-23 RX ADMIN — GABAPENTIN 300 MG: 300 CAPSULE ORAL at 01:01

## 2018-01-23 RX ADMIN — MAGNESIUM OXIDE TAB 400 MG (241.3 MG ELEMENTAL MG) 800 MG: 400 (241.3 MG) TAB at 12:01

## 2018-01-23 RX ADMIN — STANDARDIZED SENNA CONCENTRATE AND DOCUSATE SODIUM 1 TABLET: 8.6; 5 TABLET, FILM COATED ORAL at 08:01

## 2018-01-23 RX ADMIN — SODIUM CHLORIDE: 0.9 INJECTION, SOLUTION INTRAVENOUS at 10:01

## 2018-01-23 RX ADMIN — GALANTAMINE HYDROBROMIDE 16 MG: 4 TABLET, FILM COATED ORAL at 08:01

## 2018-01-23 RX ADMIN — VENLAFAXINE HYDROCHLORIDE 150 MG: 37.5 CAPSULE, EXTENDED RELEASE ORAL at 08:01

## 2018-01-23 RX ADMIN — ACYCLOVIR 400 MG: 200 CAPSULE ORAL at 08:01

## 2018-01-23 RX ADMIN — GABAPENTIN 300 MG: 300 CAPSULE ORAL at 05:01

## 2018-01-23 NOTE — ASSESSMENT & PLAN NOTE
- hx of decreased EF 30-40%    - no signs of volume overload. Continue to monitor.  - coreg BID per HTN

## 2018-01-23 NOTE — CARE UPDATE
RN Proactive Rounding Note  Time of Visit: 11:38    Admit Date: 2018  LOS: 11  Code Status: DNR   Date of Visit: 2018  : 1950  Age: 67 y.o.  Sex: male  Bed: [address] [place] [address]:   MRN: [id number]  Was the patient discharged from an ICU this admission? no  Was the patient discharged from a PACU within last 24 hours? no  Did the patient receive conscious sedation/general anesthesia in last 24 hours? no  Was the patient in the ED within the past 24 hours? no  Was the patient started on NIPPV within the past 24 hours? no  Attending Physician: Nathalie Sharma MD  Primary Service: [facility]      ASSESSMENT:     Modified Early Warning Score (MEWS): 4  Abnormal Vital Signs: T 101,   Clinical Issues: Circulatory     INTERVENTIONS/ RECOMMENDATIONS:     Patient seen on proactive rounds for MEWS 4.     On bedside exam, patient resting comfortably in bed. Intermittent fevers receiving tylenol. Cultures NGTD. ID following.   RN at bedside, patient currently afebrile. No acute issues noted at this time.     Discussed plan of care with RAMAKRISHNA Kramer     PHYSICIAN ESCALATION:     Yes/No no    Orders received and case discussed with   n/a     Disposition: ONC    FOLLOW-UP/CONTINGENCY:   No follow up at this time. Will monitor MEWs score. Please notify RRT RN if there is clinical change in patient condition or abnormal vitals. Thank you     Call back the Rapid Response Nurse at x [phone]  for additional questions or concerns

## 2018-01-23 NOTE — PT/OT/SLP PROGRESS
Occupational Therapy  Treatment    Lane Nunez Jr.   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  Pt. Reported that he was a little light headed; nursing approved therapy session   Pain/Comfort  Pain Rating 1: 0/10  Pain Rating Post-Intervention 1: 0/10    Objective:  Patient found with: peripheral IV (supine in bed nursing hanging blood)     Functional Mobility:  Bed Mobility:  CGA supine to sit    Min A sit to supine    Transfers: CGA  For sit to stand x 2 trials with use of bed rail           Functional Ambulation: pt. Performed marching in place x 4 sets 10 reps on this date with CGA/ Min A      Activities of Daily Living:     ADLs not performed on this date                Balance:   Static Sit: FAIR+: Able to take MINIMAL challenges from all directions  Dynamic Sit: FAIR+: Maintains balance through MINIMAL excursions of active trunk motion  Static Stand: FAIR: Maintains without assist but unable to take challenges  Dynamic stand: FAIR: Needs CONTACT GUARD during gait    Therapeutic Activities and Exercises:  Pt. Engaged in 1 set 10 reps of A/AAROM there ex for all shoulder motions while seated EOB   Pt. Performed BUE elbow flexion/extension x 1 set 10 reps  Pt. Performed BLE AROM there ex for all major planes of BLE motion seated EOB x 1 set 10 reps    AM-PAC 6 CLICK ADL   How much help from another person does this patient currently need?   1 = Unable, Total/Dependent Assistance  2 = A lot, Maximum/Moderate Assistance  3 = A little,  "Minimum/Contact Guard/Supervision  4 = None, Modified [place]/Independent    Putting on and taking off regular lower body clothing? : 2  Bathing (including washing, rinsing, drying)?: 2  Toileting, which includes using toilet, bedpan, or urinal? : 2  Putting on and taking off regular upper body clothing?: 2  Taking care of personal grooming such as brushing teeth?: 3  Eating meals?: 3  Total Score: 14     AM-PAC Raw Score CMS "G-Code Modifier Level of Impairment Assistance   6 % Total / Unable   7 - 8 CM 80 - 100% Maximal Assist   9-13 CL 60 - 80% Moderate Assist   14 - 19 CK 40 - 60% Moderate Assist   20 - 22 CJ 20 - 40% Minimal Assist   23 CI 1-20% SBA / CGA   24 CH 0% Independent/ Mod I       Patient left supine with all lines intact, call button in reach and nurse notified    ASSESSMENT:  Lane Nunez Jr. is a 67 y.o. 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  Pt. Would benefit from continued OT services to maximize level of independence and safety with ADL activities.    Rehab identified problem list/impairments: Rehab identified problem list/impairments: impaired self care skills, impaired endurance, impaired functional mobilty    Rehab potential is good.    Activity tolerance: Good    Discharge recommendations: Discharge Facility/Level Of Care Needs: nursing facility, skilled     Barriers to discharge: Barriers to Discharge: None    Equipment recommendations: none     GOALS:    Occupational Therapy Goals        Problem: Occupational Therapy Goal    Goal Priority Disciplines Outcome Interventions   Occupational Therapy Goal     OT, PT/OT Ongoing (interventions implemented as appropriate)    Description:  Goals to be met by: 2/14/18     Patient will increase functional independence with ADLs by performing:    UE Dressing with " Modified [place].  LE Dressing with Modified [place].  Grooming while standing at sink with Modified [place].  Toileting from toilet with Modified [place] for hygiene and clothing management.   Bathing from  edge of bed with Modified [place].  Toilet transfer to toilet with Modified [place].  Increased functional strength to WFL for B UE.  Upper extremity exercise program x15 reps per handout, with independence.                Multidisciplinary Problems (Resolved)        Problem: Occupational Therapy Goal    Goal Priority Disciplines Outcome Interventions   Occupational Therapy Goal   (Resolved)     OT, PT/OT Outcome(s) achieved    Description:  Goals to be met by: 1/17/18     Patient will increase functional independence with ADLs by performing:    UE Dressing with Stand-by Assistance.  LE Dressing with Contact Guard Assistance.  Grooming while seated with Stand-by Assistance.  Toileting from toilet with Contact Guard Assistance for hygiene and clothing management.                       Plan:  Patient to be seen 4 x/week to address the above listed problems via self-care/home management, therapeutic exercises, therapeutic activities  Plan of Care expires:    Plan of Care reviewed with: patient, family    OT G-codes  Functional Assessment Tool Used: [facility]  Score: 14  Self Care Current Status (): YASMIN Schaffer  01/23/2018

## 2018-01-23 NOTE — PT/OT/SLP EVAL
Speech Language Pathology Evaluation  Bedside Swallow    Patient Name:  Lane Nunez Jr.   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    Recommendations:                 General Recommendations:  Follow-up not indicated  Diet recommendations:  Regular, Nectar Thick   Aspiration Precautions: 1 bite/sip at a time, Feed only when awake/alert, HOB to 90 degrees and Small bites/sips   General Precautions: Standard, aspiration, fall, nectar thick  Communication strategies:  none    History:     Past Medical History:   Diagnosis Date    Cancer     CHF (congestive heart failure)     Depression     GERD (gastroesophageal reflux disease)     High cholesterol     Hypertension     Left ventricular ejection fraction less than 40% 8/23/2017    Multiple myeloma     Renal disorder     Stroke     no sequelae       Past Surgical History:   Procedure Laterality Date    BACK SURGERY  1/2000    metal julieth at T-4    CHOLECYSTECTOMY      HEMORRHOID SURGERY      KYPHOSIS SURGERY  2010    PROSTATE SURGERY      SPINE SURGERY      TONSILLECTOMY         Prior diet: Regular/nectar thick liquids.    Subjective     Awake/alert    Objective:     Oral Musculature Evaluation  · Oral Musculature: general weakness  · Dentition: present and adequate  · Mucosal Quality: good  · Oral Labial Strength and Mobility: WFL  · Lingual Strength and Mobility: WFL  · Volitional Cough: weak  · Volitional Swallow: adequate  · Voice Prior to PO Intake: clear    Bedside Swallow Eval:   Consistencies Assessed:  · Nectar thick liquids x7 cup  · Puree x2  · Solids x4    Oral Phase:   · WFL    Pharyngeal Phase:   · no overt clinical signs/symptoms of aspiration   · Timely swallow initiation  · Adequate hyolaryngeal excursion to palpation    Compensatory Strategies  · None        Assessment:     Lane Nunez Jr. is a 67 y.o. male with an SLP diagnosis of Dysphagia. Pt at baseline from  previous admissions    Goals:    SLP Goals        Problem: SLP Goal    Goal Priority Disciplines Outcome   SLP Goal     SLP    Description:  Speech Language Pathology Goals  Goals expected to be met by 1/5:  1. Pt will tolerate regular consistency diet and nectar thick liquids without s/s of aspiration. MET  2. Pt will participate in ongoing swallowing assessment to determine if Modified [place] Swallow Study is warranted to further assess pt's current swallowing function. NOT MET                         Plan:     · Plan of Care reviewed with:  patient, family   · SLP Follow-Up:  No           Time Tracking:     SLP Treatment Date:   01/23/18  Speech Start Time:  1450  Speech Stop Time:  1502     Speech Total Time (min):  12 min    Billable Minutes: Eval Swallow and Oral Function 12    Hallie Sahni CCC-SLP   Speech Language Pathologist  Pager [phone]  01/23/2018

## 2018-01-23 NOTE — PROGRESS NOTES
Road Test  Oxygen- Pt on room air without distress  Ambulation- Pt ambulates with stand by assistance  Devices- Pt going to [facility] in [place] with unaccessed implanted PAC    Tolerating- Pt tolerates a regular diet with thickened liquids  Elimination- Pt voids in the toilet without difficulty  Self care- Pt performs self care with assistance  Teaching- Pt given written and verbal discharge instructions.    Report called to  at [facility] in [place]. Pt tolerates room air, ambulation with assistance, regular diet with thickened liquids, voiding, and self care with assitance. Vital signs stable. IV removed, catheter intact, bleeding controlled, site covered with dry gauze and tape. Reviewed discharge instructions and address all questions. Follow-up appointments, medications, signs and symptoms to notify the MD discussed. Pt states has all belongings.  Pt has no questions or needs at this time. Patient being transported by [facility] van.

## 2018-01-23 NOTE — PROGRESS NOTES
SW received TC from [facility] stating pt has been clinically accepted, spouse will be signing paperwork today. Intake requested chest xray, pasrr and form 142 to be faxed to them at [phone]. SW faxed requested documents. Awaiting return call to determine if pt can DC today.

## 2018-01-23 NOTE — PROGRESS NOTES
[facility]  Infectious Disease  Progress Note    Patient Name: Lane Nunez Jr.  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 and progression over next decade to multiple lytic lesions throughout the spine, ribs, long bones, and compression fracture of C2 s/p PSF in 10/2013; s/p velcade/dex through auto-HSCT 6/25/2014 and subsequent maintenance lenalidomide with VGPR by 2015 and sCR currently) and recently diagonsed ALCL (developed fevers, sweats, weakness, 20lb weight loss, and nodular skin lesions in 10/2017; dx 11/2017 via skin biopsy; started on prednisone 100mg daily on 12/28) who was admitted on 1/12/2018 with FTT, orthostatic hypotension, global weakness/debility, and worsening skin lesions despite steroids for initiation of brentuximab. During the course of his hospitalization, he has developed intermittent fevers not responsive to empiric antibiotics and concurrent with the weaning of his steroids, prompting ID consultation today. Given timing of fever relapse with cessation of steroids and lack of decompensation despite fevers, I suspect these are tumor fevers that were previously masked by steroids. Another possibility would be indolent pulmonary opportunistic infection given new nodules on CT chest after steroid course. He remains stable despite these fevers.    - ski lesions do not appear superinfected and are biopsy proven malignant disease -- low suspicion of concurrent viral infection based on appearance  - follow fungal markers and quantiferon gold, if any is positive please call us back  - new R lung pulmonary nodules, no plans for bronchoscopy noted, low suspicion for invasive fungal infection at this moment  - fever seems  to be related to underlying malignancy  - patient does not have a strong contraindication to withholding brentuximab over infectious concerns at this time            Anticipated Disposition: pending    Thank you for your consult. I will sign off. Please contact us if you have any additional questions.    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 and progression over next decade to multiple lytic lesions throughout the spine, ribs, long bones, and compression fracture of C2 s/p PSF in 10/2013; s/p velcade/dex through auto-HSCT 6/25/2014 and subsequent maintenance lenalidomide with VGPR by 2015 and sCR currently) and recently diagonsed ALCL (developed fevers, sweats, weakness, 20lb weight loss, and nodular skin lesions in 10/2017; dx 11/2017 via skin biopsy; started on prednisone 100mg daily on 12/28) who was admitted on 1/12/2018 with FTT, orthostatic hypotension, global weakness/debility, and worsening skin lesions despite steroids for initiation of  brentuximab. During the course of his hospitalization, he has developed intermittent fevers concurrent with the weaning of his steroids, prompting ID consultation today. Patient notes sweats with the fevers, similar to around when he was first diagnosed with ALCL and prior to steroid initiation. He also reports a cough productive of white sputum and NEWELL that has been present since diagnosis. He denies AMS, HA, neck stiffness, sore throat, URI symptoms, purulent sputum, CP, N/V/D, abdominal pain, dysuria, or superinfection of any of his lesions (although those in a skin fold are tender). He was started on empiric cefepime that was tailored to augmentin prior to ID  consultation today. Workup for fevers has included negative blood/urine cultures and CT CAP that shows known diffuse LAD due to malignancy and scant R lung pulmonary nodules that are new from prior imaging.  Interval History: still spiking fever, but no acute decompensation    Review of Systems   Constitutional: Positive for chills and fever.   HENT: Negative for sore throat.    Respiratory: Negative for cough, chest tightness and shortness of breath.    Cardiovascular: Negative for chest pain, palpitations and leg swelling.   Gastrointestinal: Negative for abdominal distention, abdominal pain, diarrhea, nausea and vomiting.   Genitourinary: Negative for dysuria, flank pain and urgency.   Skin: Positive for rash.   Neurological: Negative for dizziness, light-headedness and numbness.   Psychiatric/Behavioral: Negative for confusion.     Objective:     Vital Signs (Most Recent):  Temp: 99.6 °F (37.6 °C) (01/23/18 1542)  Pulse: (!) 121 (01/23/18 1542)  Resp: 18 (01/23/18 1542)  BP: 138/81 (01/23/18 1542)  SpO2: 99 % (01/23/18 1542) Vital Signs (24h Range):  Temp:  [98 °F (36.7 °C)-102.3 °F (39.1 °C)] 99.6 °F (37.6 °C)  Pulse:  [107-129] 121  Resp:  [16-21] 18  SpO2:  [91 %-99 %] 99 %  BP: (117-147)/(61-81) 138/81     Weight: 97.7 kg (215 lb 6.2 oz)  Body mass index is 28.42 kg/m².    Estimated Creatinine Clearance: 95.3 mL/min (based on SCr of 0.9 mg/dL).    Physical Exam   Constitutional: He is oriented to person, place, and time. No distress.   HENT:   Mouth/Throat: No oropharyngeal exudate.   Eyes: No scleral icterus.   Cardiovascular: Normal rate and regular rhythm.    Pulmonary/Chest: Effort normal and breath sounds normal.   Abdominal: Soft. Bowel sounds are normal. He exhibits no distension. There is no tenderness. There is no rebound.   Musculoskeletal: He exhibits no edema.   Lymphadenopathy:     He has no cervical adenopathy.   Neurological: He is alert and oriented to person, place, and time. No cranial  nerve deficit.   Skin: He is not diaphoretic.   Nodular skin lesions on face, chest, trunk, back   Vitals reviewed.      Significant Labs:   Bilirubin:   Recent Labs  Lab 01/19/18  0530 01/20/18  0414 01/21/18  0649 01/22/18  0411 01/23/18  0527   BILITOT 0.4 0.4 0.5 0.3 0.3     Blood Culture:   Recent Labs  Lab 01/16/18  1820 01/20/18  1709 01/21/18  0904 01/21/18  0911 01/22/18  1813   LABBLOO No growth after 5 days. No Growth to date  No Growth to date  No Growth to date  No Growth to date  No Growth to date  No Growth to date No Growth to date  No Growth to date  No Growth to date No Growth to date  No Growth to date  No Growth to date No Growth to date  No Growth to date     BMP:   Recent Labs  Lab 01/23/18  0527   GLU 84      K 3.6      CO2 27   BUN 10   CREATININE 0.9   CALCIUM 8.7   MG 1.6     CBC:   Recent Labs  Lab 01/22/18  0411 01/23/18  0527   WBC 5.04 3.15*   HGB 7.8* 6.4*   HCT 24.5* 20.1*   PLT 26* 25*     CMP:   Recent Labs  Lab 01/22/18  0411 01/23/18  0527    138   K 3.9 3.6    102   CO2 27 27   GLU 98 84   BUN 11 10   CREATININE 0.9 0.9   CALCIUM 9.1 8.7   PROT 6.6 6.2   ALBUMIN 2.0* 1.9*   BILITOT 0.3 0.3   ALKPHOS 214* 168*   AST 13 14   ALT 14 11   ANIONGAP 10 9   EGFRNONAA >60.0 >60.0     Microbiology Results (last 7 days)     Procedure Component Value Units Date/Time    Cryptococcal antigen [[id number]] Collected:  01/23/18 0528    Order Status:  Completed Specimen:  Blood from Blood Updated:  01/23/18 1424     Cryptococcal Ag, Blood Negative    Respiratory Viral Panel by PCR [facility]; Nasal Swab [[id number]] Collected:  01/21/18 1311    Order Status:  Completed Specimen:  Respiratory Updated:  01/23/18 1254     Respiratory Virus Panel, source Nasal Swab     RVP - Adenovirus Not Detected     Comment: Detects Serotypes B and E. Detection of Serotype C may   be limited. If Adenovirus infection is suspected and a   Not Detected result is returned the sample should  be   re-tested for Adenovirus using an independent method  (e.g. [employer] Adenovirus Quantitative Real-Time  PCR test.          Enterovirus Not Detected     Comment: Cross-reactivity has been observed between certain Rhinovirus  strains and the Enterovirus assay.          Human Bocavirus Not Detected     Human Coronavirus Not Detected     Comment: The Human Coronavirus assay detects Human coronavirus types  229E, OC43,NL63 and HKU1.          RVP - Human Metapneumovirus (hMPV) Not Detected     RVP - Influenza A Not Detected     Influenza A - L4P4-01 Not Detected     RVP - Influenza B Not Detected     Parainfluenza Not Detected     Respiratory Syncytial VirusVirus (RSV) A Not Detected     Comment: The Respiratory Syncytial Viral assay detects types A and B,  however it does not distinguish between the two.          RVP - Rhinovirus Not Detected     Comment: Cross-Reactivity has been observed between certain   Rhinovirus strains and the Enterovirus assay.  Target Enriched Mulitplex Polymerase Chain Reaction (TEM-PCR)  allows for the detection of multiple pathogens out of a single  reaction.  This test was developed and its performance   characteristics determined by [employer].  It has not   been cleared or approved by the U.S.Food and Drug Administration.  Results should be used in conjunction with clinical findings,   and should not form the sole basis for a diagnosis or treatment  decision.  TEM-PCR is a licensed technology of [employer].         Narrative:       Receiving Lab:->[facility]    Blood culture [[id number]] Collected:  01/21/18 0904    Order Status:  Completed Specimen:  Blood Updated:  01/23/18 1212     Blood Culture, Routine No Growth to date     Blood Culture, Routine No Growth to date     Blood Culture, Routine No Growth to date    Blood culture [[id number]] Collected:  01/21/18 0911    Order Status:  Completed Specimen:  Blood Updated:  01/23/18 1212     Blood Culture,  Routine No Growth to date     Blood Culture, Routine No Growth to date     Blood Culture, Routine No Growth to date    Blood culture [[id number]] Collected:  01/22/18 1813    Order Status:  Completed Specimen:  Blood Updated:  01/23/18 0345     Blood Culture, Routine No Growth to date    Blood culture [[id number]] Collected:  01/22/18 1813    Order Status:  Completed Specimen:  Blood Updated:  01/23/18 0315     Blood Culture, Routine No Growth to date    Blood culture [[id number]] Collected:  01/20/18 1709    Order Status:  Completed Specimen:  Blood Updated:  01/22/18 2012     Blood Culture, Routine No Growth to date     Blood Culture, Routine No Growth to date     Blood Culture, Routine No Growth to date    Blood culture [[id number]] Collected:  01/20/18 1709    Order Status:  Completed Specimen:  Blood Updated:  01/22/18 2012     Blood Culture, Routine No Growth to date     Blood Culture, Routine No Growth to date     Blood Culture, Routine No Growth to date    Blood culture [[id number]] Collected:  01/16/18 1820    Order Status:  Completed Specimen:  Blood Updated:  01/21/18 2212     Blood Culture, Routine No growth after 5 days.    Blood culture [[id number]] Collected:  01/16/18 1814    Order Status:  Completed Specimen:  Blood Updated:  01/21/18 2212     Blood Culture, Routine No growth after 5 days.    Urine culture [[id number]] Collected:  01/20/18 1639    Order Status:  Completed Specimen:  Urine from Urine, Clean Catch Updated:  01/21/18 1957     Urine Culture, Routine No growth    Blood culture [[id number]]     Order Status:  Canceled Specimen:  Blood     Blood culture [[id number]]     Order Status:  Canceled Specimen:  Blood     Blood Culture #1 **CANNOT BE ORDERED STAT** [[id number]] Collected:  01/14/18 2012    Order Status:  Completed Specimen:  Blood Updated:  01/19/18 2212     Blood Culture, Routine No growth after 5 days.    Blood culture [[id number]] Collected:  01/14/18 2019    Order Status:  Completed  Specimen:  Blood Updated:  01/19/18 2212     Blood Culture, Routine No growth after 5 days.    Urine culture [[id number]] Collected:  01/16/18 2229    Order Status:  Completed Specimen:  Urine from Urine, Clean Catch Updated:  01/18/18 1247     Urine Culture, Routine No growth    Blood culture [[id number]] Collected:  01/12/18 1908    Order Status:  Completed Specimen:  Blood Updated:  01/17/18 2212     Blood Culture, Routine No growth after 5 days.    Narrative:       Blood cultures x 2 different sites. 4 bottles total. Please  draw cultures before administering antibiotics.  Collection has been rescheduled by SAN1 at 1/12/2018 18:39 Reason:   Patient getting xray   Collection has been rescheduled by SAN1 at 1/12/2018 18:39 Reason:   Patient getting xray     Blood culture [[id number]] Collected:  01/12/18 1857    Order Status:  Completed Specimen:  Blood Updated:  01/17/18 2212     Blood Culture, Routine No growth after 5 days.    Narrative:       Blood cultures from 2 different sites. 4 bottles total.  Please draw before starting antibiotics.  Collection has been rescheduled by SAN1 at 1/12/2018 18:39 Reason:   Patient getting xray   Collection has been rescheduled by SAN1 at 1/12/2018 18:39 Reason:   Patient getting xray           Significant Imaging: I have reviewed all pertinent imaging results/findings within the past 24 hours.

## 2018-01-23 NOTE — PLAN OF CARE
Problem: Occupational Therapy Goal  Goal: Occupational Therapy Goal  Goals to be met by: 2/14/18     Patient will increase functional independence with ADLs by performing:    UE Dressing with Modified [place].  LE Dressing with Modified [place].  Grooming while standing at sink with Modified [place].  Toileting from toilet with Modified [place] for hygiene and clothing management.   Bathing from  edge of bed with Modified [place].  Toilet transfer to toilet with Modified [place].  Increased functional strength to WFL for B UE.  Upper extremity exercise program x15 reps per handout, with independence.     Pt. Tolerated session well

## 2018-01-23 NOTE — PLAN OF CARE
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]  Yes     Priority: 1 - High    Abnormal CT scan of lung [R91.8]  Unknown    Fever [R50.9]  Yes    Glaucoma [H40.9]  Yes    Neuropathy [G62.9]  Yes    Dysphagia [R13.10]  Yes    Multiple myeloma in remission [C90.01]  Yes    Cytopenia [D75.9]  Yes    Cardiomyopathy [I42.9]  Yes    Hypertension [I10]  Yes    History of peripheral stem cell transplant [Z94.84]  Not Applicable    Debility [R53.81]  Yes    BPH (benign prostatic hyperplasia) [N40.0]  Yes    Memory impairment [R41.3]  Yes     Chronic    Depression [F32.9]  Yes      Resolved Hospital Problems    Diagnosis Date Resolved POA    Chest pain [R07.9] 01/20/2018 Unknown     Priority: 4     Constipation [K59.00] 01/20/2018 Unknown    Thrombocytopenia, 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     Can get fluids 100cc/hr x 10 hours if HR above 120   Febrile due to cancer, not due to infection, unless he has new symptoms    Diet:     Diet recommendations:  Regular, Nectar Thick   Aspiration Precautions: 1 bite/sip at a time, Feed only when awake/alert, HOB to 90 degrees  and Small bites/sips   General Precautions: Standard, aspiration, fall, nectar thick   Supplement:  1 can every three times a day with meals                         Type: Boost       Acitivities:     - Up in a chair each morning as tolerated   - Ambulate with assistance to bathroom   - Scheduled walks once each shift (every 8 hours)   - May ambulate with assistance   - May use walker, cane, or self-propelled wheelchair    LABS:    CMP, CBC Monday, Wednesday, Friday    Nursing Precautions:   - Aspiration precautions:             - Total assistance with meals            -  Upright 90 degrees befor during and after meals             -  Suction at bedside          - Fall precautions per nursing home protocol   - Decubitus precautions:        -  for positioning   - Pressure reducing foam mattress   - Turn patient every two hours. Use wedge pillows to anchor patient    CONSULTS:     Physical Therapy to evaluate and treat     Occupational Therapy to evaluate and treat     Speech Therapy  to evaluate and treat     Nutrition to evaluate and recommend diet     Psychiatry to evaluate and follow patients for delirium    MISCELLANEOUS CARE:           Routine Skin for Bedridden Patients:  Apply moisture barrier cream to all    skin folds and wet areas in perineal area daily and after baths and                           all bowel movements.                Medications: Discontinue all previous medication orders, if any. See new list below.     Lane Nunez Jr.   Home Medication Instructions CARLOS:[id number]    Printed on:01/23/18 3623   Medication Information                      acyclovir (ZOVIRAX) 200 MG capsule  Take 2 capsules (400 mg total) by mouth 2 (two) times daily.             albuterol-ipratropium 2.5mg-0.5mg/3mL (DUO-NEB) 0.5 mg-3 mg(2.5 mg base)/3 mL nebulizer solution  Take 3 mLs by nebulization every 4 (four) hours as needed for Wheezing. Rescue             budesonide-formoterol 160-4.5 mcg (SYMBICORT)  160-4.5 mcg/actuation HFAA  Inhale 2 puffs into the lungs every 12 (twelve) hours. Controller             carvedilol (COREG) 3.125 MG tablet  Take 6.25 mg by mouth 2 (two) times daily.             cholecalciferol, vitamin D3, (VITAMIN D3) 2,000 unit Cap  Take 1 capsule by mouth after lunch.              finasteride (PROSCAR) 5 mg tablet  Take 5 mg by mouth after lunch.              gabapentin (NEURONTIN) 300 MG capsule  Take 300 mg by mouth 3 (three) times daily.             galantamine (RAZADYNE) 8 MG Tab  Take 16 mg by mouth 2 (two) times daily with meals.              memantine (NAMENDA) 5 MG Tab  Take 10 mg by mouth 2 (two) times daily.              morphine (MSIR) 15 MG tablet  Take 1 tablet (15 mg total) by mouth every 6 (six) hours as needed for Pain.             MULTIVIT &MINERALS/FERROUS FUM (MULTI VITAMIN ORAL)  Take by mouth.             olanzapine (ZYPREXA) 15 MG Tab  Take 7.5 mg by mouth nightly.             polyethylene glycol (GLYCOLAX) 17 gram PwPk  Take 17 g by mouth 3 (three) times daily as needed.             ranitidine (ZANTAC) 150 MG capsule  Take 150 mg by mouth 2 (two) times daily.             senna-docusate 8.6-50 mg (PERICOLACE) 8.6-50 mg per tablet  Take 1 tablet by mouth once daily.             venlafaxine 150 mg TR24  Take 150 mg by mouth once daily.                 Fevers are due to cancer per infectious disease consult.  He has had fevers without leukocytosis. Tachycardia is baseline.   1/12/18, 1/14/18, 1/16/18, 1/20/18, 1/21/18 Blood cultures all negative/no growth to date  1/15/18 and 1/21/18 respiratory viral panel negative  1/15/18, 1/16/18, 1/20/18 Urine culture negative    Gets tachycardic and has low oral intake.  If HR above 120, give 1L of NS over 10 hours and usually blood pressure responds      _________________________________  Russel Martin MD  01/23/2018

## 2018-01-23 NOTE — PROGRESS NOTES
SW spoke to admissions at [facility] and they are still reviewing clinicals. SW awaiting return call.

## 2018-01-23 NOTE — DISCHARGE SUMMARY
"[facility]  Hematology  Bone Marrow Transplant  Discharge Summary      Patient Name: Lane Nunez Jr.  MRN: [id number]  Admission Date: 1/12/2018  Hospital Length of Stay: 11 days  Discharge Date and Time: No discharge date for patient encounter.  Attending Physician: Nathalie Sharma MD   Discharging Provider: Russel Martin MD  Primary Care Provider: Chuy Oconnell MD    HPI: 66 yo man with MM s/p auto SCT 3 years ago and newly diagnosed ALCL. He presented as a referral with subsequent admission 12/28/2017 for expedited workup of new constitutional sx (weightloss, fatigue, fevers, chills) with associated vesicular lesions spread diffusely throughout his body.  He was started on steroids after a thorough workup including bone marrow biopsy and skin biopsy were done. 12/28/17 skin biopsy resulted : "The differential diagnosis includes lymphomatoid papulosis type C (LyP) vs. primary cutaneous anaplastic large cell lymphoma (C-ALCL) vs. cutaneous involvement by a systemic ALK-1 negative ALCL. Clinical correlation is required for this distinction."    He presented to clinic 1/12/18 for follow up with Dr. Bernabe  Since his discharge, he initially felt well, but he has been declining over the past week. He has been getting weaker around the house, and his wife is again having to do everything for him. He got very weak trying to get out of a wheelchair yesterday, and his wife brought him to the local emergency department.  There he received IV hydration, felt better, and was subsequently discharged.  However again today he describes having orthostatic symptoms (dizziness when standing).  For his leg weakness, it is not limited by pain nor does he have numbness or tingling.  Wife accompanied him today and reports that he spends a lot of time in bed and eats about 2 meals a day at most with little fluid intake.  Denies nausea, vomiting, fevers, chills, myalgias, cough, shortness of breath, abdominal pain, or " "diarrhea.    Previous admission "Oncologic history: 12/28/2017: Pt admitted from bmt clinic after seeing Dr. Idris Bernabe for expedited work up. Follows with Dr. Patricia in [place], [place]. Hx of multiple myeloma with auto SCT 3 years ago. Now with possible concerns for lymphoma (cutaneous?). Denies SOB, chest pain, swelling. With weight loss and fatigue, as well as fevers/chills. With numerous vesicular lesions on trunk, arms, head, above groin (started about 1.5 months ago). Previously had skin bx 11/2017. Reported to show CD30+ lymphoproliferative disorder. Will need bm bx, repeat skin bx, skeletal survey, and to start prednisone 1 mg/kg/day after biopsies are performed. States he has been off revlimid for 3-4 weeks due to thrombocytopenia.  12/29/2017: Skeletal survey without evidence of metastatic disease, skin bx and BM bx both pending. Started on steroids pred 100 mg/day. No issues overnight."    * No surgery found *     Hospital Course: 66 yo man with MM s/p auto SCT 3 years ago and newly diagnosed ALCL. He presented as a referral with subsequent admission 12/28/2017 for expedited workup of new constitutional sx (weightloss, fatigue, fevers, chills) with associated vesicular lesions spread diffusely throughout his body.  He was started on steroids after a thorough workup including bone marrow biopsy and skin biopsy were done. 12/28/17 skin biopsy resulted : "The differential diagnosis includes lymphomatoid papulosis type C (LyP) vs. primary cutaneous anaplastic large cell lymphoma (C-ALCL) vs. cutaneous involvement by a systemic ALK-1 negative ALCL. Clinical correlation is required for this distinction."     He presented to clinic 1/12/18 for follow up with Dr. Bernabe  Since his discharge, he initially felt well, but he has been declining over the past week. He has been getting weaker around the house, and his wife is again having to do everything for him. He got very weak trying to get out of a wheelchair " yesterday, and his wife brought him to the local emergency department.  There he received IV hydration, felt better, and was subsequently discharged.  However again today he describes having orthostatic symptoms (dizziness when standing).  For his leg weakness, it is not limited by pain nor does he have numbness or tingling.  Wife accompanied him today and reports that he spends a lot of time in bed and eats about 2 meals a day at most with little fluid intake.  Denies nausea, vomiting, fevers, chills, myalgias, cough, shortness of breath, abdominal pain, or diarrhea.    He was admitted for failure to thrive and throughout his hospital stay his CBC had been unremarkable without leukocytosis (despite all his fevers) and only needing blood transfusion once on 1/23/18.  His platelets remained consistently in the 20,000 range without any significant bleeding.  His electrolytes and renal function for the most part had been unremarkable as well.    Hospital course was complicated by frequent fevers, for which he received many doses of broad spectrum antibiotics but no cultures have ever turned up positive.  RVP was tested twice and was negative as well.  ID was consulted and was content with the antibiotic coverage that he had gotten, and with negative cultures or imaging to definitively show a source, the recurrent fevers were most likely due to his cancer.  Pulmonology was consulted to review his CT scan if further workup with bronchoscopy was indicated, and they too agreed that his chest CT findings were likely due to his malignancy.    Tachycardia is a constant but benign issue.  He has had troponin series checked twice on separate occassions as he reported exertional right sided chest discomfort while working with physical therapy.  Both times the EKG and troponins were unremarkable.  Likely cause of his chest discomfort is his malpositioned port (lies in brachiocephalic v and has a fibrin sheath as well confirmed by  port study.  In short, his port is unusable.)  Occasionally he gets tachycardic to 140s which is either alleviated by a small bolus of -500ccs or once IVF is run at 100cc/hr x 10 hours, the heart rate improves.  Underlying cause of his tachycardia is likely also malignancy driven, but other causes include medications (ie. Venlafaxine).  However, we were hesitant to adjust his mood and dementia meds as he has been on them for years.      He will be discharged to SNF with our hopes that he returns to clinic to follow up with Dr. Bernabe to get brentuximab, his treatment for ALCL.       Consults         Status Ordering Provider     Inpatient consult to Infectious Diseases  Once     Provider:  (Not yet assigned)    Completed UNA DENIS     Inpatient consult to Midline team  Once     Provider:  (Not yet assigned)    Completed CARLOS BERNABE     Inpatient consult to Pulmonology  Once     Provider:  (Not yet assigned)    Completed UNA DENIS     Inpatient consult to [facility]  Once     Provider:  (Not yet assigned)    Acknowledged UNA DENIS          Significant Diagnostic Studies: Microbiology:   Blood Culture   Lab Results   Component Value Date    LABBLOO No Growth to date 01/22/2018    LABBLOO No Growth to date 01/22/2018    and Urine Culture    Lab Results   Component Value Date    LABURIN No growth 01/20/2018       Pending Diagnostic Studies:     Procedure Component Value Units Date/Time    Aspergillus antigen [[id number]] Collected:  01/23/18 0527    Order Status:  Sent Lab Status:  In process Updated:  01/23/18 0646    Specimen:  Blood from Blood     Fungitell Assay For (1.3)-B-D-Glucans [[id number]] Collected:  01/23/18 0527    Order Status:  Sent Lab Status:  In process Updated:  01/23/18 0646    Specimen:  Blood     Histoplasma antigen, urine [[id number]] Collected:  01/22/18 1627    Order Status:  Sent Lab Status:  In process Updated:  01/22/18 6610    Specimen:  Urine from Urine, Clean Catch      Quantiferon Gold TB [[id number]] Collected:  01/23/18 0527    Order Status:  Sent Lab Status:  In process Updated:  01/23/18 0646    Specimen:  Blood from Blood     Troponin I [[id number]] Collected:  01/19/18 1145    Order Status:  Sent Lab Status:  In process Updated:  01/19/18 1145    Specimen:  Blood from Blood         Final Active Diagnoses:    Diagnosis Date Noted POA    PRINCIPAL PROBLEM:  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] 12/28/2017 Yes    Abnormal CT scan of lung [R91.8] 01/22/2018 Unknown    Fever [R50.9] 01/15/2018 Yes    Glaucoma [H40.9] 12/28/2017 Yes    Neuropathy [G62.9] 12/28/2017 Yes    Dysphagia [R13.10] 12/28/2017 Yes    Multiple myeloma in remission [C90.01] 04/17/2017 Yes    Cytopenia [D75.9] 06/27/2014 Yes    Cardiomyopathy [I42.9] 06/11/2014 Yes    Hypertension [I10] 06/11/2014 Yes    History of peripheral stem cell transplant [Z94.84] 06/06/2014 Not Applicable    Debility [R53.81] 11/25/2013 Yes    BPH (benign prostatic hyperplasia) [N40.0] 11/15/2013 Yes    Memory impairment [R41.3] 11/15/2013 Yes     Chronic    Depression [F32.9] 11/15/2013 Yes      Problems Resolved During this Admission:    Diagnosis Date Noted Date Resolved POA    Chest pain [R07.9] 01/19/2018 01/20/2018 Unknown    Constipation [K59.00] 01/15/2018 01/20/2018 Unknown    Thrombocytopenia, 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    Specialty:  Lab  Why:  Labs- 8:15am  Contact information:  [facility] [address] [phone]  Additional information:  [facility] 3rd Floor           Idris Bernabe MD On 2/1/2018.    Specialty:  Hematology and Oncology  Why:   follow up- 9:00am  Contact information:  [address] [facility] [address]  [phone]                 Patient Instructions:     CBC auto differential   Standing Status: Future  Standing Exp. Date: 03/24/19     Comprehensive metabolic panel   Standing Status: Future  Standing Exp. Date: 03/24/19     Call MD for:  persistent nausea and vomiting or diarrhea     Call MD for:  severe uncontrolled pain     Call MD for:  redness, tenderness, or signs of infection (pain, swelling, redness, odor or green/yellow discharge around incision site)     Call MD for:  difficulty breathing or increased cough     Call MD for:  severe persistent headache     Call MD for:  worsening rash     Call MD for:  persistent dizziness, light-headedness, or visual disturbances     Call MD for:  increased confusion or weakness     Type & Screen   Standing Status: Future  Standing Exp. Date: 03/24/19       Medications:  Reconciled Home Medications:   Current Discharge Medication List      START taking these medications    Details   acyclovir (ZOVIRAX) 200 MG capsule Take 2 capsules (400 mg total) by mouth 2 (two) times daily.  Qty: 120 capsule, Refills: 11    Associated Diagnoses: T-cell lymphoma      albuterol-ipratropium 2.5mg-0.5mg/3mL (DUO-NEB) 0.5 mg-3 mg(2.5 mg base)/3 mL nebulizer solution Take 3 mLs by nebulization every 4 (four) hours as needed for Wheezing. 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  Qty: 30 each, Refills: 1    Associated Diagnoses: Constipation, unspecified constipation type      senna-docusate 8.6-50 mg (PERICOLACE) 8.6-50 mg per tablet Take 1 tablet by mouth once daily.    Associated Diagnoses: Constipation, unspecified constipation type         CONTINUE these medications which have NOT CHANGED    Details    budesonide-formoterol 160-4.5 mcg (SYMBICORT) 160-4.5 mcg/actuation HFAA Inhale 2 puffs into the lungs every 12 (twelve) hours. Controller      cholecalciferol, vitamin D3, (VITAMIN D3) 2,000 unit Cap Take 1 capsule by mouth after lunch.       finasteride (PROSCAR) 5 mg tablet Take 5 mg by mouth after lunch.       gabapentin (NEURONTIN) 300 MG capsule Take 300 mg by mouth 3 (three) times daily.      galantamine (RAZADYNE) 8 MG Tab Take 16 mg by mouth 2 (two) times daily with meals.       memantine (NAMENDA) 5 MG Tab Take 10 mg by mouth 2 (two) times daily.       morphine (MSIR) 15 MG tablet Take 1 tablet (15 mg total) by mouth every 6 (six) hours as needed for Pain.  Qty: 120 tablet, Refills: 0    Associated Diagnoses: Lymphoproliferative disorder; T-cell lymphoma; Multiple myeloma in remission      MULTIVIT &MINERALS/FERROUS FUM (MULTI VITAMIN ORAL) Take by mouth.      olanzapine (ZYPREXA) 15 MG Tab Take 7.5 mg by mouth nightly.      ranitidine (ZANTAC) 150 MG capsule Take 150 mg by mouth 2 (two) times daily.      venlafaxine 150 mg TR24 Take 150 mg by mouth once daily.      carvedilol (COREG) 3.125 MG tablet Take 6.25 mg by mouth 2 (two) times daily.         STOP taking these medications       amitriptyline (ELAVIL) 50 MG tablet Comments:   Reason for Stopping:         aspirin (ECOTRIN) 81 MG EC tablet Comments:   Reason for Stopping:         cyclobenzaprine (FLEXERIL) 5 MG tablet Comments:   Reason for Stopping:         diazePAM (VALIUM) 5 MG tablet Comments:   Reason for Stopping:         furosemide (LASIX) 20 MG tablet Comments:   Reason for Stopping:         lisinopril (PRINIVIL,ZESTRIL) 5 MG tablet Comments:   Reason for Stopping:         potassium chloride (KLOR-CON) 10 MEQ TbSR Comments:   Reason for Stopping:         potassium chloride SA (K-DUR,KLOR-CON) 10 MEQ tablet Comments:   Reason for Stopping:         predniSONE (DELTASONE) 10 MG tablet Comments:   Reason for Stopping:         vitamin E 1000 UNIT  capsule Comments:   Reason for Stopping:         naproxen (EC NAPROSYN) 500 MG EC tablet Comments:   Reason for Stopping:         TRAVATAN Z 0.004 % Drop Comments:   Reason for Stopping:               Russel Martin MD  Bone Marrow Transplant  [facility]

## 2018-01-23 NOTE — PROGRESS NOTES
[facility]  Hematology  Bone Marrow Transplant  Progress Note    Patient Name: Lane Nunez Jr.  Admission Date: 1/12/2018  Hospital Length of Stay: 11 days  Code Status: DNR    Subjective:     Interval History: Febrile again overnight and this morning.  Otherwise denies chest pain, shortness of breath, cough, nausea, vomiting, abdominal pain, or diarrhea.    Objective:     Vital Signs (Most Recent):  Temp: (!) 101 °F (38.3 °C) (01/23/18 0822)  Pulse: (!) 120 (01/23/18 0824)  Resp: 16 (01/23/18 0824)  BP: 130/70 (01/23/18 0739)  SpO2: 95 % (01/23/18 0739) Vital Signs (24h Range):  Temp:  [99.4 °F (37.4 °C)-102.3 °F (39.1 °C)] 101 °F (38.3 °C)  Pulse:  [111-129] 120  Resp:  [16-21] 16  SpO2:  [91 %-95 %] 95 %  BP: (123-141)/(61-78) 130/70     Weight: 97.7 kg (215 lb 6.2 oz)  Body mass index is 28.42 kg/m².  Body surface area is 2.24 meters squared.    ECOG SCORE         [unfilled]    Intake/Output - Last 3 Shifts       01/21 0700 - 01/22 0659 01/22 0700 - 01/23 0659 01/23 0700 - 01/24 0659    P.O. 1200 600     I.V. (mL/kg)  1138.3 (11.7)     IV Piggyback       Total Intake(mL/kg) 1200 (12.3) 1738.3 (17.8)     Urine (mL/kg/hr) 1475 (0.6) 975 (0.4) 300 (1)    Stool 0 (0) 0 (0)     Total Output 1475 975 300    Net -275 +763.3 -300           Urine Occurrence  2 x     Stool Occurrence 1 x 0 x           Physical Exam   Constitutional: He appears well-developed.   HENT:   Head: Normocephalic.   Eyes: EOM are normal. Pupils are equal, round, and reactive to light.   Neck: Normal range of motion. No tracheal deviation present.   Cardiovascular: Regular rhythm and normal heart sounds.  Exam reveals no gallop and no friction rub.    No murmur heard.  Tachycardic  Right chest wall nontender to palpation.  No new overlying skin changes   Pulmonary/Chest: Effort normal and breath sounds normal. No respiratory distress. He has no wheezes. He has no rales.   Abdominal: Soft. Bowel sounds are normal. He exhibits no  distension and no mass. There is no tenderness. There is no guarding.   Musculoskeletal: He exhibits no edema.   Neurological: He is alert.   Slight left sided droop (baseline per wife) and right pupil slightly larger than left.  No other focal deficits.   Skin: Skin is warm and dry.   Multiple areas of erythematous papular and vesicular lesions throughout body  No open bleeding lesions.         Significant Labs:   CBC:   Recent Labs  Lab 01/22/18  0411 01/23/18  0527   WBC 5.04 3.15*   HGB 7.8* 6.4*   HCT 24.5* 20.1*   PLT 26* 25*   , CMP:   Recent Labs  Lab 01/22/18  0411 01/23/18  0527    138   K 3.9 3.6    102   CO2 27 27   GLU 98 84   BUN 11 10   CREATININE 0.9 0.9   CALCIUM 9.1 8.7   PROT 6.6 6.2   ALBUMIN 2.0* 1.9*   BILITOT 0.3 0.3   ALKPHOS 214* 168*   AST 13 14   ALT 14 11   ANIONGAP 10 9   EGFRNONAA >60.0 >60.0    and Coagulation: No results for input(s): PT, INR, APTT in the last 48 hours.    Diagnostic Results:  I have reviewed all pertinent imaging results/findings within the past 24 hours.   Infectious disease Quant gold, B-D glucan, and aspergillus antigen pending.    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 BM Bx negative. Initially planned for treatment with brentuximab.  Stage II ALCL (chest lymphadenopathy only)  -skin bx on 12/28/17- SKIN, RIGHT UPPER BACK, EXCISIONAL BIOPSY: CD30-positive, ALK negative T-cell lymphoproliferative disorder  - Viral workup HIV and hepatitis negative. EBV and CMV negative.  - will give brentuximab as an outpatient        Fever    - patient had febrile episodes over the past 24 hours. Unclear if the fever is related to an infection (?skin as source) or possibly to the lymphoma.  Ddx includes port that has been there for years.  - Started on cefepime 1/20/18, de-escalated to augmentin 1/21/18  - 1/20/18 cultures NGTD. RVP pending  - consulted transplant Id, appreciate  recs  --pending workup with quantiferon gold, b-d glucan, and aspergillus antigen  --per Id's perspective, fevers likely 2/2 malignancy and should not hold him back from chemotherapy.  --consulted pulm with no recommendations for bronch to eval RUL.  -all previous cultures and RVP since 1/12/18 have been negative.        Glaucoma    - no acute issues. Continue home eye drops         Neuropathy    - no acute issues. Continue home gabapentin.        Dysphagia    - possibly secondary to dementia  - He has refused a G-tube on multiple occasions in the past per chart  - During last admission, patient had regular diet with nectar thick liquids and aspiration precautions        Multiple myeloma in remission    - See oncologic hx on H&P for more information  - had silva auto 6/25/14   - was in CR at 1 year  - maintenance Revlimid on hold for 4 weeks due to thrombocytopenia. Will continue to hold at this time  - now with BLE weakness, inability to walk--Will likely need PT/OT after therapy  - recent SPEP with normal protein, FLC from 12/28 with kappa light chain of 4.3, ratio of 1.40   - bone marrow biopsy 12/28/17 : NO MORPHOLOGIC OR IMMUNOPHENOTYPIC EVIDENCE OF RESIDUAL PLASMA CELL NEOPLASM.  -- NO MORPHOLOGIC OR IMMUNOPHENOTYPIC EVIDENCE OF MARROW INVOLVEMENT BY  CD30-POSITIVE T-CELL LYMPHOPROLIFERATIVE DISORDER        Cytopenia    - Anemia and thrombocytopenia likely 2/2 disease  - Revlimid has been on hold due to thrombocytopenia  - hemoglobin is 6.4 g/dL, platelet count is 25 k/uL  - Transfuse for hgb <7 g/dL, plt <10 k/uL   - continue to monitor.        Cardiomyopathy    - hx of decreased EF 30-40%    - no signs of volume overload. Continue to monitor.  - coreg BID per HTN        Hypertension    - blood pressure is with acceptable limits.  - continue carvedilol        History of peripheral stem cell transplant    - had silva auto SCT 6/25/14 for myeloma, day 1306  - 1 year restaging marrow on 7/24/15 showed 40%  cellularity with trilineage hematopoiesis and no morphologic evidence of plasma cells.  Cytogenetics could not be done as the metaphase cells did not grow. SPEP/ELENO 12/28/17 without monoclonal peaks. FLC from 12/28 with kappa light chain of 4.3, ratio of 1.40   - PET CT did not reveal any evidence of myeloma at 1 year   - skeletal survey 12/28/17 was negative  - maintenance lenalidomide on hold         Debility    - continue PT/OT. Awaiting SNF/nursing home placement.        BPH (benign prostatic hyperplasia)    - no acute issues. Continue home finasteride           Memory impairment    - no acute changes.  - continue home memantine and galantamine        Depression    - no acute issues. Continue home amitriptyline, olanzapine, and venlafaxine            VTE Risk Mitigation         Ordered     Medium Risk of VTE  Once      01/12/18 1740     Place sequential compression device  Until discontinued      01/12/18 1740          Disposition: snf    Russel Martin MD  Bone Marrow Transplant  [facility]

## 2018-01-23 NOTE — PLAN OF CARE
MDR's with Dr Sharma.  Patient has been accepted into [facility].  The patient fevers have been ruled out as infection.  OP  brentuximab auth is pending.  Request for chemo appointment and f/u has been sent to the onc clinic.  The patient and his family agree with the d/c plan.  IMM signed.  Transportation and NH needs arranged by .      Future Appointments  Date Time Provider Department Center   2/1/2018 8:15 AM INJECTION, [facility] INFUSION [facility]   2/1/2018 9:00 AM Idris Beranbe MD [facility] QUIROGA [facility]   2/1/2018 9:30 AM [facility], CHEMO [facility]   2/27/2018 12:30 PM [facility] XR1 [facility] XRAY Jacob HWANG   2/27/2018 1:00 PM LAB, [facility] LUNA BIRD [facility] MB LAB [facility]   2/27/2018 1:30 PM Luis Miguel Patricia MD [facility] [place] [facility]   2/27/2018 2:00 PM CHAIR 03 [facility] SHORT [facility] MB SAWYER Karnes G        01/23/18 1542   Final Note   Assessment Type Final Discharge Note   Discharge Disposition Nurse  (for skilled )   What phone number can be called within the next 1-3 days to see how you are doing after discharge? ([phone] wife)   Hospital Follow Up  Appt(s) scheduled? Yes   Discharge plans and expectations educations in teach back method with documentation complete? Yes

## 2018-01-23 NOTE — PLAN OF CARE
Problem: SLP Goal  Goal: SLP Goal  Speech Language Pathology Goals  Goals expected to be met by 1/5:  1. Pt will tolerate regular consistency diet and nectar thick liquids without s/s of aspiration. MET  2. Pt will participate in ongoing swallowing assessment to determine if Modified Barium Swallow Study is warranted to further assess pt's current swallowing function. NOT MET        Pt seen for bedside swallow evaluation. No further ST recommended at this time.   Hallie Sahni CCC-SLP  1/23/2018

## 2018-01-23 NOTE — ASSESSMENT & PLAN NOTE
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 and progression over next decade to multiple lytic lesions throughout the spine, ribs, long bones, and compression fracture of C2 s/p PSF in 10/2013; s/p velcade/dex through auto-HSCT 6/25/2014 and subsequent maintenance lenalidomide with VGPR by 2015 and sCR currently) and recently diagonsed ALCL (developed fevers, sweats, weakness, 20lb weight loss, and nodular skin lesions in 10/2017; dx 11/2017 via skin biopsy; started on prednisone 100mg daily on 12/28) who was admitted on 1/12/2018 with FTT, orthostatic hypotension, global weakness/debility, and worsening skin lesions despite steroids for initiation of brentuximab. During the course of his hospitalization, he has developed intermittent fevers not responsive to empiric antibiotics and concurrent with the weaning of his steroids, prompting ID consultation today. Given timing of fever relapse with cessation of steroids and lack of decompensation despite fevers, I suspect these are tumor fevers that were previously masked by steroids. Another possibility would be indolent pulmonary opportunistic infection given new nodules on CT chest after steroid course. He remains stable despite these fevers.    - ski lesions do not appear superinfected and are biopsy proven malignant disease -- low suspicion of concurrent viral infection based on appearance  - follow fungal markers and quantiferon gold, if any is positive please call us back  - new R lung pulmonary nodules, no plans for bronchoscopy noted, low suspicion for invasive fungal infection at this moment  - fever seems to be related to underlying malignancy  - patient does not have a strong contraindication to withholding brentuximab over infectious concerns at this time

## 2018-01-23 NOTE — ASSESSMENT & PLAN NOTE
- Anemia and thrombocytopenia likely 2/2 disease  - Revlimid has been on hold due to thrombocytopenia  - hemoglobin is 6.4 g/dL, platelet count is 25 k/uL  - Transfuse for hgb <7 g/dL, plt <10 k/uL   - continue to monitor.

## 2018-01-23 NOTE — PROGRESS NOTES
01/22/18 2312 01/23/18 0029   Vital Signs   Temp (!) 102.3 °F (39.1 °C) (!) 102 °F (38.9 °C)   Notified MD regarding temp. Patient had blood cultures drawn this afternoon. Will cont to monitor.

## 2018-01-23 NOTE — HOSPITAL COURSE
"66 yo man with MM s/p auto SCT 3 years ago and newly diagnosed ALCL. He presented as a referral with subsequent admission 12/28/2017 for expedited workup of new constitutional sx (weightloss, fatigue, fevers, chills) with associated vesicular lesions spread diffusely throughout his body.  He was started on steroids after a thorough workup including bone marrow biopsy and skin biopsy were done. 12/28/17 skin biopsy resulted : "The differential diagnosis includes lymphomatoid papulosis type C (LyP) vs. primary cutaneous anaplastic large cell lymphoma (C-ALCL) vs. cutaneous involvement by a systemic ALK-1 negative ALCL. Clinical correlation is required for this distinction."     He presented to clinic 1/12/18 for follow up with Dr. Bernabe  Since his discharge, he initially felt well, but he has been declining over the past week. He has been getting weaker around the house, and his wife is again having to do everything for him. He got very weak trying to get out of a wheelchair yesterday, and his wife brought him to the local emergency department.  There he received IV hydration, felt better, and was subsequently discharged.  However again today he describes having orthostatic symptoms (dizziness when standing).  For his leg weakness, it is not limited by pain nor does he have numbness or tingling.  Wife accompanied him today and reports that he spends a lot of time in bed and eats about 2 meals a day at most with little fluid intake.  Denies nausea, vomiting, fevers, chills, myalgias, cough, shortness of breath, abdominal pain, or diarrhea.    He was admitted for failure to thrive and throughout his hospital stay his CBC had been unremarkable without leukocytosis (despite all his fevers) and only needing blood transfusion once on 1/23/18.  His platelets remained consistently in the 20,000 range without any significant bleeding.  His electrolytes and renal function for the most part had been unremarkable as well.  "   Hospital course was complicated by frequent fevers, for which he received many doses of broad spectrum antibiotics but no cultures have ever turned up positive.  RVP was tested twice and was negative as well.  ID was consulted and was content with the antibiotic coverage that he had gotten, and with negative cultures or imaging to definitively show a source, the recurrent fevers were most likely due to his cancer.  Pulmonology was consulted to review his CT scan if further workup with bronchoscopy was indicated, and they too agreed that his chest CT findings were likely due to his malignancy.    Tachycardia is a constant but benign issue.  He has had troponin series checked twice on separate occassions as he reported exertional right sided chest discomfort while working with physical therapy.  Both times the EKG and troponins were unremarkable.  Likely cause of his chest discomfort is his malpositioned port (lies in brachiocephalic v and has a fibrin sheath as well confirmed by port study.  In short, his port is unusable.)  Occasionally he gets tachycardic to 140s which is either alleviated by a small bolus of -500ccs or once IVF is run at 100cc/hr x 10 hours, the heart rate improves.  Underlying cause of his tachycardia is likely also malignancy driven, but other causes include medications (ie. Venlafaxine).  However, we were hesitant to adjust his mood and dementia meds as he has been on them for years.      He will be discharged to SNF with our hopes that he returns to clinic to follow up with Dr. Bernabe to get brentuximab, his treatment for ALCL.

## 2018-01-23 NOTE — PROGRESS NOTES
YENNY received TC from [facility] confirmed they received fax and that pt can transfer today. YENNY faxed Sn[facility] orders [facility] at [phone]. YENNY scheduled transport through "[employer] phone [phone] for 5pm. Bedside RN provided with RN to RN number [phone]. Family in agreement with transfer. Based on all available information this is a safe and appropriate DC plan.

## 2018-01-23 NOTE — SUBJECTIVE & OBJECTIVE
Subjective:     Interval History: Febrile again overnight and this morning.  Otherwise denies chest pain, shortness of breath, cough, nausea, vomiting, abdominal pain, or diarrhea.    Objective:     Vital Signs (Most Recent):  Temp: (!) 101 °F (38.3 °C) (01/23/18 0822)  Pulse: (!) 120 (01/23/18 0824)  Resp: 16 (01/23/18 0824)  BP: 130/70 (01/23/18 0739)  SpO2: 95 % (01/23/18 0739) Vital Signs (24h Range):  Temp:  [99.4 °F (37.4 °C)-102.3 °F (39.1 °C)] 101 °F (38.3 °C)  Pulse:  [111-129] 120  Resp:  [16-21] 16  SpO2:  [91 %-95 %] 95 %  BP: (123-141)/(61-78) 130/70     Weight: 97.7 kg (215 lb 6.2 oz)  Body mass index is 28.42 kg/m².  Body surface area is 2.24 meters squared.    ECOG SCORE         [unfilled]    Intake/Output - Last 3 Shifts       01/21 0700 - 01/22 0659 01/22 0700 - 01/23 0659 01/23 0700 - 01/24 0659    P.O. 1200 600     I.V. (mL/kg)  1138.3 (11.7)     IV Piggyback       Total Intake(mL/kg) 1200 (12.3) 1738.3 (17.8)     Urine (mL/kg/hr) 1475 (0.6) 975 (0.4) 300 (1)    Stool 0 (0) 0 (0)     Total Output 1475 975 300    Net -275 +763.3 -300           Urine Occurrence  2 x     Stool Occurrence 1 x 0 x           Physical Exam   Constitutional: He appears well-developed.   HENT:   Head: Normocephalic.   Eyes: EOM are normal. Pupils are equal, round, and reactive to light.   Neck: Normal range of motion. No tracheal deviation present.   Cardiovascular: Regular rhythm and normal heart sounds.  Exam reveals no gallop and no friction rub.    No murmur heard.  Tachycardic  Right chest wall nontender to palpation.  No new overlying skin changes   Pulmonary/Chest: Effort normal and breath sounds normal. No respiratory distress. He has no wheezes. He has no rales.   Abdominal: Soft. Bowel sounds are normal. He exhibits no distension and no mass. There is no tenderness. There is no guarding.   Musculoskeletal: He exhibits no edema.   Neurological: He is alert.   Slight left sided droop (baseline per wife) and right  pupil slightly larger than left.  No other focal deficits.   Skin: Skin is warm and dry.   Multiple areas of erythematous papular and vesicular lesions throughout body  No open bleeding lesions.         Significant Labs:   CBC:   Recent Labs  Lab 01/22/18 0411 01/23/18 0527   WBC 5.04 3.15*   HGB 7.8* 6.4*   HCT 24.5* 20.1*   PLT 26* 25*   , CMP:   Recent Labs  Lab 01/22/18 0411 01/23/18 0527    138   K 3.9 3.6    102   CO2 27 27   GLU 98 84   BUN 11 10   CREATININE 0.9 0.9   CALCIUM 9.1 8.7   PROT 6.6 6.2   ALBUMIN 2.0* 1.9*   BILITOT 0.3 0.3   ALKPHOS 214* 168*   AST 13 14   ALT 14 11   ANIONGAP 10 9   EGFRNONAA >60.0 >60.0    and Coagulation: No results for input(s): PT, INR, APTT in the last 48 hours.    Diagnostic Results:  I have reviewed all pertinent imaging results/findings within the past 24 hours.   Infectious disease Quant gold, B-D glucan, and aspergillus antigen pending.

## 2018-01-23 NOTE — ASSESSMENT & PLAN NOTE
- patient had febrile episodes over the past 24 hours. Unclear if the fever is related to an infection (?skin as source) or possibly to the lymphoma.  Ddx includes port that has been there for years.  - Started on cefepime 1/20/18, de-escalated to augmentin 1/21/18  - 1/20/18 cultures NGTD. RVP pending  - consulted transplant Id, appreciate recs  --pending workup with quantiferon gold, b-d glucan, and aspergillus antigen  --per Id's perspective, fevers likely 2/2 malignancy and should not hold him back from chemotherapy.  --consulted pulm with no recommendations for bronch to eval RUL.  -all previous cultures and RVP since 1/12/18 have been negative.

## 2018-01-23 NOTE — ASSESSMENT & PLAN NOTE
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 BM Bx negative. Initially planned for treatment with brentuximab.  Stage II ALCL (chest lymphadenopathy only)  -skin bx on 12/28/17- SKIN, RIGHT UPPER BACK, EXCISIONAL BIOPSY: CD30-positive, ALK negative T-cell lymphoproliferative disorder  - Viral workup HIV and hepatitis negative. EBV and CMV negative.  - will give brentuximab as an outpatient

## 2018-01-23 NOTE — SUBJECTIVE & OBJECTIVE
Interval History: still spiking fever, but no acute decompensation    Review of Systems   Constitutional: Positive for chills and fever.   HENT: Negative for sore throat.    Respiratory: Negative for cough, chest tightness and shortness of breath.    Cardiovascular: Negative for chest pain, palpitations and leg swelling.   Gastrointestinal: Negative for abdominal distention, abdominal pain, diarrhea, nausea and vomiting.   Genitourinary: Negative for dysuria, flank pain and urgency.   Skin: Positive for rash.   Neurological: Negative for dizziness, light-headedness and numbness.   Psychiatric/Behavioral: Negative for confusion.     Objective:     Vital Signs (Most Recent):  Temp: 99.6 °F (37.6 °C) (01/23/18 1542)  Pulse: (!) 121 (01/23/18 1542)  Resp: 18 (01/23/18 1542)  BP: 138/81 (01/23/18 1542)  SpO2: 99 % (01/23/18 1542) Vital Signs (24h Range):  Temp:  [98 °F (36.7 °C)-102.3 °F (39.1 °C)] 99.6 °F (37.6 °C)  Pulse:  [107-129] 121  Resp:  [16-21] 18  SpO2:  [91 %-99 %] 99 %  BP: (117-147)/(61-81) 138/81     Weight: 97.7 kg (215 lb 6.2 oz)  Body mass index is 28.42 kg/m².    Estimated Creatinine Clearance: 95.3 mL/min (based on SCr of 0.9 mg/dL).    Physical Exam   Constitutional: He is oriented to person, place, and time. No distress.   HENT:   Mouth/Throat: No oropharyngeal exudate.   Eyes: No scleral icterus.   Cardiovascular: Normal rate and regular rhythm.    Pulmonary/Chest: Effort normal and breath sounds normal.   Abdominal: Soft. Bowel sounds are normal. He exhibits no distension. There is no tenderness. There is no rebound.   Musculoskeletal: He exhibits no edema.   Lymphadenopathy:     He has no cervical adenopathy.   Neurological: He is alert and oriented to person, place, and time. No cranial nerve deficit.   Skin: He is not diaphoretic.   Nodular skin lesions on face, chest, trunk, back   Vitals reviewed.      Significant Labs:   Bilirubin:   Recent Labs  Lab 01/19/18  0530 01/20/18  0414  01/21/18  0649 01/22/18  0411 01/23/18  0527   BILITOT 0.4 0.4 0.5 0.3 0.3     Blood Culture:   Recent Labs  Lab 01/16/18  1820 01/20/18  1709 01/21/18  0904 01/21/18  0911 01/22/18  1813   LABBLOO No growth after 5 days. No Growth to date  No Growth to date  No Growth to date  No Growth to date  No Growth to date  No Growth to date No Growth to date  No Growth to date  No Growth to date No Growth to date  No Growth to date  No Growth to date No Growth to date  No Growth to date     BMP:   Recent Labs  Lab 01/23/18  0527   GLU 84      K 3.6      CO2 27   BUN 10   CREATININE 0.9   CALCIUM 8.7   MG 1.6     CBC:   Recent Labs  Lab 01/22/18  0411 01/23/18  0527   WBC 5.04 3.15*   HGB 7.8* 6.4*   HCT 24.5* 20.1*   PLT 26* 25*     CMP:   Recent Labs  Lab 01/22/18  0411 01/23/18  0527    138   K 3.9 3.6    102   CO2 27 27   GLU 98 84   BUN 11 10   CREATININE 0.9 0.9   CALCIUM 9.1 8.7   PROT 6.6 6.2   ALBUMIN 2.0* 1.9*   BILITOT 0.3 0.3   ALKPHOS 214* 168*   AST 13 14   ALT 14 11   ANIONGAP 10 9   EGFRNONAA >60.0 >60.0     Microbiology Results (last 7 days)     Procedure Component Value Units Date/Time    Cryptococcal antigen [[id number]] Collected:  01/23/18 0528    Order Status:  Completed Specimen:  Blood from Blood Updated:  01/23/18 1424     Cryptococcal Ag, Blood Negative    Respiratory Viral Panel by PCR [facility]; Nasal Swab [[id number]] Collected:  01/21/18 1311    Order Status:  Completed Specimen:  Respiratory Updated:  01/23/18 1254     Respiratory Virus Panel, source Nasal Swab     RVP - Adenovirus Not Detected     Comment: Detects Serotypes B and E. Detection of Serotype C may   be limited. If Adenovirus infection is suspected and a   Not Detected result is returned the sample should be   re-tested for Adenovirus using an independent method  (e.g. [employer] Adenovirus Quantitative Real-Time  PCR test.          Enterovirus Not Detected     Comment: Cross-reactivity has been  observed between certain Rhinovirus  strains and the Enterovirus assay.          Human Bocavirus Not Detected     Human Coronavirus Not Detected     Comment: The Human Coronavirus assay detects Human coronavirus types  229E, OC43,NL63 and HKU1.          RVP - Human Metapneumovirus (hMPV) Not Detected     RVP - Influenza A Not Detected     Influenza A - R3P2-49 Not Detected     RVP - Influenza B Not Detected     Parainfluenza Not Detected     Respiratory Syncytial VirusVirus (RSV) A Not Detected     Comment: The Respiratory Syncytial Viral assay detects types A and B,  however it does not distinguish between the two.          RVP - Rhinovirus Not Detected     Comment: Cross-Reactivity has been observed between certain   Rhinovirus strains and the Enterovirus assay.  Target Enriched Mulitplex Polymerase Chain Reaction (TEM-PCR)  allows for the detection of multiple pathogens out of a single  reaction.  This test was developed and its performance   characteristics determined by [employer].  It has not   been cleared or approved by the U.S.Food and Drug Administration.  Results should be used in conjunction with clinical findings,   and should not form the sole basis for a diagnosis or treatment  decision.  TEM-PCR is a licensed technology of [employer].         Narrative:       [facility]    Blood culture [[id number]] Collected:  01/21/18 0904    Order Status:  Completed Specimen:  Blood Updated:  01/23/18 1212     Blood Culture, Routine No Growth to date     Blood Culture, Routine No Growth to date     Blood Culture, Routine No Growth to date    Blood culture [[id number]] Collected:  01/21/18 0911    Order Status:  Completed Specimen:  Blood Updated:  01/23/18 1212     Blood Culture, Routine No Growth to date     Blood Culture, Routine No Growth to date     Blood Culture, Routine No Growth to date    Blood culture [[id number]] Collected:  01/22/18 1813    Order Status:  Completed  Specimen:  Blood Updated:  01/23/18 0345     Blood Culture, Routine No Growth to date    Blood culture [[id number]] Collected:  01/22/18 1813    Order Status:  Completed Specimen:  Blood Updated:  01/23/18 0315     Blood Culture, Routine No Growth to date    Blood culture [[id number]] Collected:  01/20/18 1709    Order Status:  Completed Specimen:  Blood Updated:  01/22/18 2012     Blood Culture, Routine No Growth to date     Blood Culture, Routine No Growth to date     Blood Culture, Routine No Growth to date    Blood culture [[id number]] Collected:  01/20/18 1709    Order Status:  Completed Specimen:  Blood Updated:  01/22/18 2012     Blood Culture, Routine No Growth to date     Blood Culture, Routine No Growth to date     Blood Culture, Routine No Growth to date    Blood culture [[id number]] Collected:  01/16/18 1820    Order Status:  Completed Specimen:  Blood Updated:  01/21/18 2212     Blood Culture, Routine No growth after 5 days.    Blood culture [[id number]] Collected:  01/16/18 1814    Order Status:  Completed Specimen:  Blood Updated:  01/21/18 2212     Blood Culture, Routine No growth after 5 days.    Urine culture [[id number]] Collected:  01/20/18 1639    Order Status:  Completed Specimen:  Urine from Urine, Clean Catch Updated:  01/21/18 1957     Urine Culture, Routine No growth    Blood culture [[id number]]     Order Status:  Canceled Specimen:  Blood     Blood culture [[id number]]     Order Status:  Canceled Specimen:  Blood     Blood Culture #1 **CANNOT BE ORDERED STAT** [[id number]] Collected:  01/14/18 2012    Order Status:  Completed Specimen:  Blood Updated:  01/19/18 2212     Blood Culture, Routine No growth after 5 days.    Blood culture [[id number]] Collected:  01/14/18 2019    Order Status:  Completed Specimen:  Blood Updated:  01/19/18 2212     Blood Culture, Routine No growth after 5 days.    Urine culture [[id number]] Collected:  01/16/18 2229    Order Status:  Completed Specimen:  Urine from Urine,  Clean Catch Updated:  01/18/18 1247     Urine Culture, Routine No growth    Blood culture [[id number]] Collected:  01/12/18 1908    Order Status:  Completed Specimen:  Blood Updated:  01/17/18 2212     Blood Culture, Routine No growth after 5 days.    Narrative:       Blood cultures x 2 different sites. 4 bottles total. Please  draw cultures before administering antibiotics.  Collection has been rescheduled by SAN1 at 1/12/2018 18:39 Reason:   Patient getting xray   Collection has been rescheduled by SAN1 at 1/12/2018 18:39 Reason:   Patient getting xray     Blood culture [[id number]] Collected:  01/12/18 1857    Order Status:  Completed Specimen:  Blood Updated:  01/17/18 2212     Blood Culture, Routine No growth after 5 days.    Narrative:       Blood cultures from 2 different sites. 4 bottles total.  Please draw before starting antibiotics.  Collection has been rescheduled by SAN1 at 1/12/2018 18:39 Reason:   Patient getting xray   Collection has been rescheduled by SAN1 at 1/12/2018 18:39 Reason:   Patient getting xray           Significant Imaging: I have reviewed all pertinent imaging results/findings within the past 24 hours.

## 2018-01-23 NOTE — PLAN OF CARE
Problem: Patient Care Overview  Goal: Plan of Care Review  Outcome: Ongoing (interventions implemented as appropriate)  Patient diaphoretic at times. Skin lesions noted with blisters. Patient reports pain to neck and lower back. Repositioned patient several times. Patient with tmax of 102.3. Tylenol given. Light weight covering. Patient tolerating diet with honey thickened liquids. Takes medication with applesauce. Patient with generalized weakness. Skin breakdown to sacral area with shearing. Radiation reddened area to back. Patient reports no other discomfort at this time. Will cont to monitor. Patient on bleeding precautions.

## 2018-01-24 ENCOUNTER — TELEPHONE (OUTPATIENT)
Dept: HEMATOLOGY/ONCOLOGY | Facility: CLINIC | Age: 68
End: 2018-01-24

## 2018-01-24 ENCOUNTER — PATIENT OUTREACH (OUTPATIENT)
Dept: ADMINISTRATIVE | Facility: CLINIC | Age: 68
End: 2018-01-24

## 2018-01-24 LAB
GALACTOMANNAN AG SERPL IA-ACNC: <0.5 INDEX
HISTOPLASMA AG VALUE: 0 NG/ML
HISTOPLASMA ANTIGEN URINE: NEGATIVE

## 2018-01-24 NOTE — PROGRESS NOTES
Patient's nurse Nia contacted Oncology Social Worker to check on the status of patient's transport, as patient was supposed to be picked up at 5 PM. Called [employer] and spoke with Dex, who checked and informed me that the  is en route, close by and should be arriving any minute. Called patient's nurse Nia and let her know. No other needs indicated at this time.

## 2018-01-24 NOTE — TELEPHONE ENCOUNTER
Spoke to patient's wife and she stated they wanted to have his treatments in [place].  After confirming with Dr Bernabe, called Mrs Flores's back and confirmed with her that treatment can be obtained in [place].  Instructed her to call Dr. Patricia's office to get scheduled.  Verbalized understanding.              ----- Message from dIris Bernabe MD sent at 1/24/2018  8:38 AM CST -----  Contact: Valery   Can we try to bring Mr. Nunez in this week (tomorrow or Friday) to see me and get brentuximab? Please let me know if this can be arranged. Thanks.   ----- Message -----  From: Valery Dyson RN  Sent: 1/23/2018   6:36 PM  To: Idris Bernabe MD    It looks like the brentuximab is authorized.  He discharged to [facility] today.  If you would like to arrange for him to be seen this week let Kourtney know.  She will probably be able to help arrange transport from the nursing home.      Thanks

## 2018-01-25 LAB
1,3 BETA GLUCAN SPEC-MCNC: <31 PG/ML
BACTERIA BLD CULT: NORMAL
BACTERIA BLD CULT: NORMAL
BLASTOMYCES AG INTERP: NEGATIVE
BLASTOMYCES AG RESULT: NORMAL NG/ML
BLASTOMYCES AG SPECIMEN: NORMAL
MITOGEN NIL: 9.77 IU/ML
NIL: 0.17 IU/ML
TB ANTIGEN NIL: -0.02 IU/ML
TB ANTIGEN: 0.15 IU/ML
TB GOLD: NEGATIVE

## 2018-01-25 NOTE — PT/OT/SLP DISCHARGE
Occupational Therapy Discharge Summary    Lane Nunez Jr.  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  Please refer to prior OT note dated 01-23-18 for functional status.    Assessment:      Patient appropriate for care in another setting.    Objective:     GOALS:    Occupational Therapy Goals        Problem: Occupational Therapy Goal    Goal Priority Disciplines Outcome Interventions   Occupational Therapy Goal     OT, PT/OT Ongoing (interventions implemented as appropriate)    Description:  Goals to be met by: 2/14/18     Patient will increase functional independence with ADLs by performing:    UE Dressing with Modified [place].  LE Dressing with Modified [place].  Grooming while standing at sink with Modified [place].  Toileting from toilet with Modified [place] for hygiene and clothing management.   Bathing from  edge of bed with Modified [place].  Toilet transfer to toilet with Modified [place].  Increased functional strength to WFL for B UE.  Upper extremity exercise program x15 reps per handout, with independence.                Multidisciplinary Problems (Resolved)        Problem: Occupational Therapy Goal    Goal Priority Disciplines Outcome Interventions   Occupational Therapy Goal   (Resolved)     OT, PT/OT Outcome(s) achieved    Description:  Goals to be met by: 1/17/18     Patient will increase functional independence with ADLs by performing:    UE Dressing with Stand-by Assistance.  LE Dressing with Contact Guard Assistance.  Grooming while seated with Stand-by Assistance.MET  Toileting from toilet with Contact Guard Assistance for hygiene and clothing management.                       Reasons for Discontinuation of Therapy Services  Transfer to alternate level of care.      Plan:     Patient Discharged to: Nursing home    Diana PINEDA  YASMIN Shaikh  1/25/2018

## 2018-01-26 LAB
BACTERIA BLD CULT: NORMAL
BACTERIA BLD CULT: NORMAL

## 2018-01-26 NOTE — PT/OT/SLP DISCHARGE
Physical Therapy Discharge Summary    Name: Lane Nunez Jr.  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  Please refer to prior PT noted date on 2018 for functional status.     Assessment:     Patient was discharged unexpectedly.  Information required to complete an accurate discharge summary is unknown.  Refer to therapy initial evaluation and last progress note for initial and most recent functional status and goal achievement.  Recommendations made may be found in medical record.    Objective:     GOALS:    Physical Therapy Goals        Problem: Physical Therapy Goal    Goal Priority Disciplines Outcome Goal Variances Interventions   Physical Therapy Goal     PT/OT, PT Ongoing (interventions implemented as appropriate)     Description:  Goals to be met by: 18     Patient will increase functional independence with mobility by performin. Supine to sit with Stand-by Assistance - met   2. Sit to stand transfer with Minimal Assistance - met   2a. Sit to stand transfer with Supervision   3. Bed to chair transfer with Minimal Assistance using appropriate AD or without AD. - met  4. Gait  x 3 feet with Minimal Assistance using appropriate AD or without AD. - met  4a. Gait x50 ft with SBA using appropriate AD or without AD  5. Wheelchair propulsion x20 feet with Stand-by Assistance using BUE and/or BLE.  6. Lower extremity exercise program x15 reps, with assistance as needed, in order to increase LE strength and (I) with functional mobility.                   Multidisciplinary Problems (Resolved)        Problem: Physical Therapy Goal    Goal Priority Disciplines Outcome Goal Variances Interventions   Physical Therapy Goal   (Resolved)     PT/OT, PT Outcome(s) achieved     Description:  Goals to be met by: 18    Patient will increase functional independence with  mobility by performin. Supine to sit with Stand-by Assistance  2. Sit to supine with Stand-by Assistance  3. Sit to stand transfer with Supervision  4. Bed to chair transfer with Supervision using Rolling Walker  5. Gait  x 150 feet with Supervision using Rolling Walker.   6. Ascend/descend 5 stair with left Handrails Contact Guard Assistance using Rolling Walker.                       Reasons for Discontinuation of Therapy Services  Transfer to alternate level of care.      Plan:     Patient Discharged to: Skilled Nursing Facility.    Vu Méndez, PT  2018

## 2018-01-27 LAB
BACTERIA BLD CULT: NORMAL
BACTERIA BLD CULT: NORMAL

## 2018-02-02 PROBLEM — Z71.9 ENCOUNTER FOR EDUCATION: Status: ACTIVE | Noted: 2018-02-02

## 2018-02-15 PROBLEM — R65.20 SEVERE SEPSIS: Status: ACTIVE | Noted: 2018-02-15

## 2018-02-15 PROBLEM — I95.9 HYPOTENSION: Status: ACTIVE | Noted: 2018-02-15

## 2018-02-15 PROBLEM — A41.9 SEVERE SEPSIS: Status: ACTIVE | Noted: 2018-02-15

## 2018-02-15 PROBLEM — J44.1 COPD EXACERBATION: Status: ACTIVE | Noted: 2018-02-15

## 2018-02-16 PROBLEM — R63.4 UNINTENDED WEIGHT LOSS: Status: ACTIVE | Noted: 2018-02-16

## 2018-02-17 PROBLEM — D69.6 THROMBOCYTOPENIA: Status: ACTIVE | Noted: 2018-02-17

## 2018-02-18 PROBLEM — I51.89 DIASTOLIC DYSFUNCTION: Status: ACTIVE | Noted: 2018-02-18

## 2018-02-19 PROBLEM — C85.90 LYMPHOMA: Status: ACTIVE | Noted: 2018-02-19

## 2018-02-19 PROBLEM — D63.0 ANEMIA IN NEOPLASTIC DISEASE: Status: ACTIVE | Noted: 2018-02-19

## 2018-02-19 PROBLEM — T45.1X5A ANEMIA DUE TO ANTINEOPLASTIC CHEMOTHERAPY: Status: ACTIVE | Noted: 2018-02-19

## 2018-02-19 PROBLEM — D64.81 ANEMIA DUE TO ANTINEOPLASTIC CHEMOTHERAPY: Status: ACTIVE | Noted: 2018-02-19

## 2018-02-21 PROBLEM — R29.898 WEAKNESS OF BOTH LOWER EXTREMITIES: Status: ACTIVE | Noted: 2018-02-21

## 2018-02-21 PROBLEM — G72.89 PARANEOPLASTIC MYOPATHY: Status: ACTIVE | Noted: 2018-02-21

## 2018-02-27 PROBLEM — D61.810 PANCYTOPENIA DUE TO CHEMOTHERAPY: Status: ACTIVE | Noted: 2018-02-27

## 2018-03-20 PROBLEM — R93.1 LEFT VENTRICULAR EJECTION FRACTION LESS THAN 40%: Status: RESOLVED | Noted: 2017-08-23 | Resolved: 2018-03-20

## 2018-06-26 DIAGNOSIS — C90.01 MULTIPLE MYELOMA IN REMISSION: Primary | ICD-10-CM

## 2018-07-03 PROBLEM — N63.0 BREAST MASS IN MALE: Status: ACTIVE | Noted: 2018-07-03

## 2018-07-03 PROBLEM — N62 GYNECOMASTIA, MALE: Status: ACTIVE | Noted: 2018-07-03

## 2018-07-18 ENCOUNTER — LAB VISIT (OUTPATIENT)
Dept: LAB | Facility: HOSPITAL | Age: 68
End: 2018-07-18
Payer: MEDICARE

## 2018-07-18 ENCOUNTER — OFFICE VISIT (OUTPATIENT)
Dept: HEMATOLOGY/ONCOLOGY | Facility: CLINIC | Age: 68
End: 2018-07-18
Payer: MEDICARE

## 2018-07-18 VITALS
HEIGHT: 73 IN | WEIGHT: 201.94 LBS | DIASTOLIC BLOOD PRESSURE: 75 MMHG | HEART RATE: 89 BPM | SYSTOLIC BLOOD PRESSURE: 134 MMHG | TEMPERATURE: 98 F | OXYGEN SATURATION: 96 % | BODY MASS INDEX: 26.76 KG/M2

## 2018-07-18 DIAGNOSIS — C90.01 MULTIPLE MYELOMA IN REMISSION: ICD-10-CM

## 2018-07-18 DIAGNOSIS — D64.81 ANEMIA DUE TO ANTINEOPLASTIC CHEMOTHERAPY: ICD-10-CM

## 2018-07-18 DIAGNOSIS — C84.78 ANAPLASTIC ALK-NEGATIVE LARGE CELL LYMPHOMA OF LYMPH NODES OF MULTIPLE REGIONS: ICD-10-CM

## 2018-07-18 DIAGNOSIS — T45.1X5A ANEMIA DUE TO ANTINEOPLASTIC CHEMOTHERAPY: ICD-10-CM

## 2018-07-18 DIAGNOSIS — D69.6 THROMBOCYTOPENIA: ICD-10-CM

## 2018-07-18 DIAGNOSIS — Z94.84 HISTORY OF AUTOLOGOUS STEM CELL TRANSPLANT: Primary | ICD-10-CM

## 2018-07-18 LAB
ALBUMIN SERPL BCP-MCNC: 3.6 G/DL
ALP SERPL-CCNC: 145 U/L
ALT SERPL W/O P-5'-P-CCNC: 16 U/L
ANION GAP SERPL CALC-SCNC: 9 MMOL/L
AST SERPL-CCNC: 23 U/L
BASOPHILS # BLD AUTO: 0.05 K/UL
BASOPHILS NFR BLD: 0.6 %
BILIRUB SERPL-MCNC: 0.3 MG/DL
BUN SERPL-MCNC: 17 MG/DL
CALCIUM SERPL-MCNC: 9.8 MG/DL
CHLORIDE SERPL-SCNC: 105 MMOL/L
CO2 SERPL-SCNC: 28 MMOL/L
CREAT SERPL-MCNC: 1.1 MG/DL
DIFFERENTIAL METHOD: ABNORMAL
EOSINOPHIL # BLD AUTO: 0.1 K/UL
EOSINOPHIL NFR BLD: 0.7 %
ERYTHROCYTE [DISTWIDTH] IN BLOOD BY AUTOMATED COUNT: 15.1 %
EST. GFR  (AFRICAN AMERICAN): >60 ML/MIN/1.73 M^2
EST. GFR  (NON AFRICAN AMERICAN): >60 ML/MIN/1.73 M^2
GLUCOSE SERPL-MCNC: 95 MG/DL
HCT VFR BLD AUTO: 34.7 %
HGB BLD-MCNC: 11.3 G/DL
IGA SERPL-MCNC: 387 MG/DL
IGG SERPL-MCNC: 1429 MG/DL
IGM SERPL-MCNC: 65 MG/DL
IMM GRANULOCYTES # BLD AUTO: 0.05 K/UL
IMM GRANULOCYTES NFR BLD AUTO: 0.6 %
LDH SERPL L TO P-CCNC: 242 U/L
LYMPHOCYTES # BLD AUTO: 3.3 K/UL
LYMPHOCYTES NFR BLD: 35.9 %
MCH RBC QN AUTO: 35.3 PG
MCHC RBC AUTO-ENTMCNC: 32.6 G/DL
MCV RBC AUTO: 108 FL
MONOCYTES # BLD AUTO: 1.6 K/UL
MONOCYTES NFR BLD: 17.2 %
NEUTROPHILS # BLD AUTO: 4.1 K/UL
NEUTROPHILS NFR BLD: 45 %
NRBC BLD-RTO: 0 /100 WBC
PLATELET # BLD AUTO: 80 K/UL
PMV BLD AUTO: 11.8 FL
POTASSIUM SERPL-SCNC: 4.3 MMOL/L
PROT SERPL-MCNC: 7.7 G/DL
RBC # BLD AUTO: 3.2 M/UL
SODIUM SERPL-SCNC: 142 MMOL/L
WBC # BLD AUTO: 9.05 K/UL

## 2018-07-18 PROCEDURE — 99215 OFFICE O/P EST HI 40 MIN: CPT | Mod: S$PBB,,, | Performed by: INTERNAL MEDICINE

## 2018-07-18 PROCEDURE — 80053 COMPREHEN METABOLIC PANEL: CPT

## 2018-07-18 PROCEDURE — 82784 ASSAY IGA/IGD/IGG/IGM EACH: CPT | Mod: 59

## 2018-07-18 PROCEDURE — 84165 PROTEIN E-PHORESIS SERUM: CPT

## 2018-07-18 PROCEDURE — 86334 IMMUNOFIX E-PHORESIS SERUM: CPT

## 2018-07-18 PROCEDURE — 83615 LACTATE (LD) (LDH) ENZYME: CPT

## 2018-07-18 PROCEDURE — 84165 PROTEIN E-PHORESIS SERUM: CPT | Mod: 26,,, | Performed by: PATHOLOGY

## 2018-07-18 PROCEDURE — 83520 IMMUNOASSAY QUANT NOS NONAB: CPT

## 2018-07-18 PROCEDURE — 36415 COLL VENOUS BLD VENIPUNCTURE: CPT

## 2018-07-18 PROCEDURE — 99213 OFFICE O/P EST LOW 20 MIN: CPT | Mod: PBBFAC | Performed by: INTERNAL MEDICINE

## 2018-07-18 PROCEDURE — 85025 COMPLETE CBC W/AUTO DIFF WBC: CPT

## 2018-07-18 PROCEDURE — 86334 IMMUNOFIX E-PHORESIS SERUM: CPT | Mod: 26,,, | Performed by: PATHOLOGY

## 2018-07-18 PROCEDURE — 99999 PR PBB SHADOW E&M-EST. PATIENT-LVL III: CPT | Mod: PBBFAC,,, | Performed by: INTERNAL MEDICINE

## 2018-07-19 LAB
ALBUMIN SERPL ELPH-MCNC: 3.84 G/DL
ALPHA1 GLOB SERPL ELPH-MCNC: 0.35 G/DL
ALPHA2 GLOB SERPL ELPH-MCNC: 0.75 G/DL
B-GLOBULIN SERPL ELPH-MCNC: 1.03 G/DL
GAMMA GLOB SERPL ELPH-MCNC: 1.44 G/DL
INTERPRETATION SERPL IFE-IMP: NORMAL
KAPPA LC SER QL IA: 3.37 MG/DL
KAPPA LC/LAMBDA SER IA: 1.67
LAMBDA LC SER QL IA: 2.02 MG/DL
PROT SERPL-MCNC: 7.4 G/DL

## 2018-07-20 LAB
PATHOLOGIST INTERPRETATION IFE: NORMAL
PATHOLOGIST INTERPRETATION SPE: NORMAL

## 2018-07-23 PROBLEM — D63.0 ANEMIA IN NEOPLASTIC DISEASE: Status: RESOLVED | Noted: 2018-02-19 | Resolved: 2018-07-23

## 2018-07-23 PROBLEM — D61.810 PANCYTOPENIA DUE TO CHEMOTHERAPY: Status: RESOLVED | Noted: 2018-02-27 | Resolved: 2018-07-23

## 2018-07-23 NOTE — PROGRESS NOTES
HEMATOLOGIC MALIGNANCIES PROGRESS NOTE    IDENTIFYING STATEMENT   Lane Nunez Jr. (Lane) is a 68 y.o. male with a  of 1950 from [place] with the diagnosis of multiple myeloma and CD30+ T-cell lymphoma.      ONCOLOGY HISTORY:    1. [place] light chain multiple myeloma s/p autologous stem cell transplant   A.  - Isolated plasmacytoma of the T-spine   B. 2010: Developed low back pain. MRI showed L5 compression fracture and marrow signal. BMBx at that time showed no significant plasma cell population.   C. 2011: Metastatic skeletal survey showed a lytic lesion in the L humerus, lucent changes in the calvarium, and possible lytic lesion at the R inferior pubic ramus.   D. 2013: MRI showed multiple lytic lesions throughout the spine, ribs, bones, with significant expansion of th C2 vertebral body wtih some soft tissue anterior and posterior with some cord effacement. Small nodes were seen in the L supraclavicular region, largest 2 x 1.8 cm.    E. 2013: MRI C,T-spine showed multifocal osseous lesions throughout with dsic bulging at C3-4 and C4-5.    F. 9/3/2013: Kappa light chains 2577 (units?), lambda 11.83, ratio 217. SPEP shows no M-protein.   G. 2013: BMBx - 4% monoclonal plasma cell population. FISH negative.    H. 2013: Initial consult with Dr. Nickolas FLORES   10/24/2013: Biopsy of lytic bone lesion (spine) - sheets of plasma cells consistent with plasma cell neoplasm.    J. 11/15/2013: Presented to [facility] with cervical compression fracture. Cervical fusion surgery performed.    K. 13: Began bortezomib + dexamethasone therapy.    L. 2014: Restaging at completion of therapy (pre-transplant); BMBx - 50-60% cellular marrow with no evidence of plasma cell dyscrasia. SPEP and ELENO normal. Kappa light chains 1.39 mg/dl, lambda 0.79, ratio 1.76.    M. 2014: autologous stem cell transplant with melphalan conditioning. Subsequently started on maintenance lenalidomide.   N. 2015:  "1-year restaging; BMBx - 5-6% plasma cells; SPEP measures no M-protein. ELENO shows faint irregularity involving IgG lambda (not kappa), kappa light chains 2.37 mg/dl, lambda 1.07, ratio 2.21. Findings are consistent with very good partial response by IMWG criteria.    O. 6/27/2016: Kappa light chains 3.7 mg/dl, lambda 1.51, ratio 2.45. PET/CT shows multiple lytic bone lesions involving axial and appendicular skeleton with no corresponding FDG hypometabolism, compatible with treated multiple myeloma.    P. 12/27/2016: Kappa light chains 5.1 mg/dl, lambda 1.73, ratio 2.95   Q. 7/6/2017: Kappa 3.46 mg/dl, lambda 2.29, ratio 1.51   R. 11/21/2017: Kappa 44.87, lambda 29.36, ratio 1.53    S. 12/28/17: Kappa 4.3 mg/dl, lambda 3.08 mg/dl, ratio 1.4; BMBx shows no evidence of plasma cell neoplasm; CT chest, abdomen, pelvis shows multiple lytic lesions. Apparent sCR.    T. 7/18/2018: M-protein negative; Kappa 3.37 mg/dl, lambda 2.02 mg/dl; ratio 1.67 - serologic relapse but remains in VGPR    2. 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 10/2017: Began feeling weak. Noticed developing skin lesions   B. 11/8/2017: Skin biopsy - CD30+ lymphoproliferative disorder. 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 12/28/17: CT chest, abdomen, pelvis shows multiple enlarged mediastinal and right hilar lymph nodes... Mildly prominent bilateral axillary lymph nodes. 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    D. 2/6/2018: Begin brentuximab 1.5 mg/kg every three weeks   E. 02/14/18 CT a/p(-)C: No acute findings;The lower abdominal wall nodules seen on the 1/17/2018 exam are no " longer demonstrated;  Multiple osseous lucencies, compatible with history of multiple myeloma    F. 03/14/18 PET/CT: Diffuse osteolytic lesions, as noted on prior exams. No abnormal foci of hypermetabolic activity.   G. 05/14/18 CT c/a/p(+)C: Diffuse osteolytic lesions, stable; Otherwise, unremarkable examination of the c/a/p.    3. History of cervical spine fracture and fusion surgery related to multiple myeloma above    INTERVAL HISTORY:      Mr. Nunez returns to clinic for four year follow-up of his autologous stem cell transplant. Since he was last seen, he has been pursuing brentuximab therapy for his CD30+ ALCL. This has resulted in a complete remission with minimal symptoms. He has notable lower extremity weakness with neuropathy, but he otherwise has been doing much better. He is eating. He denies any specific bone pain today.     There is a singular lesion on his left forearm that has come up in the past week. It is nodular and small, but it has an appearance that resembles his prior lymphomatous lesions that have all resolved. He is anxious about it.     Past Medical History, Past Social History and Past Family History have been reviewed and are unchanged except as noted in the interval history.    MEDICATIONS:     Current Outpatient Prescriptions on File Prior to Visit   Medication Sig Dispense Refill    acyclovir (ZOVIRAX) 200 MG capsule Take 200 mg by mouth every 4 (four) hours while awake.      albuterol-ipratropium 2.5mg-0.5mg/3mL (DUO-NEB) 0.5 mg-3 mg(2.5 mg base)/3 mL nebulizer solution Take 3 mLs by nebulization every 4 (four) hours as needed for Wheezing. Rescue 1 Box 0    allopurinol (ZYLOPRIM) 100 MG tablet Take 100 mg by mouth once daily.      aspirin 81 MG Chew Take 81 mg by mouth once daily.      budesonide-formoterol 160-4.5 mcg (SYMBICORT) 160-4.5 mcg/actuation HFAA Inhale 2 puffs into the lungs every 12 (twelve) hours. Controller      BYSTOLIC 10 mg Tab Take 10 mg by mouth once  daily.  5    cholecalciferol, vitamin D3, (VITAMIN D3) 2,000 unit Cap Take 1 capsule by mouth after lunch.       finasteride (PROSCAR) 5 mg tablet Take 5 mg by mouth after lunch.       furosemide (LASIX) 20 MG tablet Take 20 mg by mouth 2 (two) times daily.      gabapentin (NEURONTIN) 300 MG capsule Take 300 mg by mouth 3 (three) times daily.      galantamine (RAZADYNE) 8 MG Tab Take 16 mg by mouth 2 (two) times daily with meals.       memantine (NAMENDA) 5 MG Tab Take 10 mg by mouth 2 (two) times daily.       morphine (MSIR) 15 MG tablet Take 1 tablet (15 mg total) by mouth every 6 (six) hours as needed for Pain. 120 tablet 0    MULTIVIT &MINERALS/FERROUS FUM (MULTI VITAMIN ORAL) Take by mouth.      olanzapine (ZYPREXA) 15 MG Tab Take 7.5 mg by mouth nightly.      polyethylene glycol (GLYCOLAX) 17 gram PwPk Take 17 g by mouth 3 (three) times daily as needed. 30 each 1    potassium chloride (KLOR-CON) 10 MEQ TbSR Take 10 mEq by mouth once.      ranitidine (ZANTAC) 150 MG capsule Take 150 mg by mouth 2 (two) times daily.      senna-docusate 8.6-50 mg (PERICOLACE) 8.6-50 mg per tablet Take 1 tablet by mouth once daily.      venlafaxine (EFFEXOR-XR) 150 MG Cp24 Take 75 mg by mouth once daily.       No current facility-administered medications on file prior to visit.        ALLERGIES:   Review of patient's allergies indicates:   Allergen Reactions    Keflex [cephalexin] Rash        ROS:       Review of Systems   Constitutional: Positive for fatigue. Negative for diaphoresis, fever and unexpected weight change.   HENT:   Negative for lump/mass and sore throat.    Eyes: Negative for icterus.   Respiratory: Negative for cough and shortness of breath.    Cardiovascular: Negative for chest pain and palpitations.   Gastrointestinal: Negative for abdominal distention, constipation, diarrhea, nausea and vomiting.   Genitourinary: Negative for dysuria and frequency.    Musculoskeletal: Positive for gait problem.  "Negative for arthralgias and myalgias.   Skin: Negative for rash.        Singular lesion on the left forearm   Neurological: Positive for extremity weakness and gait problem. Negative for dizziness and headaches.   Hematological: Negative for adenopathy. Does not bruise/bleed easily.   Psychiatric/Behavioral: The patient is not nervous/anxious.        PHYSICAL EXAM:  Vitals:    07/18/18 1426   BP: 134/75   Pulse: 89   Temp: 98.4 °F (36.9 °C)   TempSrc: Oral   SpO2: 96%   Weight: 91.6 kg (201 lb 15.1 oz)   Height: 6' 1" (1.854 m)   PainSc:   7   PainLoc: Generalized       Physical Exam   Constitutional: He is oriented to person, place, and time. He appears well-developed and well-nourished.   HENT:   Head: Normocephalic and atraumatic.   Mouth/Throat: Oropharynx is clear and moist and mucous membranes are normal. No oral lesions.   Eyes: Conjunctivae are normal.   Neck: No thyromegaly present.   Cardiovascular: Normal rate, regular rhythm and normal heart sounds.    No murmur heard.  Pulmonary/Chest: Breath sounds normal. He has no wheezes. He has no rales.   Abdominal: Soft. He exhibits no distension and no mass. There is no splenomegaly or hepatomegaly. There is no tenderness.   Lymphadenopathy:     He has no cervical adenopathy.        Right cervical: No deep cervical adenopathy present.       Left cervical: No deep cervical adenopathy present.     He has no axillary adenopathy.        Right: No inguinal adenopathy present.        Left: No inguinal adenopathy present.   Neurological: He is alert and oriented to person, place, and time. He has normal reflexes. No cranial nerve deficit. Coordination normal.   Skin: No rash noted.   The skin is normal compared with my prior exam except for one small subcentimeter nodule on the left forearm. It is round. Nontender.      LAB:   Results for orders placed or performed in visit on 07/18/18   Rapid BMT CBC with Diff   Result Value Ref Range    WBC 9.05 3.90 - 12.70 K/uL    " RBC 3.20 (L) 4.60 - 6.20 M/uL    Hemoglobin 11.3 (L) 14.0 - 18.0 g/dL    Hematocrit 34.7 (L) 40.0 - 54.0 %     (H) 82 - 98 fL    MCH 35.3 (H) 27.0 - 31.0 pg    MCHC 32.6 32.0 - 36.0 g/dL    RDW 15.1 (H) 11.5 - 14.5 %    Platelets 80 (L) 150 - 350 K/uL    MPV 11.8 9.2 - 12.9 fL    Immature Granulocytes 0.6 (H) 0.0 - 0.5 %    Gran # (ANC) 4.1 1.8 - 7.7 K/uL    Immature Grans (Abs) 0.05 (H) 0.00 - 0.04 K/uL    Lymph # 3.3 1.0 - 4.8 K/uL    Mono # 1.6 (H) 0.3 - 1.0 K/uL    Eos # 0.1 0.0 - 0.5 K/uL    Baso # 0.05 0.00 - 0.20 K/uL    nRBC 0 0 /100 WBC    Gran% 45.0 38.0 - 73.0 %    Lymph% 35.9 18.0 - 48.0 %    Mono% 17.2 (H) 4.0 - 15.0 %    Eosinophil% 0.7 0.0 - 8.0 %    Basophil% 0.6 0.0 - 1.9 %    Differential Method Automated    Comprehensive metabolic panel   Result Value Ref Range    Sodium 142 136 - 145 mmol/L    Potassium 4.3 3.5 - 5.1 mmol/L    Chloride 105 95 - 110 mmol/L    CO2 28 23 - 29 mmol/L    Glucose 95 70 - 110 mg/dL    BUN, Bld 17 8 - 23 mg/dL    Creatinine 1.1 0.5 - 1.4 mg/dL    Calcium 9.8 8.7 - 10.5 mg/dL    Total Protein 7.7 6.0 - 8.4 g/dL    Albumin 3.6 3.5 - 5.2 g/dL    Total Bilirubin 0.3 0.1 - 1.0 mg/dL    Alkaline Phosphatase 145 (H) 55 - 135 U/L    AST 23 10 - 40 U/L    ALT 16 10 - 44 U/L    Anion Gap 9 8 - 16 mmol/L    eGFR if African American >60.0 >60 mL/min/1.73 m^2    eGFR if non African American >60.0 >60 mL/min/1.73 m^2   Protein electrophoresis, serum   Result Value Ref Range    Protein, Serum 7.4 6.0 - 8.4 g/dL    Albumin grams/dl 3.84 3.35 - 5.55 g/dL    Alpha-1 grams/dl 0.35 0.17 - 0.41 g/dL    Alpha-2 grams/dl 0.75 0.43 - 0.99 g/dL    Beta grams/dl 1.03 0.50 - 1.10 g/dL    Gamma grams/dl 1.44 0.67 - 1.58 g/dL   Immunofixation electrophoresis   Result Value Ref Range    Immunofix Interp. SEE COMMENT    Immunoglobulin free LT chains blood   Result Value Ref Range    Kappa Free Light Chains 3.37 (H) 0.33 - 1.94 mg/dL    Lambda Free Light Chains 2.02 0.57 - 2.63 mg/dL     Kappa/Lambda FLC Ratio 1.67 (H) 0.26 - 1.65   Immunoglobulins (IgG, IgA, IgM) Quantitative   Result Value Ref Range    IgG - Serum 1429 650 - 1600 mg/dL    IgA 387 (H) 40 - 350 mg/dL    IgM 65 50 - 300 mg/dL   Lactate dehydrogenase   Result Value Ref Range     110 - 260 U/L   Pathologist Interpretation ELENO   Result Value Ref Range    Pathologist Interpretation ELENO REVIEWED    Pathologist Interpretation SPE   Result Value Ref Range    Pathologist Interpretation SPE REVIEWED        PROBLEMS ASSESSED THIS VISIT:    1. History of autologous stem cell transplant    2. 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 Multiple myeloma in remission    4. Thrombocytopenia    5. Anemia due to antineoplastic chemotherapy        PLAN:       History of autologous stem cell transplant  Mr. uNnez returns for his 4-year post transplant visit today. His multiple myeloma is currently in a very good partial remission (VGPR) as there is some increase in the kappa free light chains, but he does not yet meet criteria for progressive disease. We will need to continue to monitor this over time. Currently, his larger issue is related to the anaplastic large cell lymphoma which has developed and for which he is getting treatment with brentuximab.     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 I will refer to dermatology for biopsy. If he is having relapsed disease on brentuximab, then we may consider salvage chemotherapy; however, this may be a difficult thing for him to tolerate at this time.     Anemia due to antineoplastic chemotherapy  He has anemia and thrombocytopenia related to ongoing chemotherapy. This is stable. Dr. Patricia will monitor.     Follow-up   - He will follow locally with Dr. Patricia  - Monitor serum free light chains every three  months  - Continue brentuximab for ALCL  - Derm referral  - Follow-up here if either disease progresses or for 5-year post-transplant follow-up    Idris Bernabe MD  Hematology and Stem Cell Transplant

## 2018-07-23 NOTE — ASSESSMENT & PLAN NOTE
This appears to be in remission; however, he has a new skin lesion that is markedly similar to his prior lesions. I will refer to dermatology for biopsy. If he is having relapsed disease on brentuximab, then we may consider salvage chemotherapy; however, this may be a difficult thing for him to tolerate at this time.

## 2018-07-23 NOTE — ASSESSMENT & PLAN NOTE
He has anemia and thrombocytopenia related to ongoing chemotherapy. This is stable. Dr. Patricia will monitor.

## 2018-07-24 PROBLEM — C85.90 T-CELL LYMPHOMA: Status: ACTIVE | Noted: 2018-07-24

## 2018-07-24 PROBLEM — D47.9 LYMPHOPROLIFERATIVE DISORDER: Status: ACTIVE | Noted: 2018-07-24

## 2018-07-24 PROBLEM — R60.0 PERIPHERAL EDEMA: Status: ACTIVE | Noted: 2018-07-24

## 2018-08-10 ENCOUNTER — TELEPHONE (OUTPATIENT)
Dept: HEMATOLOGY/ONCOLOGY | Facility: CLINIC | Age: 68
End: 2018-08-10

## 2018-08-10 NOTE — TELEPHONE ENCOUNTER
Spoke to wife.  Inquiring about dermatology appt.  Informed her I would send a message to dermatology dept to contact her to schedule an appt. Verbalized understanding        ----- Message from Ijeoma Berman sent at 8/10/2018 10:34 AM CDT -----  Contact: pt wife  I received this message & in the chart I pt needs to return in 1 year.  I know we are not open to schedule out that far but Renetta says the wife called.  Did the pt need an appt earlier than the 1 yr fu that is in the chart.       Renetta send these type of messages to the staff box please.  ----- Message -----  From: Renetta Crow  Sent: 8/10/2018   9:59 AM  To: Ijeoma Berman    Pt wife called to speak with you to see if pt will have a appt   Callback#[phone]  Thank You  [facility]

## 2018-08-31 PROBLEM — J93.9 PNEUMOTHORAX ON RIGHT: Status: ACTIVE | Noted: 2018-08-31

## 2018-09-02 PROBLEM — R63.8 ALTERATION IN NUTRITION: Status: ACTIVE | Noted: 2018-09-02

## 2018-09-06 PROBLEM — Z99.11 ON MECHANICALLY ASSISTED VENTILATION: Status: ACTIVE | Noted: 2018-09-06

## 2018-09-06 PROBLEM — J44.9 COPD (CHRONIC OBSTRUCTIVE PULMONARY DISEASE): Status: ACTIVE | Noted: 2018-02-15

## 2018-09-06 PROBLEM — J93.9 PNEUMOTHORAX: Status: ACTIVE | Noted: 2018-09-06

## 2018-09-08 PROBLEM — I82.409 DVT (DEEP VENOUS THROMBOSIS): Status: ACTIVE | Noted: 2018-09-08

## 2018-09-09 PROBLEM — Z99.11 ON MECHANICALLY ASSISTED VENTILATION: Status: RESOLVED | Noted: 2018-09-06 | Resolved: 2018-09-09

## 2021-09-17 NOTE — ASSESSMENT & PLAN NOTE
- no acute issues. Continue home gabapentin.   Spoke with patient. [facility], [facility] ([facility]) and they will fax new form to me to be completed.

## 2022-04-17 NOTE — ASSESSMENT & PLAN NOTE
See oncologic hx on H&P for more information  - had silva auto 6/25/14   - was in CR at 1 year  - maintenance Revlimid on hold for 4 weeks due to thrombocytopenia. Will continue to hold at this time  - now with BLE weakness, inability to walk--Will likely need PT/OT after therapy  - recent SPEP with normal protein, FLC from 12/28 with kappa light chain of 4.3, ratio of 1.40   - bone marrow biopsy 12/28/17 : NO MORPHOLOGIC OR IMMUNOPHENOTYPIC EVIDENCE OF RESIDUAL PLASMA CELL NEOPLASM.  -- NO MORPHOLOGIC OR IMMUNOPHENOTYPIC EVIDENCE OF MARROW INVOLVEMENT BY  CD30-POSITIVE T-CELL LYMPHOPROLIFERATIVE DISORDER   0